# Patient Record
Sex: FEMALE | Race: ASIAN | NOT HISPANIC OR LATINO | Employment: FULL TIME | URBAN - METROPOLITAN AREA
[De-identification: names, ages, dates, MRNs, and addresses within clinical notes are randomized per-mention and may not be internally consistent; named-entity substitution may affect disease eponyms.]

---

## 2017-09-15 ENCOUNTER — OFFICE VISIT (OUTPATIENT)
Dept: MEDICAL GROUP | Facility: MEDICAL CENTER | Age: 27
End: 2017-09-15
Payer: COMMERCIAL

## 2017-09-15 VITALS
HEART RATE: 102 BPM | HEIGHT: 67 IN | SYSTOLIC BLOOD PRESSURE: 116 MMHG | RESPIRATION RATE: 16 BRPM | DIASTOLIC BLOOD PRESSURE: 70 MMHG | TEMPERATURE: 99.2 F | WEIGHT: 139.4 LBS | OXYGEN SATURATION: 97 % | BODY MASS INDEX: 21.88 KG/M2

## 2017-09-15 DIAGNOSIS — Z76.89 ENCOUNTER TO ESTABLISH CARE: ICD-10-CM

## 2017-09-15 DIAGNOSIS — E55.9 VITAMIN D DEFICIENCY: ICD-10-CM

## 2017-09-15 DIAGNOSIS — E06.3 HASHIMOTO'S THYROIDITIS: ICD-10-CM

## 2017-09-15 PROCEDURE — 99214 OFFICE O/P EST MOD 30 MIN: CPT | Performed by: PHYSICIAN ASSISTANT

## 2017-09-15 RX ORDER — LIOTHYRONINE SODIUM 5 UG/1
5 TABLET ORAL DAILY
Qty: 90 TAB | Refills: 1 | Status: SHIPPED | OUTPATIENT
Start: 2017-09-15 | End: 2018-03-29 | Stop reason: SDUPTHER

## 2017-09-15 RX ORDER — LIOTHYRONINE SODIUM 5 UG/1
5 TABLET ORAL DAILY
COMMUNITY
End: 2017-09-15 | Stop reason: SDUPTHER

## 2017-09-15 RX ORDER — LEVOTHYROXINE SODIUM 88 UG/1
CAPSULE ORAL
COMMUNITY
End: 2017-09-15 | Stop reason: SDUPTHER

## 2017-09-15 RX ORDER — LEVOTHYROXINE SODIUM 88 UG/1
1 CAPSULE ORAL EVERY MORNING
Qty: 90 CAP | Refills: 1 | Status: SHIPPED | OUTPATIENT
Start: 2017-09-15 | End: 2018-03-30 | Stop reason: SDUPTHER

## 2017-09-15 RX ORDER — LEVONORGESTREL AND ETHINYL ESTRADIOL 100-20(84)
KIT ORAL
COMMUNITY
End: 2018-01-18 | Stop reason: SDUPTHER

## 2017-09-15 ASSESSMENT — PATIENT HEALTH QUESTIONNAIRE - PHQ9: CLINICAL INTERPRETATION OF PHQ2 SCORE: 0

## 2017-09-15 NOTE — ASSESSMENT & PLAN NOTE
She also has been diagnosed with vitamin D deficiency. Has not been taking any over-the-counter supplements.

## 2017-09-15 NOTE — LETTER
[employer] [facility]  Dm Irvin P.A.-C.  [address]  [place] [address]  Fax: [phone]   Authorization for Release/Disclosure of   Protected Health Information   Name: STEPHEN LANGE : 1990 SSN: [id number]   Address:  [address]  [place] [address] Phone:    [phone] (home)    I authorize the entity listed below to release/disclose the PHI below to:   [employer] [facility]/Dm Irvin P.A.-C. and Dm Irvin P.A.-C.   Provider or Entity Name:  [facility], [place], [place]   Phone:      Fax:     Reason for request: continuity of care   Information to be released:    [  ] LAST COLONOSCOPY,  including any PATH REPORT and follow-up  [  ] LAST FIT/COLOGUARD RESULT [  ] LAST DEXA  [  ] LAST MAMMOGRAM  [  ] LAST PAP  [  ] LAST LABS [  ] RETINA EXAM REPORT  [  ] IMMUNIZATION RECORDS  [ xx ] Release all info      [  ] Check here and initial the line next to each item to release ALL health information INCLUDING  _____ Care and treatment for drug and / or alcohol abuse  _____ HIV testing, infection status, or AIDS  _____ Genetic Testing    DATES OF SERVICE OR TIME PERIOD TO BE DISCLOSED: _____________  I understand and acknowledge that:  * This Authorization may be revoked at any time by you in writing, except if your health information has already been used or disclosed.  * Your health information that will be used or disclosed as a result of you signing this authorization could be re-disclosed by the recipient. If this occurs, your re-disclosed health information may no longer be protected by State or Federal laws.  * You may refuse to sign this Authorization. Your refusal will not affect your ability to obtain treatment.  * This Authorization becomes effective upon signing and will  on (date) __________.      If no date is indicated, this Authorization will  one (1) year from the signature date.    Name: Stephen Lange    Signature:   Date:     9/15/2017       PLEASE FAX REQUESTED RECORDS BACK TO: [phone]

## 2017-09-15 NOTE — PROGRESS NOTES
"Subjective:   Osito Lange is a 27 y.o. female here today for establishing care and to discuss her chronic history of Hashimoto's thyroiditis.    Hashimoto's thyroiditis  This is a 27-year-old female was here today to establish care. She's been diagnosed with Hashimoto's thyroiditis. Her thyroid labs have been stable over the course of couple years. She is currently on Cytomel 5 mg daily. Also takes L-thyroxine 88 µg daily. [place]. Has not established with one in [place]. Has been or 5 years. Her symptoms are pretty stable.    Vitamin D deficiency  She also has been diagnosed with vitamin D deficiency. Has not been taking any over-the-counter supplements.       Current medicines (including changes today)  Current Outpatient Prescriptions   Medication Sig Dispense Refill   • Levonorgest-Eth Estrad 91-Day 0.1-0.02 & 0.01 MG Tab Take  by mouth.     • liothyronine (CYTOMEL) 5 MCG Tab Take 1 Tab by mouth every day. 90 Tab 1   • Levothyroxine Sodium (TIROSINT) 88 MCG Cap Take 1 Cap by mouth every morning. 90 Cap 1     No current facility-administered medications for this visit.      She  has a past medical history of Thyroid disease.    ROS   No chest pain, no shortness of breath, no abdominal pain and all other systems were reviewed and are negative.       Objective:     Blood pressure 116/70, pulse (!) 102, temperature 37.3 °C (99.2 °F), resp. rate 16, height 1.689 m (5' 6.5\"), weight 63.2 kg (139 lb 6.4 oz), last menstrual period 09/05/2017, SpO2 97 %. Body mass index is 22.16 kg/m².   Physical Exam:  Constitutional: Alert, no distress.  Skin: Warm, dry, good turgor, no rashes in visible areas.  Eye: Equal, round and reactive, conjunctiva clear, lids normal.  ENMT: Lips without lesions, good dentition, oropharynx clear.  Neck: Trachea midline, no masses.   Lymph: No cervical or supraclavicular lymphadenopathy  Respiratory: Unlabored respiratory effort, lungs clear to " auscultation, no wheezes, no ronchi.  Cardiovascular: Normal S1, S2, no murmur, no edema.  Abdomen: Soft, non-tender, no masses.  Psych: Alert and oriented x3, normal affect and mood.        Assessment and Plan:   The following treatment plan was discussed    1. Hashimoto's thyroiditis  Chronic condition. Currently she is tachycardic. Ordered labs to check her thyroid. Refer to endocrinology to establish care. Prescribe Cytomel and levothyroxine as she's been taking in the past. She will be contacted with the results.  - REFERRAL TO ENDOCRINOLOGY  - liothyronine (CYTOMEL) 5 MCG Tab; Take 1 Tab by mouth every day.  Dispense: 90 Tab; Refill: 1  - Levothyroxine Sodium (TIROSINT) 88 MCG Cap; Take 1 Cap by mouth every morning.  Dispense: 90 Cap; Refill: 1  - THYROID PANEL WITH TSH    2. Vitamin D deficiency  Chronic condition. Ordered vitamin D level.  - VITAMIN D,25 HYDROXY; Future    3. Encounter to establish care      Followup: Return if symptoms worsen or fail to improve.    Please note that this dictation was created using voice recognition software. I have made every reasonable attempt to correct obvious errors, but I expect that there are errors of grammar and possibly content that I did not discover before finalizing the note.

## 2017-09-15 NOTE — ASSESSMENT & PLAN NOTE
This is a 27-year-old female was here today to establish care. She's been diagnosed with Hashimoto's thyroiditis. Her thyroid labs have been stable over the course of couple years. She is currently on Cytomel 5 mg daily. Also takes L-thyroxine 88 µg daily. She was [place] by an [place]ocr[place]. Has not established with one in [place]. Has been or 5 years. Her symptoms are pretty stable.

## 2017-10-05 ENCOUNTER — TELEPHONE (OUTPATIENT)
Dept: MEDICAL GROUP | Facility: MEDICAL CENTER | Age: 27
End: 2017-10-05

## 2017-10-05 NOTE — TELEPHONE ENCOUNTER
VOICEMAIL  1. Caller Name: Taz at Endocrinology                      Call Back Number: [phone]    2. Message: Taz left a voicemail on 10/4/17 stating that before they can schedule patient's appointment he needs the prior endocrinology information and for her to do the latest TSH lab.    3. Patient approves office to leave a detailed voicemail/MyChart message: no

## 2017-11-15 ENCOUNTER — TELEPHONE (OUTPATIENT)
Dept: ENDOCRINOLOGY | Facility: MEDICAL CENTER | Age: 27
End: 2017-11-15

## 2017-11-15 NOTE — TELEPHONE ENCOUNTER
Called in to Dm Irvin's office to check in on the status of the patient's referral. I was told that I would get a call back regarding its status. The patient needs to have thyroid labs completed, and we need records from the previous endocrinologist in order for her to be scheduled.

## 2017-11-17 ENCOUNTER — TELEPHONE (OUTPATIENT)
Dept: MEDICAL GROUP | Facility: MEDICAL CENTER | Age: 27
End: 2017-11-17

## 2017-11-17 NOTE — TELEPHONE ENCOUNTER
----- Message from Dena Alex sent at 11/15/2017  1:46 PM PST -----      ----- Message -----  From: Samantha Russ, Med Ass't  Sent: 11/15/2017   1:38 PM  To: Dena Alex    Please look into this, looks like Perla opened and closed the encounter but NEVER sent it to her provider.   ----- Message -----  From: Tracy Stevens  Sent: 11/15/2017   1:21 PM  To: [facility] Anny Mcghee from endo called asking for patient thyroid labs and records from previous endocrinologist. There is an encounter on 10/05 no one has responded . Please advise. Thank you

## 2017-11-17 NOTE — TELEPHONE ENCOUNTER
Phone Number Called: [phone] (home)      Message: Left message for patient to call us back to see where she had her lab work done at.     Left Message for patient to call back: yes

## 2017-12-15 ENCOUNTER — HOSPITAL ENCOUNTER (OUTPATIENT)
Dept: LAB | Facility: MEDICAL CENTER | Age: 27
End: 2017-12-15
Attending: PHYSICIAN ASSISTANT
Payer: COMMERCIAL

## 2017-12-15 DIAGNOSIS — E55.9 VITAMIN D DEFICIENCY: ICD-10-CM

## 2017-12-15 LAB
25(OH)D3 SERPL-MCNC: 30 NG/ML (ref 30–100)
T4 FREE SERPL-MCNC: 1.01 NG/DL (ref 0.53–1.43)
TSH SERPL DL<=0.005 MIU/L-ACNC: 0.74 UIU/ML (ref 0.38–5.33)

## 2017-12-15 PROCEDURE — 84443 ASSAY THYROID STIM HORMONE: CPT

## 2017-12-15 PROCEDURE — 36415 COLL VENOUS BLD VENIPUNCTURE: CPT

## 2017-12-15 PROCEDURE — 84439 ASSAY OF FREE THYROXINE: CPT

## 2017-12-15 PROCEDURE — 82306 VITAMIN D 25 HYDROXY: CPT

## 2017-12-18 ENCOUNTER — TELEPHONE (OUTPATIENT)
Dept: MEDICAL GROUP | Facility: MEDICAL CENTER | Age: 27
End: 2017-12-18

## 2017-12-18 NOTE — TELEPHONE ENCOUNTER
Phone Number Called: [phone] (home)       Message: Patient informed of msg.    Left Message for patient to call back: yes

## 2017-12-18 NOTE — TELEPHONE ENCOUNTER
----- Message from Dm Irvin P.A.-C. sent at 12/18/2017  8:11 AM PST -----  Please contact Osito.  Thyroid labs were in normal range.  Vitamin D was at the low end of normal.  May take 1000 units of OTC vitamin D daily.  Thank you.    Dm

## 2018-01-18 RX ORDER — LEVONORGESTREL AND ETHINYL ESTRADIOL 100-20(84)
1 KIT ORAL DAILY
Qty: 91 TAB | Refills: 1 | Status: SHIPPED | OUTPATIENT
Start: 2018-01-18 | End: 2018-07-23 | Stop reason: SDUPTHER

## 2018-01-18 NOTE — TELEPHONE ENCOUNTER
Was the patient seen in the last year in this department? Yes     Does patient have an active prescription for medications requested? No     Received Request Via: Patient     Pt called and LM stating she is out of her BC pills.

## 2018-03-29 DIAGNOSIS — E06.3 HASHIMOTO'S THYROIDITIS: ICD-10-CM

## 2018-03-29 RX ORDER — LIOTHYRONINE SODIUM 5 UG/1
TABLET ORAL
Qty: 90 TAB | Refills: 1 | Status: SHIPPED | OUTPATIENT
Start: 2018-03-29 | End: 2018-09-30 | Stop reason: SDUPTHER

## 2018-03-30 DIAGNOSIS — E06.3 HASHIMOTO'S THYROIDITIS: ICD-10-CM

## 2018-03-30 RX ORDER — LEVOTHYROXINE SODIUM 88 UG/1
1 CAPSULE ORAL EVERY MORNING
Qty: 90 CAP | Refills: 1 | Status: SHIPPED | OUTPATIENT
Start: 2018-03-30 | End: 2018-11-02 | Stop reason: SDUPTHER

## 2018-11-02 ENCOUNTER — OFFICE VISIT (OUTPATIENT)
Dept: MEDICAL GROUP | Facility: MEDICAL CENTER | Age: 28
End: 2018-11-02
Payer: COMMERCIAL

## 2018-11-02 VITALS
WEIGHT: 149.25 LBS | TEMPERATURE: 98.8 F | DIASTOLIC BLOOD PRESSURE: 68 MMHG | RESPIRATION RATE: 16 BRPM | SYSTOLIC BLOOD PRESSURE: 110 MMHG | OXYGEN SATURATION: 97 % | HEIGHT: 67 IN | HEART RATE: 94 BPM | BODY MASS INDEX: 23.43 KG/M2

## 2018-11-02 DIAGNOSIS — Z30.8 ENCOUNTER FOR OTHER CONTRACEPTIVE MANAGEMENT: ICD-10-CM

## 2018-11-02 DIAGNOSIS — E06.3 HASHIMOTO'S THYROIDITIS: ICD-10-CM

## 2018-11-02 DIAGNOSIS — Z00.00 ANNUAL PHYSICAL EXAM: ICD-10-CM

## 2018-11-02 PROCEDURE — 99385 PREV VISIT NEW AGE 18-39: CPT | Performed by: PHYSICIAN ASSISTANT

## 2018-11-02 RX ORDER — LIOTHYRONINE SODIUM 5 UG/1
5 TABLET ORAL
Qty: 90 TAB | Refills: 3 | Status: SHIPPED | OUTPATIENT
Start: 2018-11-02 | End: 2019-11-25 | Stop reason: SDUPTHER

## 2018-11-02 RX ORDER — LEVONORGESTREL AND ETHINYL ESTRADIOL 100-20(84)
1 KIT ORAL
Qty: 91 TAB | Refills: 3 | Status: SHIPPED | OUTPATIENT
Start: 2018-11-02 | End: 2019-11-07 | Stop reason: SDUPTHER

## 2018-11-02 RX ORDER — LEVOTHYROXINE SODIUM 88 UG/1
1 CAPSULE ORAL EVERY MORNING
Qty: 90 CAP | Refills: 3 | Status: SHIPPED | OUTPATIENT
Start: 2018-11-02 | End: 2019-03-26

## 2018-11-02 ASSESSMENT — PATIENT HEALTH QUESTIONNAIRE - PHQ9: CLINICAL INTERPRETATION OF PHQ2 SCORE: 0

## 2018-11-02 NOTE — ASSESSMENT & PLAN NOTE
This is a 28-year-old female who is requesting annual physical today.  No new complaints today.  Taking medication for her thyroid condition.  Has not yet seen endocrinology.  Also taking birth control medication.  Would like to have labs done today.  She now has a fiancé.

## 2018-11-02 NOTE — PROGRESS NOTES
"Subjective:   Osito Lange is a 28 y.o. female here today for annual physical.    Annual physical exam  This is a 28-year-old female who is requesting annual physical today.  No new complaints today.  Taking medication for her thyroid condition.  Has not yet seen endocrinology.  Also taking birth control medication.  Would like to have labs done today.  She now has a fiancé.      Current medicines (including changes today)  Current Outpatient Prescriptions   Medication Sig Dispense Refill   • Levonorgest-Eth Estrad 91-Day 0.1-0.02 & 0.01 MG Tab Take 1 Tab by mouth every day. 91 Tab 3   • liothyronine (CYTOMEL) 5 MCG Tab Take 1 Tab by mouth every day. 90 Tab 3   • Levothyroxine Sodium (TIROSINT) 88 MCG Cap Take 1 Cap by mouth every morning. 90 Cap 3     No current facility-administered medications for this visit.      She  has a past medical history of Thyroid disease.    Social History and Family History were reviewed and updated.    ROS   No chest pain, no shortness of breath, no abdominal pain and all other systems were reviewed and are negative.       Objective:     Blood pressure 110/68, pulse 94, temperature 37.1 °C (98.8 °F), temperature source Temporal, resp. rate 16, height 1.689 m (5' 6.5\"), weight 67.7 kg (149 lb 4 oz), last menstrual period 08/02/2018, SpO2 97 %, not currently breastfeeding. Body mass index is 23.73 kg/m².   Physical Exam:  Constitutional: Alert, no distress.  Skin: Warm, dry, good turgor, no rashes in visible areas.  Eye: Equal, round and reactive, conjunctiva clear, lids normal.  ENMT: Lips without lesions, good dentition, oropharynx clear.  Neck: Trachea midline, no masses.   Lymph: No cervical or supraclavicular lymphadenopathy  Respiratory: Unlabored respiratory effort, lungs clear to auscultation, no wheezes, no ronchi.  Cardiovascular: Normal S1, S2, no murmur, no edema.  Abdomen: Soft, non-tender, no masses.  Psych: Alert and oriented x3, normal affect and " mood.        Assessment and Plan:   The following treatment plan was discussed    1. Annual physical exam  Ordered labs fasting and she will be contacted.  Provided refills of medication.  Referral made.  - LIPID PROFILE; Future  - COMP METABOLIC PANEL; Future  - HEMOGLOBIN A1C; Future      Followup: Return in about 1 year (around 11/2/2019), or if symptoms worsen or fail to improve.    Please note that this dictation was created using voice recognition software. I have made every reasonable attempt to correct obvious errors, but I expect that there are errors of grammar and possibly content that I did not discover before finalizing the note.

## 2018-12-19 ENCOUNTER — HOSPITAL ENCOUNTER (OUTPATIENT)
Dept: LAB | Facility: MEDICAL CENTER | Age: 28
End: 2018-12-19
Attending: PHYSICIAN ASSISTANT
Payer: COMMERCIAL

## 2018-12-19 DIAGNOSIS — Z00.00 ANNUAL PHYSICAL EXAM: ICD-10-CM

## 2018-12-19 LAB
ALBUMIN SERPL BCP-MCNC: 4.2 G/DL (ref 3.2–4.9)
ALBUMIN/GLOB SERPL: 1.5 G/DL
ALP SERPL-CCNC: 38 U/L (ref 30–99)
ALT SERPL-CCNC: 16 U/L (ref 2–50)
ANION GAP SERPL CALC-SCNC: 9 MMOL/L (ref 0–11.9)
AST SERPL-CCNC: 19 U/L (ref 12–45)
BILIRUB SERPL-MCNC: 0.5 MG/DL (ref 0.1–1.5)
BUN SERPL-MCNC: 17 MG/DL (ref 8–22)
CALCIUM SERPL-MCNC: 9.2 MG/DL (ref 8.5–10.5)
CHLORIDE SERPL-SCNC: 105 MMOL/L (ref 96–112)
CHOLEST SERPL-MCNC: 151 MG/DL (ref 100–199)
CO2 SERPL-SCNC: 26 MMOL/L (ref 20–33)
CREAT SERPL-MCNC: 0.97 MG/DL (ref 0.5–1.4)
FASTING STATUS PATIENT QL REPORTED: NORMAL
GLOBULIN SER CALC-MCNC: 2.8 G/DL (ref 1.9–3.5)
GLUCOSE SERPL-MCNC: 77 MG/DL (ref 65–99)
HDLC SERPL-MCNC: 60 MG/DL
LDLC SERPL CALC-MCNC: 75 MG/DL
POTASSIUM SERPL-SCNC: 3.6 MMOL/L (ref 3.6–5.5)
PROT SERPL-MCNC: 7 G/DL (ref 6–8.2)
SODIUM SERPL-SCNC: 140 MMOL/L (ref 135–145)
TRIGL SERPL-MCNC: 80 MG/DL (ref 0–149)

## 2018-12-19 PROCEDURE — 36415 COLL VENOUS BLD VENIPUNCTURE: CPT

## 2018-12-19 PROCEDURE — 80053 COMPREHEN METABOLIC PANEL: CPT

## 2018-12-19 PROCEDURE — 83036 HEMOGLOBIN GLYCOSYLATED A1C: CPT

## 2018-12-19 PROCEDURE — 80061 LIPID PANEL: CPT

## 2018-12-21 LAB — TEST NAME 95000: NORMAL

## 2019-01-30 ENCOUNTER — OFFICE VISIT (OUTPATIENT)
Dept: ENDOCRINOLOGY | Facility: MEDICAL CENTER | Age: 29
End: 2019-01-30
Payer: COMMERCIAL

## 2019-01-30 ENCOUNTER — HOSPITAL ENCOUNTER (OUTPATIENT)
Dept: LAB | Facility: MEDICAL CENTER | Age: 29
End: 2019-01-30
Attending: PHYSICIAN ASSISTANT
Payer: COMMERCIAL

## 2019-01-30 VITALS
HEART RATE: 104 BPM | WEIGHT: 151 LBS | BODY MASS INDEX: 23.7 KG/M2 | OXYGEN SATURATION: 97 % | HEIGHT: 67 IN | RESPIRATION RATE: 16 BRPM | DIASTOLIC BLOOD PRESSURE: 68 MMHG | SYSTOLIC BLOOD PRESSURE: 110 MMHG

## 2019-01-30 DIAGNOSIS — E03.8 HYPOTHYROIDISM DUE TO HASHIMOTO'S THYROIDITIS: ICD-10-CM

## 2019-01-30 DIAGNOSIS — E55.9 VITAMIN D DEFICIENCY: ICD-10-CM

## 2019-01-30 DIAGNOSIS — E06.3 HASHIMOTO'S THYROIDITIS: ICD-10-CM

## 2019-01-30 DIAGNOSIS — E06.3 HYPOTHYROIDISM DUE TO HASHIMOTO'S THYROIDITIS: ICD-10-CM

## 2019-01-30 LAB
T3FREE SERPL-MCNC: 4.18 PG/ML (ref 2.4–4.2)
T4 FREE SERPL-MCNC: 1.07 NG/DL (ref 0.53–1.43)
TSH SERPL DL<=0.005 MIU/L-ACNC: 0.53 UIU/ML (ref 0.38–5.33)

## 2019-01-30 PROCEDURE — 99203 OFFICE O/P NEW LOW 30 MIN: CPT | Performed by: PHYSICIAN ASSISTANT

## 2019-01-30 PROCEDURE — 84481 FREE ASSAY (FT-3): CPT

## 2019-01-30 PROCEDURE — 84443 ASSAY THYROID STIM HORMONE: CPT

## 2019-01-30 PROCEDURE — 84439 ASSAY OF FREE THYROXINE: CPT

## 2019-01-30 PROCEDURE — 36415 COLL VENOUS BLD VENIPUNCTURE: CPT

## 2019-01-30 NOTE — PROGRESS NOTES
"Endocrinology Clinic Progress Note  PCP: Dm Irvin P.A.-C.    HPI:  Osito Lange is a 28 y.o. old patient who comes in today for review of endocrine problems.    1. Hypothyroidism due to Hashimoto's thyroiditis  Current regimen   Cytomel 5 mcg daily   Tirosint 88 mcg daily     Patient is feeling well. She is without complaints of fatigue, joint aches. She intermittently notes GI issues and acne.     Patient recently became engaged.  She is looking to get  and potentially start a family in 1-1-1/2 years.    2. Hashimoto's thyroiditis  Intermittent swelling of the throat \"not very often\"      3. Vitamin D deficiency  Not currently taking any Vitamin D supplementation       ROS:  Constitutional: No weight loss or gain,  fever  HEENT: No difficulty with swallowing, change in voice,   Cardiac: No chest pain, palpitations, or racing heart  Resp: No shortness of breath  GI: No abdominal pain, nausea, vomiting, or diarrhea   Neuro: No numbness or tinging in feet  Endo: No heat or cold intolerance, no polyuria or polydipsia    Family history:  Mother with history of pituitary tumor thyroid enlargement and eventually thyroidectomy  Maternal aunt with goiter  Sister with thyroidectomy  According to the patient they do not have any thyroid cancer.  She has family members with type 1 diabetes.      Past Medical History:  Patient Active Problem List    Diagnosis Date Noted   • Encounter for other contraceptive management 11/02/2018   • Annual physical exam 11/02/2018   • Hashimoto's thyroiditis 09/15/2017   • Vitamin D deficiency 09/15/2017       Medications:    Current Outpatient Prescriptions:   •  Levonorgest-Eth Estrad 91-Day 0.1-0.02 & 0.01 MG Tab, Take 1 Tab by mouth every day., Disp: 91 Tab, Rfl: 3  •  liothyronine (CYTOMEL) 5 MCG Tab, Take 1 Tab by mouth every day., Disp: 90 Tab, Rfl: 3  •  Levothyroxine Sodium (TIROSINT) 88 MCG Cap, Take 1 Cap by mouth every morning., Disp: 90 Cap, Rfl: 3    Labs: " "Reviewed as above     Physical Examination:  Vital signs: /68 (BP Location: Right arm, Patient Position: Sitting, BP Cuff Size: Adult)   Pulse (!) 104   Resp 16   Ht 1.689 m (5' 6.5\")   Wt 68.5 kg (151 lb)   SpO2 97%   BMI 24.01 kg/m²  Body mass index is 24.01 kg/m².  General: No apparent distress, cooperative  Eyes: No scleral icterus, no discharge, normal eyelids  Neck: No abnormal masses on inspection, normal thyroid exam  Resp: Normal effort, clear to auscultation bilaterally  CVS: Regular rate and rhythm, S1 S2 normal, no murmur  Extremities: No lower extremity edema  Abdomen: abdominal obesity present  Musculoskeletal: Normal digits and nails  Skin: No rash on visible skin  Psych: Alert and oriented, normal mood and affect, intact memory and able to make informed decisions.    Assessment and Plan:    1. Hypothyroidism due to Hashimoto's thyroiditis  Patient will continue her Cytomel 5 mcg daily anteriors and 88 mcg daily.  She takes it on an empty stomach every day.  Reports compliance.  She does not 8 until 1-1/2-3 hours later.    She has not had any recent labs but has been increasing her exercise and has had very minimal weight loss will recheck levels.  Her labs back in 2017 were within normal limits.    Patient will my chart me when she has labs done.  We will make any dose adjustments based on labs and symptomatology.  There is concern in the future she may also want to discontinue the use of 2% and switch over to something like Levoxyl and/or Synthroid.  She is gluten-free and is slightly concerned that she may have reaction to gluten.  I did discuss that Synthroid is currently undergoing evaluation for gluten certification.    I have discussed with her pregnancy and the use of T3.  This will need to be discontinued upon trying to get pregnant.  We also discussed that we will need to increase her dose potentially prior to her trying to get pregnant.     2. Hashimoto's thyroiditis    - TSH; " Standing  - FREE THYROXINE; Standing  - T3 FREE; Standing    3. Vitamin D deficiency  Patient is currently not on supplementation.  Her levels were at 30.  We discussed potential addition of 1-2000 IUs daily.    I have not made any follow-up for the patient at this time.  She understands that when switching dose we will need to recheck labs and this will require a visit to discuss change and or adjustment of her medication dosage.  We also discussed should she become pregnant she should contact the office to make an appointment for evaluation.  Otherwise I will plan to see her will once yearly unless symptoms intervene.    Return if symptoms worsen or fail to improve.    Thank you for allowing me to participate in the care of this patient.  If you have any questions or concerns please do not hesitate to contact me.    Niesha Wise P.A.-C.    CC:   Dm Irvin P.A.-C.    This note was created using voice recognition software (Dragon). The accuracy of the dictation is limited by the abilities of the software. I have reviewed the note prior to signing, however some errors in grammar and context are still possible. If you have any questions related to this note please do not hesitate to contact our office.

## 2019-03-26 RX ORDER — LEVOTHYROXINE SODIUM 88 UG/1
TABLET ORAL
Qty: 30 TAB | Refills: 3 | Status: SHIPPED | OUTPATIENT
Start: 2019-03-26 | End: 2019-07-28 | Stop reason: SDUPTHER

## 2019-04-22 ENCOUNTER — OFFICE VISIT (OUTPATIENT)
Dept: URGENT CARE | Facility: CLINIC | Age: 29
End: 2019-04-22
Payer: COMMERCIAL

## 2019-04-22 VITALS
DIASTOLIC BLOOD PRESSURE: 64 MMHG | HEART RATE: 82 BPM | WEIGHT: 150 LBS | RESPIRATION RATE: 16 BRPM | BODY MASS INDEX: 24.11 KG/M2 | SYSTOLIC BLOOD PRESSURE: 106 MMHG | TEMPERATURE: 98.8 F | OXYGEN SATURATION: 97 % | HEIGHT: 66 IN

## 2019-04-22 DIAGNOSIS — K25.3 ACUTE GASTRIC ULCER, UNSPECIFIED WHETHER GASTRIC ULCER HEMORRHAGE OR PERFORATION PRESENT: ICD-10-CM

## 2019-04-22 PROCEDURE — 99214 OFFICE O/P EST MOD 30 MIN: CPT | Performed by: PHYSICIAN ASSISTANT

## 2019-04-22 RX ORDER — ONDANSETRON 4 MG/1
4 TABLET, FILM COATED ORAL
Qty: 15 TAB | Refills: 0 | Status: SHIPPED | OUTPATIENT
Start: 2019-04-22 | End: 2019-05-07

## 2019-04-22 RX ORDER — OMEPRAZOLE 20 MG/1
20 CAPSULE, DELAYED RELEASE ORAL DAILY
Qty: 30 CAP | Refills: 1 | Status: SHIPPED | OUTPATIENT
Start: 2019-04-22 | End: 2019-11-26

## 2019-04-22 ASSESSMENT — ENCOUNTER SYMPTOMS
HEARTBURN: 0
BLOOD IN STOOL: 0
ABDOMINAL PAIN: 1
CONSTIPATION: 0
DIARRHEA: 0
VOMITING: 1
NAUSEA: 1

## 2019-04-22 NOTE — PROGRESS NOTES
Subjective:   Osito Lange is a 29 y.o. female who presents today with   Chief Complaint   Patient presents with   • Pain     nausea, vomitting and  feel like pressure in stomach, acidic feeling, feels like ulcer started 3 weeks ago       Nausea   This is a new problem. The current episode started 1 to 4 weeks ago. The problem occurs intermittently. The problem has been unchanged. Associated symptoms include abdominal pain, nausea and vomiting. The symptoms are aggravated by eating.   Patient has had a history of ulcers and has been treated with a PPI before with relief.  She said the last ulcer she had was when she was 21.  She has noticed certain foods and drinks that trigger her symptoms including seltzer water, caffeine and acidic foods.  The pain is cramping in her stomach after certain foods are eaten.  She states that she has not had any severe abdominal pain or changes in bowel movement.  She states that she took a pregnancy test at home and has no concerns and that it was negative.    PMH:  has a past medical history of Thyroid disease.  MEDS:   Current Outpatient Prescriptions:   •  omeprazole (PRILOSEC) 20 MG delayed-release capsule, Take 1 Cap by mouth every day., Disp: 30 Cap, Rfl: 1  •  ondansetron (ZOFRAN) 4 MG Tab tablet, Take 1 Tab by mouth 1 time daily as needed for Nausea/Vomiting for up to 15 days., Disp: 15 Tab, Rfl: 0  •  levothyroxine (SYNTHROID) 88 MCG Tab, DAW1 take 1 tablet on ES daily, Disp: 30 Tab, Rfl: 3  •  Levonorgest-Eth Estrad 91-Day 0.1-0.02 & 0.01 MG Tab, Take 1 Tab by mouth every day., Disp: 91 Tab, Rfl: 3  •  liothyronine (CYTOMEL) 5 MCG Tab, Take 1 Tab by mouth every day., Disp: 90 Tab, Rfl: 3  ALLERGIES:   Allergies   Allergen Reactions   • Penicillins Hives     SURGHX: No past surgical history on file.  SOCHX:  reports that she has never smoked. She has never used smokeless tobacco. She reports that she drinks alcohol. She reports that she does not use drugs.  FH:  "Reviewed with patient, not pertinent to this visit.       Review of Systems   Gastrointestinal: Positive for abdominal pain, nausea and vomiting. Negative for blood in stool, constipation, diarrhea, heartburn and melena.   All other systems reviewed and are negative.       Objective:   /64 (BP Location: Left arm, Patient Position: Sitting, BP Cuff Size: Adult)   Pulse 82   Temp 37.1 °C (98.8 °F) (Temporal)   Resp 16   Ht 1.676 m (5' 6\")   Wt 68 kg (150 lb)   SpO2 97%   BMI 24.21 kg/m²   Physical Exam   Constitutional: She appears well-developed and well-nourished. No distress.   HENT:   Head: Normocephalic and atraumatic.   Right Ear: Hearing normal.   Left Ear: Hearing normal.   Eyes: Pupils are equal, round, and reactive to light.   Cardiovascular: Normal rate, regular rhythm and normal heart sounds.    Pulmonary/Chest: Effort normal and breath sounds normal.   Abdominal: Soft. Bowel sounds are normal. She exhibits no distension and no mass. There is no tenderness. There is no guarding.   Musculoskeletal:   Normal movement in all 4 extremities   Neurological: She is alert. Coordination normal.   Skin: Skin is warm and dry.   Psychiatric: She has a normal mood and affect.   Nursing note and vitals reviewed.        Assessment/Plan:   Assessment    1. Acute gastric ulcer, unspecified whether gastric ulcer hemorrhage or perforation present  - REFERRAL TO GASTROENTEROLOGY  - omeprazole (PRILOSEC) 20 MG delayed-release capsule; Take 1 Cap by mouth every day.  Dispense: 30 Cap; Refill: 1  - ondansetron (ZOFRAN) 4 MG Tab tablet; Take 1 Tab by mouth 1 time daily as needed for Nausea/Vomiting for up to 15 days.  Dispense: 15 Tab; Refill: 0  Patient educated on not ingesting acidic foods or drinks at this time.  Patient encouraged to eat a bland diet.  Patient encouraged to follow-up with primary care doctor.  Differential diagnosis, natural history, supportive care, and indications for immediate follow-up " discussed.   Patient given instructions and understanding of medications and treatment.    If not improving in 3-5 days, F/U with PCP or return to UC if symptoms worsen.  Strict ER precautions given if abdominal pain ever worsens or persists.   Patient agreeable to plan.      Octavio Grady PA-C

## 2019-06-14 ENCOUNTER — HOSPITAL ENCOUNTER (OUTPATIENT)
Dept: LAB | Facility: MEDICAL CENTER | Age: 29
End: 2019-06-14
Attending: PHYSICIAN ASSISTANT
Payer: COMMERCIAL

## 2019-06-14 DIAGNOSIS — E06.3 HASHIMOTO'S THYROIDITIS: ICD-10-CM

## 2019-06-14 PROCEDURE — 84481 FREE ASSAY (FT-3): CPT

## 2019-06-14 PROCEDURE — 36415 COLL VENOUS BLD VENIPUNCTURE: CPT

## 2019-06-14 PROCEDURE — 84443 ASSAY THYROID STIM HORMONE: CPT

## 2019-06-14 PROCEDURE — 84439 ASSAY OF FREE THYROXINE: CPT

## 2019-06-15 LAB
T3FREE SERPL-MCNC: 3.15 PG/ML (ref 2.4–4.2)
T4 FREE SERPL-MCNC: 1.04 NG/DL (ref 0.53–1.43)
TSH SERPL DL<=0.005 MIU/L-ACNC: 1.02 UIU/ML (ref 0.38–5.33)

## 2019-06-29 DIAGNOSIS — E03.9 HYPOTHYROIDISM, UNSPECIFIED TYPE: ICD-10-CM

## 2019-07-28 RX ORDER — LEVOTHYROXINE SODIUM 88 UG/1
TABLET ORAL
Qty: 30 TAB | Refills: 3 | Status: SHIPPED | OUTPATIENT
Start: 2019-07-28 | End: 2019-07-30 | Stop reason: SDUPTHER

## 2019-07-30 RX ORDER — LEVOTHYROXINE SODIUM 88 UG/1
TABLET ORAL
Qty: 30 TAB | Refills: 3 | Status: SHIPPED | OUTPATIENT
Start: 2019-07-30 | End: 2019-08-30 | Stop reason: SDUPTHER

## 2019-08-14 ENCOUNTER — HOSPITAL ENCOUNTER (OUTPATIENT)
Dept: LAB | Facility: MEDICAL CENTER | Age: 29
End: 2019-08-14
Attending: PHYSICIAN ASSISTANT
Payer: COMMERCIAL

## 2019-08-14 ENCOUNTER — OFFICE VISIT (OUTPATIENT)
Dept: ENDOCRINOLOGY | Facility: MEDICAL CENTER | Age: 29
End: 2019-08-14
Payer: COMMERCIAL

## 2019-08-14 VITALS
WEIGHT: 158 LBS | OXYGEN SATURATION: 98 % | HEIGHT: 66 IN | DIASTOLIC BLOOD PRESSURE: 70 MMHG | SYSTOLIC BLOOD PRESSURE: 120 MMHG | HEART RATE: 70 BPM | BODY MASS INDEX: 25.39 KG/M2

## 2019-08-14 DIAGNOSIS — E55.9 VITAMIN D DEFICIENCY: ICD-10-CM

## 2019-08-14 DIAGNOSIS — E03.8 HYPOTHYROIDISM DUE TO HASHIMOTO'S THYROIDITIS: ICD-10-CM

## 2019-08-14 DIAGNOSIS — E03.9 HYPOTHYROIDISM, UNSPECIFIED TYPE: ICD-10-CM

## 2019-08-14 DIAGNOSIS — E06.3 HASHIMOTO'S THYROIDITIS: ICD-10-CM

## 2019-08-14 DIAGNOSIS — E06.3 HYPOTHYROIDISM DUE TO HASHIMOTO'S THYROIDITIS: ICD-10-CM

## 2019-08-14 PROCEDURE — 84443 ASSAY THYROID STIM HORMONE: CPT

## 2019-08-14 PROCEDURE — 84439 ASSAY OF FREE THYROXINE: CPT

## 2019-08-14 PROCEDURE — 84480 ASSAY TRIIODOTHYRONINE (T3): CPT

## 2019-08-14 PROCEDURE — 99213 OFFICE O/P EST LOW 20 MIN: CPT | Performed by: PHYSICIAN ASSISTANT

## 2019-08-14 PROCEDURE — 36415 COLL VENOUS BLD VENIPUNCTURE: CPT

## 2019-08-14 PROCEDURE — 84481 FREE ASSAY (FT-3): CPT

## 2019-08-14 NOTE — PROGRESS NOTES
Endocrinology Clinic Progress Note  PCP: Dm Irvin P.A.-C.    HPI:  Osito Lange is a 28 y.o. old patient who comes in today for review of endocrine problems.    1. Hypothyroidism due to Hashimoto's thyroiditis  Current regimen   Cytomel 5 mcg daily   Synthroid 88 mcg daily with 1 1/2 daily -     Patient is feeling tired. Patient having difficulty with motivation she has also noticed dry skin and peeling of her toes with regards to her skin.  She does notice this usually when she is low.    2. Hashimoto's thyroiditis  Patient denies any swelling of her throat and/or difficulty swallowing.      3. Vitamin D deficiency  She has been taking her vitamin D at 2000 IUs.      ROS:  Constitutional: No weight loss she feels as if she has had weight gain.,  fever  HEENT: No difficulty with swallowing, change in voice,   Cardiac: No chest pain, palpitations, or racing heart  Resp: No shortness of breath  GI: No abdominal pain, nausea, vomiting, or diarrhea   Neuro: No numbness or tinging in feet  Endo: No heat or cold intolerance, no polyuria or polydipsia    Family history:  Mother with history of pituitary tumor thyroid enlargement and eventually thyroidectomy  Maternal aunt with goiter  Sister with thyroidectomy  According to the patient they do not have any thyroid cancer.  She has family members with type 1 diabetes.      Past Medical History:  Patient Active Problem List    Diagnosis Date Noted   • Encounter for other contraceptive management 11/02/2018   • Annual physical exam 11/02/2018   • Hashimoto's thyroiditis 09/15/2017   • Vitamin D deficiency 09/15/2017       Medications:    Current Outpatient Medications:   •  levothyroxine (SYNTHROID) 88 MCG Tab, DAW1 take 1 tablet on ES daily (Patient taking differently: 88 mcg. DAW1 take 1 tablet on ES daily), Disp: 30 Tab, Rfl: 3  •  omeprazole (PRILOSEC) 20 MG delayed-release capsule, Take 1 Cap by mouth every day., Disp: 30 Cap, Rfl: 1  •  Levonorgest-Eth  "Estrad 91-Day 0.1-0.02 & 0.01 MG Tab, Take 1 Tab by mouth every day., Disp: 91 Tab, Rfl: 3  •  liothyronine (CYTOMEL) 5 MCG Tab, Take 1 Tab by mouth every day., Disp: 90 Tab, Rfl: 3    Labs: Reviewed as above     Physical Examination:  Vital signs: /70 (BP Location: Left arm, Patient Position: Sitting, BP Cuff Size: Adult)   Pulse 70   Ht 1.676 m (5' 6\")   Wt 71.7 kg (158 lb)   LMP 08/12/2019 (Exact Date)   SpO2 98%   BMI 25.50 kg/m²  Body mass index is 25.5 kg/m².  General: No apparent distress, cooperative  Eyes: No scleral icterus, no discharge, normal eyelids  Neck: No abnormal masses on inspection, normal thyroid exam  Resp: Normal effort, clear to auscultation bilaterally  CVS: Regular rate and rhythm, S1 S2 normal, no murmur  Extremities: No lower extremity edema  Musculoskeletal: Normal digits and nails  Skin: No rash on visible skin  Psych: Alert and oriented, normal mood and affect, intact memory and able to make informed decisions.    Assessment and Plan:    1. Hypothyroidism due to Hashimoto's thyroiditis  Cytomel 5 mcg daily   Synthroid 88 mcg daily x6 days and 1-1/2 tablets day #7   Will have labs done and then will increase to 100 mcg     We will check labs today and make dose adjustments accordingly    2. Hashimoto's thyroiditis  Continue to monitor    3. Vitamin D deficiency  2000 continue chronic vitamin replacement    Return in about 2 months (around 10/14/2019).    Thank you for allowing me to participate in the care of this patient.  If you have any questions or concerns please do not hesitate to contact me.    KORIN Worley.-FRANCK.    CC:   Dm Irvin P.A.-C.    This note was created using voice recognition software (Dragon). The accuracy of the dictation is limited by the abilities of the software. I have reviewed the note prior to signing, however some errors in grammar and context are still possible. If you have any questions related to this note please do not " hesitate to contact our office.

## 2019-08-15 LAB
T3 SERPL-MCNC: 86.6 NG/DL (ref 60–181)
T3FREE SERPL-MCNC: 3.32 PG/ML (ref 2.4–4.2)
T4 FREE SERPL-MCNC: 1.07 NG/DL (ref 0.53–1.43)
TSH SERPL DL<=0.005 MIU/L-ACNC: 0.75 UIU/ML (ref 0.38–5.33)

## 2019-08-30 NOTE — TELEPHONE ENCOUNTER
**Last office visit 8/14/19**    Was the patient seen in the last year in this department? Yes    Does patient have an active prescription for medications requested? Yes    Received Request Via: Patient    Day supply: 90 day    (Fax rx to [facility] [phone]   through [employer])

## 2019-09-03 RX ORDER — LEVOTHYROXINE SODIUM 88 UG/1
88 TABLET ORAL
Qty: 90 TAB | Refills: 1 | Status: SHIPPED | OUTPATIENT
Start: 2019-09-03 | End: 2019-09-06

## 2019-09-06 DIAGNOSIS — E06.3 HYPOTHYROIDISM, ACQUIRED, AUTOIMMUNE: ICD-10-CM

## 2019-09-06 RX ORDER — LEVOTHYROXINE SODIUM 100 MCG
TABLET ORAL
Qty: 32 TAB | Refills: 3 | Status: SHIPPED | OUTPATIENT
Start: 2019-09-06 | End: 2019-10-13 | Stop reason: SDUPTHER

## 2019-09-27 ENCOUNTER — HOSPITAL ENCOUNTER (OUTPATIENT)
Dept: LAB | Facility: MEDICAL CENTER | Age: 29
End: 2019-09-27
Attending: PHYSICIAN ASSISTANT
Payer: COMMERCIAL

## 2019-09-27 DIAGNOSIS — E03.8 HYPOTHYROIDISM DUE TO HASHIMOTO'S THYROIDITIS: ICD-10-CM

## 2019-09-27 DIAGNOSIS — E06.3 HYPOTHYROIDISM DUE TO HASHIMOTO'S THYROIDITIS: ICD-10-CM

## 2019-09-27 LAB
T3 SERPL-MCNC: 111.4 NG/DL (ref 60–181)
T3FREE SERPL-MCNC: 3.39 PG/ML (ref 2.4–4.2)
T4 FREE SERPL-MCNC: 1.11 NG/DL (ref 0.53–1.43)
TSH SERPL DL<=0.005 MIU/L-ACNC: 0.3 UIU/ML (ref 0.38–5.33)

## 2019-09-27 PROCEDURE — 36415 COLL VENOUS BLD VENIPUNCTURE: CPT

## 2019-09-27 PROCEDURE — 84480 ASSAY TRIIODOTHYRONINE (T3): CPT

## 2019-09-27 PROCEDURE — 84443 ASSAY THYROID STIM HORMONE: CPT

## 2019-09-27 PROCEDURE — 84481 FREE ASSAY (FT-3): CPT

## 2019-09-27 PROCEDURE — 84439 ASSAY OF FREE THYROXINE: CPT

## 2019-10-09 ENCOUNTER — OFFICE VISIT (OUTPATIENT)
Dept: ENDOCRINOLOGY | Facility: MEDICAL CENTER | Age: 29
End: 2019-10-09
Payer: COMMERCIAL

## 2019-10-09 VITALS
HEIGHT: 66 IN | WEIGHT: 159 LBS | SYSTOLIC BLOOD PRESSURE: 106 MMHG | BODY MASS INDEX: 25.55 KG/M2 | DIASTOLIC BLOOD PRESSURE: 70 MMHG | OXYGEN SATURATION: 97 % | HEART RATE: 97 BPM

## 2019-10-09 DIAGNOSIS — E55.9 VITAMIN D DEFICIENCY: ICD-10-CM

## 2019-10-09 DIAGNOSIS — E06.3 HASHIMOTO'S THYROIDITIS: ICD-10-CM

## 2019-10-09 DIAGNOSIS — E06.3 HYPOTHYROIDISM, ACQUIRED, AUTOIMMUNE: ICD-10-CM

## 2019-10-09 PROCEDURE — 99213 OFFICE O/P EST LOW 20 MIN: CPT | Performed by: PHYSICIAN ASSISTANT

## 2019-10-09 NOTE — PROGRESS NOTES
Endocrinology Clinic Progress Note  PCP: Dm Irvin P.A.-C.    HPI:  Osito Lange is a 28 y.o. old patient who comes in today for review of endocrine problems.    1. Hypothyroidism due to Hashimoto's thyroiditis  Current regimen   Cytomel 5 mcg daily   Synthroid 100 mcg daily       Ref. Range 9/27/2019 10:28   TSH Latest Ref Range: 0.380 - 5.330 uIU/mL 0.300 (L)   Free T-4 Latest Ref Range: 0.53 - 1.43 ng/dL 1.11   T3 Latest Ref Range: 60.0 - 181.0 ng/dL 111.4   T3,Free Latest Ref Range: 2.40 - 4.20 pg/mL 3.39       Patient is complaining of increased in anxiety.     2. Hashimoto's thyroiditis  Patient denies any swelling of her throat and/or difficulty swallowing.      3. Vitamin D deficiency  She has been taking her vitamin D at 2000 IUs.      ROS:  Constitutional: No weight loss she feels as if she has had weight gain.,  fever  HEENT: No difficulty with swallowing, change in voice,   Cardiac: No chest pain, palpitations, or racing heart  Resp: No shortness of breath  GI: No abdominal pain, nausea, vomiting, or diarrhea   Neuro: No numbness or tinging in feet  Endo: No heat or cold intolerance, no polyuria or polydipsia    Family history:  Mother with history of pituitary tumor thyroid enlargement and eventually thyroidectomy  Maternal aunt with goiter  Sister with thyroidectomy  According to the patient they do not have any thyroid cancer.  She has family members with type 1 diabetes.      Past Medical History:  Patient Active Problem List    Diagnosis Date Noted   • Hypothyroidism, acquired, autoimmune 09/06/2019   • Encounter for other contraceptive management 11/02/2018   • Annual physical exam 11/02/2018   • Hashimoto's thyroiditis 09/15/2017   • Vitamin D deficiency 09/15/2017       Medications:    Current Outpatient Medications:   •  SYNTHROID 100 MCG Tab, Take 1 tablet each a.m. on  empty stomach 1/2-hour 6 days/week and 1.5 tablets one day per week, Disp: 32 Tab, Rfl: 3  •  Levonorgest-Eth  "Estrad 91-Day 0.1-0.02 & 0.01 MG Tab, Take 1 Tab by mouth every day., Disp: 91 Tab, Rfl: 3  •  liothyronine (CYTOMEL) 5 MCG Tab, Take 1 Tab by mouth every day., Disp: 90 Tab, Rfl: 3  •  omeprazole (PRILOSEC) 20 MG delayed-release capsule, Take 1 Cap by mouth every day. (Patient not taking: Reported on 10/9/2019), Disp: 30 Cap, Rfl: 1    Labs: Reviewed as above     Physical Examination:  Vital signs: /70 (BP Location: Right arm, Patient Position: Sitting, BP Cuff Size: Adult)   Pulse 97   Ht 1.676 m (5' 6\")   Wt 72.1 kg (159 lb)   SpO2 97%   BMI 25.66 kg/m²  Body mass index is 25.66 kg/m².  General: No apparent distress, cooperative  Eyes: No scleral icterus, no discharge, normal eyelids  Neck: No abnormal masses on inspection, normal thyroid exam  Resp: Normal effort, clear to auscultation bilaterally  CVS: Regular rate and rhythm, S1 S2 normal, no murmur  Extremities: No lower extremity edema  Musculoskeletal: Normal digits and nails  Skin: No rash on visible skin  Psych: Alert and oriented, normal mood and affect, intact memory and able to make informed decisions.    Assessment and Plan:    1. Hypothyroidism due to Hashimoto's thyroiditis  Cytomel 5 mcg daily   Synthroid 100 mcg daily     2. Vitamin D deficiency  2000 continue chronic vitamin replacement    Return in about 3 months (around 1/9/2020).    Thank you for allowing me to participate in the care of this patient.  If you have any questions or concerns please do not hesitate to contact me.    KORIN Worley.-FRANCK.    CC:   Dm Irvin P.A.-C.    This note was created using voice recognition software (Dragon). The accuracy of the dictation is limited by the abilities of the software. I have reviewed the note prior to signing, however some errors in grammar and context are still possible. If you have any questions related to this note please do not hesitate to contact our office.   "

## 2019-10-13 DIAGNOSIS — E06.3 HYPOTHYROIDISM, ACQUIRED, AUTOIMMUNE: ICD-10-CM

## 2019-10-13 RX ORDER — LEVOTHYROXINE SODIUM 100 MCG
TABLET ORAL
Qty: 30 TAB | Refills: 3 | Status: SHIPPED | OUTPATIENT
Start: 2019-10-13 | End: 2020-01-15

## 2019-10-18 ENCOUNTER — OFFICE VISIT (OUTPATIENT)
Dept: URGENT CARE | Facility: CLINIC | Age: 29
End: 2019-10-18
Payer: COMMERCIAL

## 2019-10-18 ENCOUNTER — HOSPITAL ENCOUNTER (OUTPATIENT)
Facility: MEDICAL CENTER | Age: 29
End: 2019-10-18
Attending: PHYSICIAN ASSISTANT
Payer: COMMERCIAL

## 2019-10-18 VITALS
OXYGEN SATURATION: 99 % | RESPIRATION RATE: 12 BRPM | BODY MASS INDEX: 24.91 KG/M2 | HEIGHT: 66 IN | TEMPERATURE: 98.2 F | SYSTOLIC BLOOD PRESSURE: 102 MMHG | DIASTOLIC BLOOD PRESSURE: 70 MMHG | HEART RATE: 101 BPM | WEIGHT: 155 LBS

## 2019-10-18 DIAGNOSIS — B37.31 CANDIDAL VULVOVAGINITIS: ICD-10-CM

## 2019-10-18 LAB
CANDIDA DNA VAG QL PROBE+SIG AMP: POSITIVE
G VAGINALIS DNA VAG QL PROBE+SIG AMP: NEGATIVE
T VAGINALIS DNA VAG QL PROBE+SIG AMP: NEGATIVE

## 2019-10-18 PROCEDURE — 87660 TRICHOMONAS VAGIN DIR PROBE: CPT

## 2019-10-18 PROCEDURE — 87480 CANDIDA DNA DIR PROBE: CPT

## 2019-10-18 PROCEDURE — 87510 GARDNER VAG DNA DIR PROBE: CPT

## 2019-10-18 PROCEDURE — 99214 OFFICE O/P EST MOD 30 MIN: CPT | Performed by: PHYSICIAN ASSISTANT

## 2019-10-18 RX ORDER — FLUCONAZOLE 150 MG/1
150 TABLET ORAL DAILY
Qty: 1 TAB | Refills: 0 | Status: SHIPPED | OUTPATIENT
Start: 2019-10-18 | End: 2019-11-26

## 2019-10-18 ASSESSMENT — ENCOUNTER SYMPTOMS
DIARRHEA: 0
CHILLS: 0
VOMITING: 0
NAUSEA: 0
FLANK PAIN: 0
FEVER: 0
ABDOMINAL PAIN: 0

## 2019-10-18 NOTE — PROGRESS NOTES
"Subjective:   Osito Lange is a 29 y.o. female who presents for Vaginal Swelling (x1.5 weeks); Vaginal Discharge (x2 days); and Vaginal Itching (x2 days)    This is a new problem.  Patient presents to urgent care with 1/2-week history of slight discomfort in the inner labia followed by onset of chunky white vaginal discharge the past 2 days with vaginal itching and burning.  Patient reports prior history of yeast infections and feels that this is likely a yeast infection.  She has not used any over-the-counter medication.  Patient believes that using new tampons for some spotting may have been a contributing factor.  Patient denies any new sexual partners or concerns for STIs.  Denies dysuria, urgency, frequency with urination.        Vaginal Itching   Pertinent negatives include no abdominal pain, chills, fever, nausea or vomiting.     Review of Systems   Constitutional: Negative for chills, fever and malaise/fatigue.   Gastrointestinal: Negative for abdominal pain, diarrhea, nausea and vomiting.   Genitourinary: Negative for dysuria, flank pain, frequency, hematuria and urgency.   All other systems reviewed and are negative.    Allergies   Allergen Reactions   • Penicillins Hives        Objective:   /70 (BP Location: Left arm, Patient Position: Sitting, BP Cuff Size: Adult)   Pulse (!) 101   Temp 36.8 °C (98.2 °F) (Temporal)   Resp 12   Ht 1.676 m (5' 6\")   Wt 70.3 kg (155 lb)   SpO2 99%   Breastfeeding? No Comment: spotting  BMI 25.02 kg/m²   Physical Exam   Constitutional: She is oriented to person, place, and time. She appears well-developed and well-nourished. She does not appear ill. No distress.   HENT:   Head: Normocephalic and atraumatic.   Right Ear: Tympanic membrane, external ear and ear canal normal.   Left Ear: Tympanic membrane, external ear and ear canal normal.   Nose: Nose normal.   Mouth/Throat: Uvula is midline, oropharynx is clear and moist and mucous membranes are normal. " No oropharyngeal exudate.   Eyes: Pupils are equal, round, and reactive to light. Conjunctivae and EOM are normal.   Neck: Normal range of motion. Neck supple.   Cardiovascular: Normal rate, regular rhythm and normal heart sounds. Exam reveals no gallop and no friction rub.   No murmur heard.  Pulmonary/Chest: Effort normal and breath sounds normal. No respiratory distress. She has no wheezes. She has no rales.   Abdominal: Soft. Bowel sounds are normal. She exhibits no distension and no mass. There is no tenderness. There is no rebound and no guarding.   Genitourinary:   Genitourinary Comments: Patient declines  exam   Musculoskeletal: Normal range of motion. She exhibits no edema, tenderness or deformity.   Lymphadenopathy:        Head (right side): No submental, no submandibular and no tonsillar adenopathy present.        Head (left side): No submental, no submandibular and no tonsillar adenopathy present.     She has no cervical adenopathy.        Right: No supraclavicular adenopathy present.        Left: No supraclavicular adenopathy present.   Neurological: She is alert and oriented to person, place, and time. She has normal strength. No cranial nerve deficit or sensory deficit. Coordination normal.   Skin: Skin is warm and dry. No rash noted.   Psychiatric: She has a normal mood and affect. Judgment normal.   Vitals reviewed.          Assessment/Plan:   Assessment    1. Candidal vulvovaginitis  - VAGINAL PATHOGENS DNA PANEL; Future  - fluconazole (DIFLUCAN) 150 MG tablet; Take 1 Tab by mouth every day.  Dispense: 1 Tab; Refill: 0    Patient will perform self swab for vaginal pathogens.  I did offer to perform exam which she declines at this time.  Reviewed with patient the potential inherent risk of not performing exam including the possibility of missed retained objects such as condoms, etc.  Patient desires to self swab.    Patient will be treated with fluconazole one-time dose as above.  Differential  diagnosis, natural history, supportive care, and indications for immediate follow-up discussed.    If not improving in 3-5 days, F/U with PCP or return to  or sooner if worsens  Red flag warning symptoms and strict ER/follow-up precautions given.  The patient demonstrated a good understanding and agreed with the treatment plan.  Please note that this note was created using voice recognition speech to text software. Every effort has been made to correct obvious errors.  However, I expect there are errors of grammar and possibly context that were not discovered prior to finalizing the note  SUHAS Zepeda PA-C

## 2019-10-18 NOTE — PATIENT INSTRUCTIONS
Vaginal Yeast infection, Adult  Vaginal yeast infection is a condition that causes soreness, swelling, and redness (inflammation) of the vagina. It also causes vaginal discharge. This is a common condition. Some women get this infection frequently.  What are the causes?  This condition is caused by a change in the normal balance of the yeast (candida) and bacteria that live in the vagina. This change causes an overgrowth of yeast, which causes the inflammation.  What increases the risk?  This condition is more likely to develop in:  · Women who take antibiotic medicines.  · Women who have diabetes.  · Women who take birth control pills.  · Women who are pregnant.  · Women who douche often.  · Women who have a weak defense (immune) system.  · Women who have been taking steroid medicines for a long time.  · Women who frequently wear tight clothing.  What are the signs or symptoms?  Symptoms of this condition include:  · White, thick vaginal discharge.  · Swelling, itching, redness, and irritation of the vagina. The lips of the vagina (vulva) may be affected as well.  · Pain or a burning feeling while urinating.  · Pain during sex.  How is this diagnosed?  This condition is diagnosed with a medical history and physical exam. This will include a pelvic exam. Your health care provider will examine a sample of your vaginal discharge under a microscope. Your health care provider may send this sample for testing to confirm the diagnosis.  How is this treated?  This condition is treated with medicine. Medicines may be over-the-counter or prescription. You may be told to use one or more of the following:  · Medicine that is taken orally.  · Medicine that is applied as a cream.  · Medicine that is inserted directly into the vagina (suppository).  Follow these instructions at home:  · Take or apply over-the-counter and prescription medicines only as told by your health care provider.  · Do not have sex until your health care  provider has approved. Tell your sex partner that you have a yeast infection. That person should go to his or her health care provider if he or she develops symptoms.  · Do not wear tight clothes, such as pantyhose or tight pants.  · Avoid using tampons until your health care provider approves.  · Eat more yogurt. This may help to keep your yeast infection from returning.  · Try taking a sitz bath to help with discomfort. This is a warm water bath that is taken while you are sitting down. The water should only come up to your hips and should cover your buttocks. Do this 3-4 times per day or as told by your health care provider.  · Do not douche.  · Wear breathable, cotton underwear.  · If you have diabetes, keep your blood sugar levels under control.  Contact a health care provider if:  · You have a fever.  · Your symptoms go away and then return.  · Your symptoms do not get better with treatment.  · Your symptoms get worse.  · You have new symptoms.  · You develop blisters in or around your vagina.  · You have blood coming from your vagina and it is not your menstrual period.  · You develop pain in your abdomen.  This information is not intended to replace advice given to you by your health care provider. Make sure you discuss any questions you have with your health care provider.  Document Released: 09/27/2006 Document Revised: 05/31/2017 Document Reviewed: 06/20/2016  [employer] Interactive Patient Education © 2017 [employer]

## 2019-10-19 ENCOUNTER — TELEPHONE (OUTPATIENT)
Dept: URGENT CARE | Facility: PHYSICIAN GROUP | Age: 29
End: 2019-10-19

## 2019-10-19 NOTE — TELEPHONE ENCOUNTER
Contacted patient to notify of vaginal pathogens positive for Candida.  Patient is being treated with fluconazole.  No change to plan.  Follow-up as needed.  SUHAS Zepeda PA-C

## 2019-11-25 ENCOUNTER — PATIENT MESSAGE (OUTPATIENT)
Dept: ENDOCRINOLOGY | Facility: MEDICAL CENTER | Age: 29
End: 2019-11-25

## 2019-11-25 DIAGNOSIS — E06.3 HASHIMOTO'S THYROIDITIS: ICD-10-CM

## 2019-11-25 RX ORDER — LIOTHYRONINE SODIUM 5 UG/1
5 TABLET ORAL
Qty: 90 TAB | Refills: 3 | Status: SHIPPED | OUTPATIENT
Start: 2019-11-25 | End: 2020-01-30 | Stop reason: SDUPTHER

## 2019-11-26 ENCOUNTER — OFFICE VISIT (OUTPATIENT)
Dept: MEDICAL GROUP | Facility: MEDICAL CENTER | Age: 29
End: 2019-11-26
Payer: COMMERCIAL

## 2019-11-26 VITALS
RESPIRATION RATE: 16 BRPM | DIASTOLIC BLOOD PRESSURE: 72 MMHG | HEIGHT: 66 IN | TEMPERATURE: 98.4 F | HEART RATE: 78 BPM | SYSTOLIC BLOOD PRESSURE: 122 MMHG | WEIGHT: 155 LBS | BODY MASS INDEX: 24.91 KG/M2 | OXYGEN SATURATION: 95 %

## 2019-11-26 VITALS
SYSTOLIC BLOOD PRESSURE: 122 MMHG | BODY MASS INDEX: 24.91 KG/M2 | RESPIRATION RATE: 16 BRPM | WEIGHT: 155 LBS | OXYGEN SATURATION: 95 % | HEIGHT: 66 IN | DIASTOLIC BLOOD PRESSURE: 72 MMHG | HEART RATE: 78 BPM | TEMPERATURE: 98.4 F

## 2019-11-26 DIAGNOSIS — Z00.00 ANNUAL PHYSICAL EXAM: ICD-10-CM

## 2019-11-26 PROCEDURE — 99395 PREV VISIT EST AGE 18-39: CPT | Performed by: PHYSICIAN ASSISTANT

## 2019-11-26 RX ORDER — MULTIVIT-MIN/IRON/FOLIC ACID/K 18-600-40
1 CAPSULE ORAL DAILY
COMMUNITY
End: 2021-07-19

## 2019-11-26 ASSESSMENT — PATIENT HEALTH QUESTIONNAIRE - PHQ9: CLINICAL INTERPRETATION OF PHQ2 SCORE: 0

## 2019-11-26 NOTE — ASSESSMENT & PLAN NOTE
This is a 29-year-old female who is here today for an annual physical.  No new complaints today.  Follows with endocrinology.  Her endocrinologist will be leaving the practice soon.  Takes both Cytomel and Synthroid for her symptoms.  Has new labs to be drawn shortly.  Diagnosed in the past with Hashimoto's thyroiditis.  Takes a 2000 units of vitamin D over-the-counter.  Last vitamin D level at 30.  Still on birth control.  Will spot once monthly about 2 weeks end of the month.  Denies any significant vaginal spotting.  No new complaints today.

## 2019-11-26 NOTE — PATIENT COMMUNICATION
**Last office visit 10/9/19**    Was the patient seen in the last year in this department? Yes    Does patient have an active prescription for medications requested? Yes    Received Request Via: Patient    Day supply: 90

## 2019-11-26 NOTE — PROGRESS NOTES
"Subjective:   Osito Lange is a 29 y.o. female here today for annual physical.    Annual physical exam  This is a 29-year-old female who is here today for an annual physical.  No new complaints today.  Follows with endocrinology.  Her endocrinologist will be leaving the practice soon.  Takes both Cytomel and Synthroid for her symptoms.  Has new labs to be drawn shortly.  Diagnosed in the past with Hashimoto's thyroiditis.  Takes a 2000 units of vitamin D over-the-counter.  Last vitamin D level at 30.  Still on birth control.  Will spot once monthly about 2 weeks end of the month.  Denies any significant vaginal spotting.  No new complaints today.         Current medicines (including changes today)  Current Outpatient Medications   Medication Sig Dispense Refill   • Cholecalciferol (VITAMIN D) 2000 units Cap Take 1 Cap by mouth every day.     • liothyronine (CYTOMEL) 5 MCG Tab Take 1 Tab by mouth every day. 90 Tab 3   • Levonorgest-Eth Estrad 91-Day 0.1-0.02 & 0.01 MG Tab TAKE 1 TABLET BY MOUTH EVERY DAY 91 Tab 0   • SYNTHROID 100 MCG Tab Take 1 tablet each a.m. on  empty stomach 30 Tab 3     No current facility-administered medications for this visit.      She  has a past medical history of Thyroid disease.    Social History and Family History were reviewed and updated.    ROS   No chest pain, no shortness of breath, no abdominal pain and all other systems were reviewed and are negative.       Objective:     /72 (BP Location: Right arm, Patient Position: Sitting, BP Cuff Size: Adult)   Pulse 78   Temp 36.9 °C (98.4 °F) (Temporal)   Resp 16   Ht 1.676 m (5' 6\")   Wt 70.3 kg (155 lb)   SpO2 95%  Body mass index is 25.02 kg/m².   Physical Exam:  Constitutional: Alert, no distress.  Skin: Warm, dry, good turgor, no rashes in visible areas.  Eye: Equal, round and reactive, conjunctiva clear, lids normal.  ENMT: Lips without lesions, good dentition, oropharynx clear.  Neck: Trachea midline, no masses. "   Lymph: No cervical or supraclavicular lymphadenopathy  Respiratory: Unlabored respiratory effort, lungs clear to auscultation, no wheezes, no ronchi.  Cardiovascular: Normal S1, S2, no murmur, no edema.  Psych: Alert and oriented x3, normal affect and mood.        Assessment and Plan:   The following treatment plan was discussed    1. Annual physical exam  General healthy female.  Follow with endocrinology for history of autoimmune hypothyroidism.  Ordered labs.  Fast for my labs.  May also obtain endocrinology labs.  May continue birth control as directed.  Advised monthly routine vaginal bleeding consider spotting is not a concern.  Follow-up or contact me with any concerns.      Followup: Return if symptoms worsen or fail to improve.    Please note that this dictation was created using voice recognition software. I have made every reasonable attempt to correct obvious errors, but I expect that there are errors of grammar and possibly content that I did not discover before finalizing the note.

## 2019-11-28 NOTE — PROGRESS NOTES
"Subjective:   Please cancel as duplicate note.    Current medicines (including changes today)  Current Outpatient Medications   Medication Sig Dispense Refill   • Cholecalciferol (VITAMIN D) 2000 units Cap Take 1 Cap by mouth every day.     • liothyronine (CYTOMEL) 5 MCG Tab Take 1 Tab by mouth every day. 90 Tab 3   • Levonorgest-Eth Estrad 91-Day 0.1-0.02 & 0.01 MG Tab TAKE 1 TABLET BY MOUTH EVERY DAY 91 Tab 0   • SYNTHROID 100 MCG Tab Take 1 tablet each a.m. on  empty stomach 30 Tab 3     No current facility-administered medications for this visit.      She  has a past medical history of Thyroid disease.    Social History and Family History were reviewed and updated.    ROS  No chest pain, no shortness of breath, no abdominal pain and all other systems were reviewed and are negative.       Objective:     /72 (BP Location: Right arm, Patient Position: Sitting, BP Cuff Size: Adult)   Pulse 78   Temp 36.9 °C (98.4 °F) (Temporal)   Resp 16   Ht 1.676 m (5' 6\")   Wt 70.3 kg (155 lb)   SpO2 95%  Body mass index is 25.02 kg/m².        Assessment and Plan:   The following treatment plan was discussed    Followup: Return if symptoms worsen or fail to improve.    Please note that this dictation was created using voice recognition software. I have made every reasonable attempt to correct obvious errors, but I expect that there are errors of grammar and possibly content that I did not discover before finalizing the note.           "

## 2019-12-09 ENCOUNTER — HOSPITAL ENCOUNTER (OUTPATIENT)
Dept: LAB | Facility: MEDICAL CENTER | Age: 29
End: 2019-12-09
Attending: PHYSICIAN ASSISTANT
Payer: COMMERCIAL

## 2019-12-09 DIAGNOSIS — E06.3 HYPOTHYROIDISM, ACQUIRED, AUTOIMMUNE: ICD-10-CM

## 2019-12-09 DIAGNOSIS — Z00.00 ANNUAL PHYSICAL EXAM: ICD-10-CM

## 2019-12-09 LAB
25(OH)D3 SERPL-MCNC: 40 NG/ML (ref 30–100)
ALBUMIN SERPL BCP-MCNC: 4.1 G/DL (ref 3.2–4.9)
ALBUMIN/GLOB SERPL: 1.4 G/DL
ALP SERPL-CCNC: 39 U/L (ref 30–99)
ALT SERPL-CCNC: 62 U/L (ref 2–50)
ANION GAP SERPL CALC-SCNC: 6 MMOL/L (ref 0–11.9)
AST SERPL-CCNC: 60 U/L (ref 12–45)
BASOPHILS # BLD AUTO: 0.6 % (ref 0–1.8)
BASOPHILS # BLD: 0.03 K/UL (ref 0–0.12)
BILIRUB SERPL-MCNC: 0.5 MG/DL (ref 0.1–1.5)
BUN SERPL-MCNC: 13 MG/DL (ref 8–22)
CALCIUM SERPL-MCNC: 9.5 MG/DL (ref 8.5–10.5)
CHLORIDE SERPL-SCNC: 108 MMOL/L (ref 96–112)
CHOLEST SERPL-MCNC: 174 MG/DL (ref 100–199)
CO2 SERPL-SCNC: 27 MMOL/L (ref 20–33)
CREAT SERPL-MCNC: 0.97 MG/DL (ref 0.5–1.4)
EOSINOPHIL # BLD AUTO: 0.14 K/UL (ref 0–0.51)
EOSINOPHIL NFR BLD: 2.8 % (ref 0–6.9)
ERYTHROCYTE [DISTWIDTH] IN BLOOD BY AUTOMATED COUNT: 43.3 FL (ref 35.9–50)
FASTING STATUS PATIENT QL REPORTED: NORMAL
GLOBULIN SER CALC-MCNC: 2.9 G/DL (ref 1.9–3.5)
GLUCOSE SERPL-MCNC: 89 MG/DL (ref 65–99)
HCT VFR BLD AUTO: 43.3 % (ref 37–47)
HDLC SERPL-MCNC: 66 MG/DL
HGB BLD-MCNC: 14.3 G/DL (ref 12–16)
IMM GRANULOCYTES # BLD AUTO: 0.01 K/UL (ref 0–0.11)
IMM GRANULOCYTES NFR BLD AUTO: 0.2 % (ref 0–0.9)
LDLC SERPL CALC-MCNC: 94 MG/DL
LYMPHOCYTES # BLD AUTO: 1.65 K/UL (ref 1–4.8)
LYMPHOCYTES NFR BLD: 32.7 % (ref 22–41)
MCH RBC QN AUTO: 31.8 PG (ref 27–33)
MCHC RBC AUTO-ENTMCNC: 33 G/DL (ref 33.6–35)
MCV RBC AUTO: 96.4 FL (ref 81.4–97.8)
MONOCYTES # BLD AUTO: 0.41 K/UL (ref 0–0.85)
MONOCYTES NFR BLD AUTO: 8.1 % (ref 0–13.4)
NEUTROPHILS # BLD AUTO: 2.8 K/UL (ref 2–7.15)
NEUTROPHILS NFR BLD: 55.6 % (ref 44–72)
NRBC # BLD AUTO: 0 K/UL
NRBC BLD-RTO: 0 /100 WBC
PLATELET # BLD AUTO: 235 K/UL (ref 164–446)
PMV BLD AUTO: 9.6 FL (ref 9–12.9)
POTASSIUM SERPL-SCNC: 4.2 MMOL/L (ref 3.6–5.5)
PROT SERPL-MCNC: 7 G/DL (ref 6–8.2)
RBC # BLD AUTO: 4.49 M/UL (ref 4.2–5.4)
SODIUM SERPL-SCNC: 141 MMOL/L (ref 135–145)
T3 SERPL-MCNC: 130.2 NG/DL (ref 60–181)
T3FREE SERPL-MCNC: 3.78 PG/ML (ref 2.4–4.2)
T4 FREE SERPL-MCNC: 1.07 NG/DL (ref 0.53–1.43)
TRIGL SERPL-MCNC: 72 MG/DL (ref 0–149)
TSH SERPL DL<=0.005 MIU/L-ACNC: 0.62 UIU/ML (ref 0.38–5.33)
WBC # BLD AUTO: 5 K/UL (ref 4.8–10.8)

## 2019-12-09 PROCEDURE — 85025 COMPLETE CBC W/AUTO DIFF WBC: CPT

## 2019-12-09 PROCEDURE — 84443 ASSAY THYROID STIM HORMONE: CPT

## 2019-12-09 PROCEDURE — 84481 FREE ASSAY (FT-3): CPT

## 2019-12-09 PROCEDURE — 36415 COLL VENOUS BLD VENIPUNCTURE: CPT

## 2019-12-09 PROCEDURE — 80053 COMPREHEN METABOLIC PANEL: CPT

## 2019-12-09 PROCEDURE — 82306 VITAMIN D 25 HYDROXY: CPT

## 2019-12-09 PROCEDURE — 84480 ASSAY TRIIODOTHYRONINE (T3): CPT

## 2019-12-09 PROCEDURE — 84439 ASSAY OF FREE THYROXINE: CPT

## 2019-12-09 PROCEDURE — 80061 LIPID PANEL: CPT

## 2019-12-10 DIAGNOSIS — R74.8 ELEVATED LIVER ENZYMES: ICD-10-CM

## 2019-12-11 ENCOUNTER — OFFICE VISIT (OUTPATIENT)
Dept: ENDOCRINOLOGY | Facility: MEDICAL CENTER | Age: 29
End: 2019-12-11
Payer: COMMERCIAL

## 2019-12-11 VITALS
WEIGHT: 155 LBS | OXYGEN SATURATION: 97 % | HEART RATE: 94 BPM | DIASTOLIC BLOOD PRESSURE: 64 MMHG | SYSTOLIC BLOOD PRESSURE: 100 MMHG | HEIGHT: 66 IN | BODY MASS INDEX: 24.91 KG/M2

## 2019-12-11 DIAGNOSIS — E55.9 VITAMIN D DEFICIENCY: ICD-10-CM

## 2019-12-11 DIAGNOSIS — R53.83 OTHER FATIGUE: ICD-10-CM

## 2019-12-11 DIAGNOSIS — E03.8 HYPOTHYROIDISM DUE TO HASHIMOTO'S THYROIDITIS: ICD-10-CM

## 2019-12-11 DIAGNOSIS — E06.3 HYPOTHYROIDISM DUE TO HASHIMOTO'S THYROIDITIS: ICD-10-CM

## 2019-12-11 PROCEDURE — 99213 OFFICE O/P EST LOW 20 MIN: CPT | Performed by: PHYSICIAN ASSISTANT

## 2019-12-11 NOTE — PROGRESS NOTES
Endocrinology Clinic Progress Note  PCP: Dm Irvin P.A.-C.    HPI:  Osito Lange is a 29 y.o. old patient who comes in today for review of endocrine problems.    1. Hypothyroidism due to Hashimoto's thyroiditis  Current regimen   Cytomel 5 mcg daily   Synthroid 100 mcg daily    Patient is currently complaining of brain fog and fatigue at times. She has noticed symptoms over the last 2 weeks     She is looking to become pregnant in the next 1-2 years      Ref. Range 8/14/2019 15:11 9/27/2019 10:28 12/9/2019 08:06   TSH Latest Ref Range: 0.380 - 5.330 uIU/mL 0.750 0.300 (L) 0.620   Free T-4 Latest Ref Range: 0.53 - 1.43 ng/dL 1.07 1.11 1.07   T3 Latest Ref Range: 60.0 - 181.0 ng/dL 86.6 111.4 130.2   T3,Free Latest Ref Range: 2.40 - 4.20 pg/mL 3.32 3.39 3.78       2. Hashimoto's thyroiditis  Patient denies any swelling of her throat and/or difficulty swallowing.      3. Vitamin D deficiency  She has been taking her vitamin D at 2000 IUs.     Ref. Range 12/9/2019 08:05   25-Hydroxy   Vitamin D 25 Latest Ref Range: 30 - 100 ng/mL 40       ROS:  Constitutional: No weight loss she feels as if she has had weight gain.,  fever  HEENT: No difficulty with swallowing, change in voice,   Cardiac: No chest pain, palpitations, or racing heart  Resp: No shortness of breath  GI: No abdominal pain, nausea, vomiting, or diarrhea   Neuro: No numbness or tinging in feet  Endo: No heat or cold intolerance, no polyuria or polydipsia    Family history:  Mother with history of pituitary tumor thyroid enlargement and eventually thyroidectomy  Maternal aunt with goiter  Sister with thyroidectomy  According to the patient they do not have any thyroid cancer.  She has family members with type 1 diabetes.      Past Medical History:  Patient Active Problem List    Diagnosis Date Noted   • Hypothyroidism, acquired, autoimmune 09/06/2019   • Encounter for other contraceptive management 11/02/2018   • Annual physical exam 11/02/2018  "  • Hashimoto's thyroiditis 09/15/2017   • Vitamin D deficiency 09/15/2017       Medications:    Current Outpatient Medications:   •  Cholecalciferol (VITAMIN D) 2000 units Cap, Take 1 Cap by mouth every day., Disp: , Rfl:   •  liothyronine (CYTOMEL) 5 MCG Tab, Take 1 Tab by mouth every day., Disp: 90 Tab, Rfl: 3  •  Levonorgest-Eth Estrad 91-Day 0.1-0.02 & 0.01 MG Tab, TAKE 1 TABLET BY MOUTH EVERY DAY, Disp: 91 Tab, Rfl: 0  •  SYNTHROID 100 MCG Tab, Take 1 tablet each a.m. on  empty stomach, Disp: 30 Tab, Rfl: 3    Labs: Reviewed as above     Physical Examination:  Vital signs: /64 (BP Location: Left arm, Patient Position: Sitting, BP Cuff Size: Adult)   Pulse 94   Ht 1.676 m (5' 6\")   Wt 70.3 kg (155 lb)   SpO2 97%   BMI 25.02 kg/m²  Body mass index is 25.02 kg/m².  General: No apparent distress, cooperative  Eyes: No scleral icterus, no discharge, normal eyelids  Neck: No abnormal masses on inspection, normal thyroid exam  Resp: Normal effort, clear to auscultation bilaterally  CVS: Regular rate and rhythm, S1 S2 normal, no murmur  Extremities: No lower extremity edema  Musculoskeletal: Normal digits and nails  Psych: Alert and oriented, normal mood and affect, intact memory and able to make informed decisions.    Assessment and Plan:    1. Hypothyroidism due to Hashimoto's thyroiditis  Continue:   Cytomel 5 mcg daily   Synthroid 100 mcg daily     I have reviewed the with the patient today recommendations during pregnancy.  I did recommend that when her and her  decide that they do want to become pregnant she should start a prenatal vitamin in addition to a phone call to our office as we would do want to potentially wean her off of her Cytomel.  We did discuss that T3 does not cross placental barrier and is usually not recommended for during pregnancy.    2. Vitamin D deficiency  2000 continue chronic vitamin replacement    Return in about 6 months (around 6/11/2020).    Thank you for allowing " me to participate in the care of this patient.  If you have any questions or concerns please do not hesitate to contact me.    Niesha Wise P.A.-C.    CC:   Dm Irvin P.A.-C.    This note was created using voice recognition software (Dragon). The accuracy of the dictation is limited by the abilities of the software. I have reviewed the note prior to signing, however some errors in grammar and context are still possible. If you have any questions related to this note please do not hesitate to contact our office.

## 2020-01-14 DIAGNOSIS — E06.3 HYPOTHYROIDISM, ACQUIRED, AUTOIMMUNE: ICD-10-CM

## 2020-01-15 RX ORDER — LEVOTHYROXINE SODIUM 100 MCG
TABLET ORAL
Qty: 30 TAB | Refills: 3 | Status: SHIPPED | OUTPATIENT
Start: 2020-01-15 | End: 2020-01-30 | Stop reason: SDUPTHER

## 2020-01-15 NOTE — TELEPHONE ENCOUNTER
Was the patient seen in the last year in this department? Yes    Does patient have an active prescription for medications requested? Yes    Received Request Via: Pharmacy     Mary w/ Dr. Barajas 04/20/20  SYNTHROID 100 MCG Tab        Sig: TAKE 1 TABLET EACH MORNING ON EMPTY STOMACH

## 2020-01-30 ENCOUNTER — OFFICE VISIT (OUTPATIENT)
Dept: MEDICAL GROUP | Facility: MEDICAL CENTER | Age: 30
End: 2020-01-30
Payer: COMMERCIAL

## 2020-01-30 VITALS
WEIGHT: 153 LBS | DIASTOLIC BLOOD PRESSURE: 66 MMHG | SYSTOLIC BLOOD PRESSURE: 102 MMHG | BODY MASS INDEX: 24.59 KG/M2 | TEMPERATURE: 99.8 F | RESPIRATION RATE: 16 BRPM | HEIGHT: 66 IN | HEART RATE: 92 BPM | OXYGEN SATURATION: 95 %

## 2020-01-30 DIAGNOSIS — R23.8 SKIN IRRITATION: ICD-10-CM

## 2020-01-30 DIAGNOSIS — E06.3 HASHIMOTO'S THYROIDITIS: ICD-10-CM

## 2020-01-30 DIAGNOSIS — R22.0 LIP SWELLING: ICD-10-CM

## 2020-01-30 DIAGNOSIS — E06.3 HYPOTHYROIDISM, ACQUIRED, AUTOIMMUNE: ICD-10-CM

## 2020-01-30 PROBLEM — Z00.00 ANNUAL PHYSICAL EXAM: Status: RESOLVED | Noted: 2018-11-02 | Resolved: 2020-01-30

## 2020-01-30 PROCEDURE — 99214 OFFICE O/P EST MOD 30 MIN: CPT | Performed by: PHYSICIAN ASSISTANT

## 2020-01-30 RX ORDER — LEVOTHYROXINE SODIUM 100 MCG
TABLET ORAL
Qty: 30 TAB | Refills: 5 | Status: SHIPPED | OUTPATIENT
Start: 2020-01-30 | End: 2020-04-20 | Stop reason: SDUPTHER

## 2020-01-30 RX ORDER — LIOTHYRONINE SODIUM 5 UG/1
5 TABLET ORAL
Qty: 30 TAB | Refills: 5 | Status: SHIPPED | OUTPATIENT
Start: 2020-01-30 | End: 2020-04-20 | Stop reason: SDUPTHER

## 2020-01-30 ASSESSMENT — PATIENT HEALTH QUESTIONNAIRE - PHQ9: CLINICAL INTERPRETATION OF PHQ2 SCORE: 0

## 2020-01-30 NOTE — ASSESSMENT & PLAN NOTE
This is a 29-year-old female who is here today complaining of an approximate 1 week history of lower lip swelling and blistering.  Symptoms happened after she started using Dania bees lip balm.  States she used it multiple x1 day and then developed swelling the lips with blistering and oozing.  Has a history of a staph infection of the lip and was concerned that this was also the issue.  Symptoms though have improved.  She complained this continued swelling but denies any significant pain or discharge from the lip.  Prior to that she was using a Chapstick which was more effective and did not cause any issues.  Denies any fevers.  No difficulty swallowing or shortness of breath.

## 2020-01-30 NOTE — ASSESSMENT & PLAN NOTE
Also complains of a chronic history of having skin irritation to the face with other products.  Would like to establish with a dermatologist.

## 2020-01-30 NOTE — PROGRESS NOTES
Subjective:   Osito Lange is a 29 y.o. female here today for lip swelling for 1 week, hypothyroidism and skin irritation.    Lip swelling  This is a 29-year-old female who is here today complaining of an approximate 1 week history of lower lip swelling and blistering.  Symptoms happened after she started using Dania bees lip balm.  States she used it multiple x1 day and then developed swelling the lips with blistering and oozing.  Has a history of a staph infection of the lip and was concerned that this was also the issue.  Symptoms though have improved.  She complained this continued swelling but denies any significant pain or discharge from the lip.  Prior to that she was using a Chapstick which was more effective and did not cause any issues.  Denies any fevers.  No difficulty swallowing or shortness of breath.    Hypothyroidism, acquired, autoimmune  Chronic condition.  Requesting refill today of her thyroid medications.  Her endocrinologist left the practice but she has a follow-up appointment with another one at the site.  Her thyroid levels have been in good range.    Skin irritation  Also complains of a chronic history of having skin irritation to the face with other products.  Would like to establish with a dermatologist.       Current medicines (including changes today)  Current Outpatient Medications   Medication Sig Dispense Refill   • SYNTHROID 100 MCG Tab TAKE 1 TABLET EACH MORNING ON EMPTY STOMACH 30 Tab 5   • liothyronine (CYTOMEL) 5 MCG Tab Take 1 Tab by mouth every day. 30 Tab 5   • mupirocin (BACTROBAN) 2 % Ointment Apply 1 Application to affected area(s) every day. 1 Tube 1   • Cholecalciferol (VITAMIN D) 2000 units Cap Take 1 Cap by mouth every day.     • Levonorgest-Eth Estrad 91-Day 0.1-0.02 & 0.01 MG Tab TAKE 1 TABLET BY MOUTH EVERY DAY 91 Tab 0     No current facility-administered medications for this visit.      She  has a past medical history of Thyroid disease.    Social History  "and Family History were reviewed and updated.    ROS   No chest pain, no shortness of breath, no abdominal pain and all other systems were reviewed and are negative.       Objective:     /66 (BP Location: Right arm, Patient Position: Sitting, BP Cuff Size: Adult)   Pulse 92   Temp 37.7 °C (99.8 °F) (Temporal)   Resp 16   Ht 1.676 m (5' 6\")   Wt 69.4 kg (153 lb)   SpO2 95%  Body mass index is 24.69 kg/m².   Physical Exam:  Constitutional: Alert, no distress.  Skin: Warm, dry, good turgor.  Lower lip does appear more swollen than in the past and there are 2 scabs.  No erythema noted.  Eye: Equal, round and reactive, conjunctiva clear, lids normal.  ENMT: Lips without lesions, good dentition, oropharynx clear.  Neck: Trachea midline, no masses.   Lymph: No cervical or supraclavicular lymphadenopathy  Respiratory: Unlabored respiratory effort, lungs appear clear, no wheezes.  Cardiovascular: Regular rate and rhythm.  Psych: Alert and oriented x3, normal affect and mood.        Assessment and Plan:   The following treatment plan was discussed    1. Lip swelling  Acute, new onset condition.  Likely an allergic reaction.  This point would minimize use of anything.  Do not apply rubbing alcohol.  Would use a general Chapstick.  I did refer to dermatology.  She may contact me if symptoms occur like this in the future for a course of steroids.  May continue Benadryl as needed.  - REFERRAL TO DERMATOLOGY  - mupirocin (BACTROBAN) 2 % Ointment; Apply 1 Application to affected area(s) every day.  Dispense: 1 Tube; Refill: 1    2. Hypothyroidism, acquired, autoimmune  Chronic condition.  Stable.  Renewed Cytomel and Synthroid.  - SYNTHROID 100 MCG Tab; TAKE 1 TABLET EACH MORNING ON EMPTY STOMACH  Dispense: 30 Tab; Refill: 5  - liothyronine (CYTOMEL) 5 MCG Tab; Take 1 Tab by mouth every day.  Dispense: 30 Tab; Refill: 5    3. Hashimoto's thyroiditis  Chronic condition.  Stable.  Renewed medications.  Follow with " endocrinology.  - SYNTHROID 100 MCG Tab; TAKE 1 TABLET EACH MORNING ON EMPTY STOMACH  Dispense: 30 Tab; Refill: 5  - liothyronine (CYTOMEL) 5 MCG Tab; Take 1 Tab by mouth every day.  Dispense: 30 Tab; Refill: 5    4. Skin irritation  Chronic condition.  Unknown irritant.  Refer to dermatology and also provided Bactroban given a history of staph infections.  May use if the lesions on her face start to become more crusted with oozing.  - REFERRAL TO DERMATOLOGY  - mupirocin (BACTROBAN) 2 % Ointment; Apply 1 Application to affected area(s) every day.  Dispense: 1 Tube; Refill: 1      Followup: Return if symptoms worsen or fail to improve.    Please note that this dictation was created using voice recognition software. I have made every reasonable attempt to correct obvious errors, but I expect that there are errors of grammar and possibly content that I did not discover before finalizing the note.

## 2020-01-30 NOTE — ASSESSMENT & PLAN NOTE
Chronic condition.  Requesting refill today of her thyroid medications.  Her endocrinologist left the practice but she has a follow-up appointment with another one at the site.  Her thyroid levels have been in good range.   Routing refill request to provider for review/approval because:  Drug not on the [facility] refill protocol   Leeann Santizo,Clinic Rn  [facility]

## 2020-01-31 DIAGNOSIS — Z30.8 ENCOUNTER FOR OTHER CONTRACEPTIVE MANAGEMENT: ICD-10-CM

## 2020-01-31 RX ORDER — LEVONORGESTREL AND ETHINYL ESTRADIOL 100-20(84)
KIT ORAL
Qty: 91 TAB | Refills: 0 | Status: SHIPPED | OUTPATIENT
Start: 2020-01-31 | End: 2020-05-03

## 2020-04-10 ENCOUNTER — HOSPITAL ENCOUNTER (OUTPATIENT)
Dept: LAB | Facility: MEDICAL CENTER | Age: 30
End: 2020-04-10
Attending: PHYSICIAN ASSISTANT
Payer: COMMERCIAL

## 2020-04-10 DIAGNOSIS — E06.3 HYPOTHYROIDISM DUE TO HASHIMOTO'S THYROIDITIS: ICD-10-CM

## 2020-04-10 DIAGNOSIS — R74.8 ELEVATED LIVER ENZYMES: ICD-10-CM

## 2020-04-10 DIAGNOSIS — E55.9 VITAMIN D DEFICIENCY: ICD-10-CM

## 2020-04-10 DIAGNOSIS — R53.83 OTHER FATIGUE: ICD-10-CM

## 2020-04-10 DIAGNOSIS — E03.8 HYPOTHYROIDISM DUE TO HASHIMOTO'S THYROIDITIS: ICD-10-CM

## 2020-04-10 LAB
25(OH)D3 SERPL-MCNC: 53 NG/ML (ref 30–100)
ALBUMIN SERPL BCP-MCNC: 4.4 G/DL (ref 3.2–4.9)
ALP SERPL-CCNC: 50 U/L (ref 30–99)
ALT SERPL-CCNC: 15 U/L (ref 2–50)
AST SERPL-CCNC: 20 U/L (ref 12–45)
BILIRUB CONJ SERPL-MCNC: <0.2 MG/DL (ref 0.1–0.5)
BILIRUB INDIRECT SERPL-MCNC: NORMAL MG/DL (ref 0–1)
BILIRUB SERPL-MCNC: 0.5 MG/DL (ref 0.1–1.5)
PROT SERPL-MCNC: 7.3 G/DL (ref 6–8.2)
T3 SERPL-MCNC: 134 NG/DL (ref 60–181)
T3FREE SERPL-MCNC: 3.69 PG/ML (ref 2.4–4.2)
T4 FREE SERPL-MCNC: 1.54 NG/DL (ref 0.58–1.64)
TSH SERPL DL<=0.005 MIU/L-ACNC: 0.44 UIU/ML (ref 0.38–5.33)
VIT B12 SERPL-MCNC: 460 PG/ML (ref 211–911)

## 2020-04-10 PROCEDURE — 82306 VITAMIN D 25 HYDROXY: CPT

## 2020-04-10 PROCEDURE — 84443 ASSAY THYROID STIM HORMONE: CPT

## 2020-04-10 PROCEDURE — 84481 FREE ASSAY (FT-3): CPT

## 2020-04-10 PROCEDURE — 84439 ASSAY OF FREE THYROXINE: CPT

## 2020-04-10 PROCEDURE — 84480 ASSAY TRIIODOTHYRONINE (T3): CPT

## 2020-04-10 PROCEDURE — 80076 HEPATIC FUNCTION PANEL: CPT

## 2020-04-10 PROCEDURE — 36415 COLL VENOUS BLD VENIPUNCTURE: CPT

## 2020-04-10 PROCEDURE — 82607 VITAMIN B-12: CPT

## 2020-04-18 NOTE — PROGRESS NOTES
Endocrinology Clinic Progress Note  PCP: Dm Irvin P.A.-C.    HPI:  This encounter was conducted via Zoom.  Verbal consent was obtained.      Osito Lange is a 30 y.o. old patient who comes in today for review of endocrine problems.  This is a patient that is new to me who was formerly seen by Niesha LANGLEY.  She states her main complaint is that she is working out 5-7 times/week and still having a hard time losing weight.  She states her sex drive is low and she is interested in going off birth control.    Hypothyroidism:  Currently taking Cytomel 5 mcg daily and Synthroid 100 mcg x 6 days and 150 mcg on the 7th day.  Results for OSITO LANGE (MRN [id number]) as of 4/18/2020 08:32   Ref. Range 4/10/2020 09:35   TSH Latest Ref Range: 0.380 - 5.330 uIU/mL 0.436   Free T-4 Latest Ref Range: 0.58 - 1.64 ng/dL 1.54   T3 Latest Ref Range: 60.0 - 181.0 ng/dL 134.0   T3,Free Latest Ref Range: 2.40 - 4.20 pg/mL 3.69     Vitamin D Deficiency:  Currently taking taking her vitamin D 2000 units daily. Results for OSITO LANGE (MRN [id number]) as of 4/18/2020 08:32   Ref. Range 4/10/2020 09:35   25-Hydroxy   Vitamin D 25 Latest Ref Range: 30 - 100 ng/mL 53     Fatigue:  Feeling less fatigued and having less brain fog.  Started taking Vitamin B 12 1000 mcg daily.  Results for OSITO LANGE (MRN [id number]) as of 4/18/2020 08:32   Ref. Range 4/10/2020 09:35   Vitamin B12 -True Cobalamin Latest Ref Range: 211 - 911 pg/mL 460        ROS:  Constitutional: No unintentional weight loss, weight gain of 15 lbs not sure if all of it is muscle mass.   Endo: Denies excessive thirst or frequent urination  All other systems were reviewed and were negative.    Past Medical History:  Patient Active Problem List    Diagnosis Date Noted   • Lip swelling 01/30/2020   • Skin irritation 01/30/2020   • Hypothyroidism, acquired, autoimmune 09/06/2019   • Encounter for other contraceptive management 11/02/2018   •  Hashimoto's thyroiditis 09/15/2017   • Vitamin D deficiency 09/15/2017       Medications:    Current Outpatient Medications:   •  Cyanocobalamin (VITAMIN B 12 PO), Take 1,000 mcg by mouth every day., Disp: , Rfl:   •  clobetasol (TEMOVATE) 0.05 % external solution, HAIR AND SCALP, Disp: , Rfl:   •  metronidazole (METROLOTION) 0.75 % Lotion, , Disp: , Rfl:   •  liothyronine (CYTOMEL) 5 MCG Tab, Take 1 Tab by mouth every day., Disp: 96 Tab, Rfl: 3  •  levothyroxine (SYNTHROID) 112 MCG Tab, Take 1 Tab by mouth Every morning on an empty stomach. Synthroid brand medically necessary., Disp: 90 Tab, Rfl: 3  •  Levonorgest-Eth Estrad 91-Day 0.1-0.02 & 0.01 MG Tab, TAKE 1 TABLET BY MOUTH EVERY DAY, Disp: 91 Tab, Rfl: 0  •  mupirocin (BACTROBAN) 2 % Ointment, Apply 1 Application to affected area(s) every day., Disp: 1 Tube, Rfl: 1  •  Cholecalciferol (VITAMIN D) 2000 units Cap, Take 1 Cap by mouth every day., Disp: , Rfl:     Labs: Reviewed    Physical Examination:  Vital signs: There were no vitals taken for this visit. There is no height or weight on file to calculate BMI. Patient's body mass index is unknown because there is no height or weight on file. Exercise and nutrition counseling were performed at this visit.  General: No apparent distress, cooperative  Eyes: No scleral icterus or discharge  ENMT: Normal on external inspection of nose, lips, normal thyroid on inspection  Neck: No abnormal masses on inspection  Resp: Normal effort  Extremities: No visible edema  Neuro: Alert and oriented  Skin: No visible rash  Psych: Normal mood and affect, intact memory and able to make informed decisions    Assessment and Plan:    1. Hypothyroidism due to Hashimoto's thyroiditis  Will do synthroid 112 mcg daily; continue cytomel 5 mcg daily; weight gain not related to thyroid problems.   Stop birth control as patient feels it to be the contributory factor; she is okay getting pregnant    2. Vitamin D deficiency  Cont vit with  food.     3. Other fatigue  Improved with optimal vit D and B 12 replacement and also thyroid hormone.    4. Low B 12:  Target above 500; increase to 2000 mcg of sublingual B 12.     Return in about 3 months (around 7/20/2020).    Thank you for allowing me to participate in the care of this patient.        CC:   Dm Irvin P.A.-C.    This note was created using voice recognition software (Dragon). The accuracy of the dictation is limited by the abilities of the software. I have reviewed the note prior to signing, however some errors in grammar and context are still possible. If you have any questions related to this note please do not hesitate to contact our office.

## 2020-04-20 ENCOUNTER — TELEMEDICINE (OUTPATIENT)
Dept: ENDOCRINOLOGY | Facility: MEDICAL CENTER | Age: 30
End: 2020-04-20
Payer: COMMERCIAL

## 2020-04-20 DIAGNOSIS — E06.3 HYPOTHYROIDISM DUE TO HASHIMOTO'S THYROIDITIS: ICD-10-CM

## 2020-04-20 DIAGNOSIS — E06.3 HYPOTHYROIDISM, ACQUIRED, AUTOIMMUNE: ICD-10-CM

## 2020-04-20 DIAGNOSIS — R53.83 OTHER FATIGUE: ICD-10-CM

## 2020-04-20 DIAGNOSIS — E55.9 VITAMIN D DEFICIENCY: ICD-10-CM

## 2020-04-20 DIAGNOSIS — E03.8 HYPOTHYROIDISM DUE TO HASHIMOTO'S THYROIDITIS: ICD-10-CM

## 2020-04-20 DIAGNOSIS — E53.8 VITAMIN B 12 DEFICIENCY: ICD-10-CM

## 2020-04-20 DIAGNOSIS — E06.3 HASHIMOTO'S THYROIDITIS: ICD-10-CM

## 2020-04-20 PROCEDURE — 99214 OFFICE O/P EST MOD 30 MIN: CPT | Mod: 95,CR | Performed by: INTERNAL MEDICINE

## 2020-04-20 RX ORDER — LIOTHYRONINE SODIUM 5 UG/1
5 TABLET ORAL
Qty: 96 TAB | Refills: 3 | Status: SHIPPED | OUTPATIENT
Start: 2020-04-20 | End: 2021-04-26 | Stop reason: SDUPTHER

## 2020-04-20 RX ORDER — METRONIDAZOLE 7.5 MG/G
LOTION TOPICAL
COMMUNITY
Start: 2020-04-01 | End: 2021-03-31

## 2020-04-20 RX ORDER — LEVOTHYROXINE SODIUM 112 UG/1
112 TABLET ORAL
Qty: 90 TAB | Refills: 3 | Status: SHIPPED | OUTPATIENT
Start: 2020-04-20 | End: 2021-02-03

## 2020-04-20 RX ORDER — LEVOTHYROXINE SODIUM 100 MCG
TABLET ORAL
Qty: 102 TAB | Refills: 3 | Status: SHIPPED | OUTPATIENT
Start: 2020-04-20 | End: 2020-04-20

## 2020-04-20 RX ORDER — CLOBETASOL PROPIONATE 0.46 MG/ML
SOLUTION TOPICAL
COMMUNITY
Start: 2020-04-01 | End: 2021-03-31

## 2020-07-05 DIAGNOSIS — B37.9 YEAST INFECTION: ICD-10-CM

## 2020-07-05 RX ORDER — FLUCONAZOLE 150 MG/1
150 TABLET ORAL DAILY
Qty: 2 TAB | Refills: 0 | Status: SHIPPED | OUTPATIENT
Start: 2020-07-05 | End: 2020-07-07

## 2020-07-22 ENCOUNTER — HOSPITAL ENCOUNTER (OUTPATIENT)
Dept: LAB | Facility: MEDICAL CENTER | Age: 30
End: 2020-07-22
Attending: INTERNAL MEDICINE
Payer: COMMERCIAL

## 2020-07-22 DIAGNOSIS — E53.8 VITAMIN B 12 DEFICIENCY: ICD-10-CM

## 2020-07-22 DIAGNOSIS — E06.3 HYPOTHYROIDISM DUE TO HASHIMOTO'S THYROIDITIS: ICD-10-CM

## 2020-07-22 DIAGNOSIS — E03.8 HYPOTHYROIDISM DUE TO HASHIMOTO'S THYROIDITIS: ICD-10-CM

## 2020-07-22 DIAGNOSIS — E55.9 VITAMIN D DEFICIENCY: ICD-10-CM

## 2020-07-22 LAB
T4 FREE SERPL-MCNC: 1.51 NG/DL (ref 0.93–1.7)
TSH SERPL DL<=0.005 MIU/L-ACNC: 0.32 UIU/ML (ref 0.38–5.33)

## 2020-07-22 PROCEDURE — 84481 FREE ASSAY (FT-3): CPT

## 2020-07-22 PROCEDURE — 84480 ASSAY TRIIODOTHYRONINE (T3): CPT

## 2020-07-22 PROCEDURE — 84439 ASSAY OF FREE THYROXINE: CPT

## 2020-07-22 PROCEDURE — 84443 ASSAY THYROID STIM HORMONE: CPT

## 2020-07-22 PROCEDURE — 82607 VITAMIN B-12: CPT

## 2020-07-22 PROCEDURE — 36415 COLL VENOUS BLD VENIPUNCTURE: CPT

## 2020-07-22 PROCEDURE — 82306 VITAMIN D 25 HYDROXY: CPT

## 2020-07-24 LAB
25(OH)D3 SERPL-MCNC: 58 NG/ML (ref 30–100)
T3 SERPL-MCNC: 89.1 NG/DL (ref 60–181)
T3FREE SERPL-MCNC: 2.73 PG/ML (ref 2–4.4)
VIT B12 SERPL-MCNC: 850 PG/ML (ref 211–911)

## 2020-08-03 ENCOUNTER — APPOINTMENT (OUTPATIENT)
Dept: ENDOCRINOLOGY | Facility: MEDICAL CENTER | Age: 30
End: 2020-08-03
Attending: INTERNAL MEDICINE
Payer: COMMERCIAL

## 2020-08-03 VITALS
DIASTOLIC BLOOD PRESSURE: 60 MMHG | SYSTOLIC BLOOD PRESSURE: 90 MMHG | HEIGHT: 66 IN | WEIGHT: 148 LBS | TEMPERATURE: 98.4 F | BODY MASS INDEX: 23.78 KG/M2

## 2020-08-03 DIAGNOSIS — E06.3 HYPOTHYROIDISM, ACQUIRED, AUTOIMMUNE: ICD-10-CM

## 2020-08-03 DIAGNOSIS — E55.9 VITAMIN D DEFICIENCY: ICD-10-CM

## 2020-08-03 DIAGNOSIS — E53.8 VITAMIN B12 DEFICIENCY: ICD-10-CM

## 2020-08-03 PROCEDURE — 99211 OFF/OP EST MAY X REQ PHY/QHP: CPT | Performed by: INTERNAL MEDICINE

## 2020-08-03 PROCEDURE — 99214 OFFICE O/P EST MOD 30 MIN: CPT | Performed by: INTERNAL MEDICINE

## 2020-08-03 ASSESSMENT — FIBROSIS 4 INDEX: FIB4 SCORE: 0.62

## 2020-08-03 NOTE — PROGRESS NOTES
Endocrinology Clinic Progress Note  PCP: Dm Irvin P.A.-C.    HPI:  Osito Lange is a 30 y.o. old patient who is seen today for review of her endocrine problems.      1. Hypothyroid: currently on Liothyronine 5 mcg per day and Levothyroxine 112 mcg per day.      Ref. Range 7/22/2020 13:32   TSH Latest Ref Range: 0.380 - 5.330 uIU/mL 0.319 (L)   Free T-4 Latest Ref Range: 0.93 - 1.70 ng/dL 1.51   T3 Latest Ref Range: 60.0 - 181.0 ng/dL 89.1   T3,Free Latest Ref Range: 2.00 - 4.40 pg/mL 2.73   2. Vitamin D deficiency: currently on 2000 iu per day     Ref. Range 7/22/2020 13:32   25-Hydroxy   Vitamin D 25 Latest Ref Range: 30 - 100 ng/mL 58     3. Vitamin B 12 deficiency: currently on Vitamin B 12 2000 mcg per day.  States her fatigue has decreased quite a bit and she is actually feeling quite well.      Ref. Range 7/22/2020 13:32   Vitamin B12 -True Cobalamin Latest Ref Range: 211 - 911 pg/mL 850       Libido has improved since stopping birth control and also her weight has come down since then.    ROS:  Constitutional: No weight loss  Cardiac: No palpitations or racing heart  Resp: No shortness of breath  Neuro: No numbness or tinging in feet  Endo: No heat or cold intolerance, no polyuria or polydipsia  All other systems were reviewed and were negative.    Past Medical History:  Patient Active Problem List    Diagnosis Date Noted   • Lip swelling 01/30/2020   • Skin irritation 01/30/2020   • Hypothyroidism, acquired, autoimmune 09/06/2019   • Encounter for other contraceptive management 11/02/2018   • Hashimoto's thyroiditis 09/15/2017   • Vitamin D deficiency 09/15/2017       Past Surgical History:  No past surgical history on file.    Allergies:  Penicillins    Social History:  Social History     Socioeconomic History   • Marital status: Single     Spouse name: Not on file   • Number of children: Not on file   • Years of education: Not on file   • Highest education level: Not on file   Occupational  History   • Not on file   Social Needs   • Financial resource strain: Not on file   • Food insecurity     Worry: Not on file     Inability: Not on file   • Transportation needs     Medical: Not on file     Non-medical: Not on file   Tobacco Use   • Smoking status: Never Smoker   • Smokeless tobacco: Never Used   Substance and Sexual Activity   • Alcohol use: Yes     Comment: rarely   • Drug use: No   • Sexual activity: Yes     Partners: Male     Birth control/protection: Pill     Comment: Fiance, no children   Lifestyle   • Physical activity     Days per week: Not on file     Minutes per session: Not on file   • Stress: Not on file   Relationships   • Social connections     Talks on phone: Not on file     Gets together: Not on file     Attends Evangelical service: Not on file     Active member of club or organization: Not on file     Attends meetings of clubs or organizations: Not on file     Relationship status: Not on file   • Intimate partner violence     Fear of current or ex partner: Not on file     Emotionally abused: Not on file     Physically abused: Not on file     Forced sexual activity: Not on file   Other Topics Concern   • Not on file   Social History Narrative   • Not on file       Family History:  No family history on file.    Medications:    Current Outpatient Medications:   •  Levonorgest-Eth Estrad 91-Day 0.1-0.02 & 0.01 MG Tab, TAKE 1 TABLET BY MOUTH EVERY DAY, Disp: 91 Tab, Rfl: 0  •  Cyanocobalamin (VITAMIN B 12 PO), Take 1,000 mcg by mouth every day., Disp: , Rfl:   •  clobetasol (TEMOVATE) 0.05 % external solution, HAIR AND SCALP, Disp: , Rfl:   •  metronidazole (METROLOTION) 0.75 % Lotion, , Disp: , Rfl:   •  liothyronine (CYTOMEL) 5 MCG Tab, Take 1 Tab by mouth every day., Disp: 96 Tab, Rfl: 3  •  levothyroxine (SYNTHROID) 112 MCG Tab, Take 1 Tab by mouth Every morning on an empty stomach. Synthroid brand medically necessary., Disp: 90 Tab, Rfl: 3  •  mupirocin (BACTROBAN) 2 % Ointment, Apply  1 Application to affected area(s) every day., Disp: 1 Tube, Rfl: 1  •  Cholecalciferol (VITAMIN D) 2000 units Cap, Take 1 Cap by mouth every day., Disp: , Rfl:     Labs: Reviewed    Physical Examination:  General: No apparent distress, cooperative  Eyes: No scleral icterus or discharge  ENMT: Normal on external inspection of nose, lips, normal thyroid exam  Neck: No abnormal masses on inspection  Resp: Normal effort, clear to auscultation bilaterally   CVS: Regular rate and rhythm, S1 S2 normal, no murmur   Extremities: No edema  Abdomen: no abdominal obesity present  Neuro: Alert and oriented  Skin: No rash  Psych: Normal mood and affect, intact memory and able to make informed decisions    Assessment and Plan:  1. Hypothyroidism, acquired, autoimmune  Continue current dose of levothyroxine and liothyronine.   Increase synthroid by 30% as soon as she find out that she is pregnant.     2. Vitamin D deficiency  Cont vit d with food as per HPI    3. Vitamin B 12 deficiency  Continue B 12 as per HPI    RTC in 6 months.     Thank you for allowing me to participate in the care of this patient.    Antonia Rodriguez R.N.  08/03/20    CC:   Dm Irvin P.A.-C.    This note was created using voice recognition software (Dragon). The accuracy of the dictation is limited by the abilities of the software. I have reviewed the note prior to signing, however some errors in grammar and context are still possible. If you have any questions related to this note please do not hesitate to contact our office.

## 2020-08-03 NOTE — PROGRESS NOTES
Endocrinology Clinic Progress Note  PCP: Dm Irvin P.A.-C.    HPI:  Osito Lange is a 30 y.o. old patient who is seen today for review of her endocrine problems.      1. Hypothyroid: currently on Liothyronine 5 mcg per day and Levothyroxine 112 mcg per day.      Ref. Range 7/22/2020 13:32   TSH Latest Ref Range: 0.380 - 5.330 uIU/mL 0.319 (L)   Free T-4 Latest Ref Range: 0.93 - 1.70 ng/dL 1.51   T3 Latest Ref Range: 60.0 - 181.0 ng/dL 89.1   T3,Free Latest Ref Range: 2.00 - 4.40 pg/mL 2.73   2. Vitamin D deficiency: currently on 2000 iu per day     Ref. Range 7/22/2020 13:32   25-Hydroxy   Vitamin D 25 Latest Ref Range: 30 - 100 ng/mL 58       ROS:  Constitutional: No weight loss  Cardiac: No palpitations or racing heart  Resp: No shortness of breath  Neuro: No numbness or tinging in feet  Endo: No heat or cold intolerance, no polyuria or polydipsia  All other systems were reviewed and were negative.    Past Medical History:  Patient Active Problem List    Diagnosis Date Noted   • Lip swelling 01/30/2020   • Skin irritation 01/30/2020   • Hypothyroidism, acquired, autoimmune 09/06/2019   • Encounter for other contraceptive management 11/02/2018   • Hashimoto's thyroiditis 09/15/2017   • Vitamin D deficiency 09/15/2017       Past Surgical History:  No past surgical history on file.    Allergies:  Penicillins    Social History:  Social History     Socioeconomic History   • Marital status: Single     Spouse name: Not on file   • Number of children: Not on file   • Years of education: Not on file   • Highest education level: Not on file   Occupational History   • Not on file   Social Needs   • Financial resource strain: Not on file   • Food insecurity     Worry: Not on file     Inability: Not on file   • Transportation needs     Medical: Not on file     Non-medical: Not on file   Tobacco Use   • Smoking status: Never Smoker   • Smokeless tobacco: Never Used   Substance and Sexual Activity   • Alcohol use:  Yes     Comment: rarely   • Drug use: No   • Sexual activity: Yes     Partners: Male     Birth control/protection: Pill     Comment: Fiance, no children   Lifestyle   • Physical activity     Days per week: Not on file     Minutes per session: Not on file   • Stress: Not on file   Relationships   • Social connections     Talks on phone: Not on file     Gets together: Not on file     Attends [employer] service: Not on file     Active member of club or organization: Not on file     Attends meetings of clubs or organizations: Not on file     Relationship status: Not on file   • Intimate partner violence     Fear of current or ex partner: Not on file     Emotionally abused: Not on file     Physically abused: Not on file     Forced sexual activity: Not on file   Other Topics Concern   • Not on file   Social History Narrative   • Not on file       Family History:  No family history on file.    Medications:    Current Outpatient Medications:   •  Levonorgest-Eth Estrad 91-Day 0.1-0.02 & 0.01 MG Tab, TAKE 1 TABLET BY MOUTH EVERY DAY, Disp: 91 Tab, Rfl: 0  •  Cyanocobalamin (VITAMIN B 12 PO), Take 1,000 mcg by mouth every day., Disp: , Rfl:   •  clobetasol (TEMOVATE) 0.05 % external solution, HAIR AND SCALP, Disp: , Rfl:   •  metronidazole (METROLOTION) 0.75 % Lotion, , Disp: , Rfl:   •  liothyronine (CYTOMEL) 5 MCG Tab, Take 1 Tab by mouth every day., Disp: 96 Tab, Rfl: 3  •  levothyroxine (SYNTHROID) 112 MCG Tab, Take 1 Tab by mouth Every morning on an empty stomach. Synthroid brand medically necessary., Disp: 90 Tab, Rfl: 3  •  mupirocin (BACTROBAN) 2 % Ointment, Apply 1 Application to affected area(s) every day., Disp: 1 Tube, Rfl: 1  •  Cholecalciferol (VITAMIN D) 2000 units Cap, Take 1 Cap by mouth every day., Disp: , Rfl:     Labs: Reviewed    Physical Examination:  Vital signs: There were no vitals taken for this visit. There is no height or weight on file to calculate BMI.  General: No apparent distress,  cooperative  Eyes: No scleral icterus or discharge  ENMT: Normal on external inspection of nose, lips, normal thyroid exam  Neck: No abnormal masses on inspection  Resp: Normal effort, clear to auscultation bilaterally   CVS: Regular rate and rhythm, S1 S2 normal, no murmur   Extremities: No edema  Abdomen: abdominal obesity present  Neuro: Alert and oriented  Skin: No rash  Psych: Normal mood and affect, intact memory and able to make informed decisions    Assessment and Plan:  1. Hypothyroidism, acquired, autoimmune  ***    2. Vitamin D deficiency  ***      No follow-ups on file.    Thank you for allowing me to participate in the care of this patient.    Antonia Rodriguez R.N.  08/03/20    CC:   Dm Irvin P.A.-C.    This note was created using voice recognition software (Dragon). The accuracy of the dictation is limited by the abilities of the software. I have reviewed the note prior to signing, however some errors in grammar and context are still possible. If you have any questions related to this note please do not hesitate to contact our office.

## 2020-12-17 ENCOUNTER — TELEPHONE (OUTPATIENT)
Dept: ENDOCRINOLOGY | Facility: MEDICAL CENTER | Age: 30
End: 2020-12-17

## 2020-12-17 NOTE — TELEPHONE ENCOUNTER
Patient was told during her last visit with Dr. Barajas that if she was to become pregnant to notify him as she may need to up her Synthroid dosage. She believes she may be pregnant after getting a positive result at her at home pregnancy test. She still needs to confirm this and has an appt with her doctor on 1/20/21.   She did take an additional pill today since he told her to take double dosage if she did become pregnant.   [phone]

## 2021-01-20 ENCOUNTER — GYNECOLOGY VISIT (OUTPATIENT)
Dept: OBGYN | Facility: CLINIC | Age: 31
End: 2021-01-20
Payer: COMMERCIAL

## 2021-01-20 VITALS
BODY MASS INDEX: 24.75 KG/M2 | DIASTOLIC BLOOD PRESSURE: 73 MMHG | WEIGHT: 154 LBS | HEIGHT: 66 IN | SYSTOLIC BLOOD PRESSURE: 128 MMHG

## 2021-01-20 DIAGNOSIS — Z34.90 PREGNANCY, UNSPECIFIED GESTATIONAL AGE: ICD-10-CM

## 2021-01-20 DIAGNOSIS — N83.201 RIGHT OVARIAN CYST: ICD-10-CM

## 2021-01-20 DIAGNOSIS — N91.2 AMENORRHEA: ICD-10-CM

## 2021-01-20 LAB
INT CON NEG: NEGATIVE
INT CON POS: POSITIVE
POC URINE PREGNANCY TEST: POSITIVE

## 2021-01-20 PROCEDURE — 81025 URINE PREGNANCY TEST: CPT | Performed by: OBSTETRICS & GYNECOLOGY

## 2021-01-20 PROCEDURE — 76830 TRANSVAGINAL US NON-OB: CPT | Performed by: OBSTETRICS & GYNECOLOGY

## 2021-01-20 PROCEDURE — 99203 OFFICE O/P NEW LOW 30 MIN: CPT | Mod: 25 | Performed by: OBSTETRICS & GYNECOLOGY

## 2021-01-20 ASSESSMENT — FIBROSIS 4 INDEX: FIB4 SCORE: 0.62

## 2021-01-20 NOTE — NON-PROVIDER
"  LMP:11/19/2020  UPT positive, done in clinic.  Good phone #:[phone]  Pharmacy verified.  Pt states no other complaints for today.  Pt states having morning sickness for almost qd.  Pt states \"  and planned pregnancy. FOB is involved and supportive.\"    "

## 2021-01-20 NOTE — PROGRESS NOTES
CC: Amenorrhea    Osito Lange,  30 y.o. No obstetric history on file. female with No LMP recorded. presents today with complaint of dysfunctional uterine bleeding.    Subjective : Patient presents to the office for absent menses.    Nausea/Vomiting: Yes    Abdominal /pelvic cramping: No  Vaginal bleeding: No  Positive home UPT 12/12/2020  Partner Oleg  Other symptoms: denies    Pertinent positives documented in HPI and all other systems reviewed & are negative    OB History   No obstetric history on file.       Past Gyn history: last pap 2015, hx STDs denies    Past Medical History:   Diagnosis Date   • Thyroid disease        No past surgical history on file.    Meds: PNV, synthroid, cytomel    Allergies: Penicillins    Physical Exam:    There were no vitals taken for this visit.  Gen: well-appearing, well-hydrated, well-nourished  Abd: abdomen is soft without significant tenderness, masses, organomegaly or guarding  Pelvic: External genitalia normal, Urethra without abnormality or discharge, no CMT  Ext: NT bilaterally, no cyanosis, clubbing or edema    No results found for this or any previous visit (from the past 336 hour(s)).    Transvaginal US performed and per my read:    Indication: Amenorrhea    Findings: short intrauterine pregnancy @ 8+0 by CRL.   Positive gestational sac.  Positive yolk sac.   Positive fetal cardiac activity @ 176 BPM.   Right ovary 3cm complex ovarian cyst. Left Ovary wnl. Cervical length has fluid inside, length is 2.8cm.   No free fluid in the cul-de-sac.  LMP = 8+6, more than 5 days from US measurement    Impression: viable IUP @ 8+0. EDC 9/1/2021 by 8 wk US      Assessment:  30 y.o. with amenorrhea   Pregnancy exam/test positive  G1 @ 8+0. EDC by 8 wk US    Plan:  3 weeks for new OB appt  Normal pregnancy symptoms discussed  SAB/labor precautions educated  Call office w/ questions or concerns

## 2021-01-28 ENCOUNTER — HOSPITAL ENCOUNTER (OUTPATIENT)
Dept: LAB | Facility: MEDICAL CENTER | Age: 31
End: 2021-01-28
Attending: INTERNAL MEDICINE
Payer: COMMERCIAL

## 2021-01-28 DIAGNOSIS — E06.3 HYPOTHYROIDISM, ACQUIRED, AUTOIMMUNE: ICD-10-CM

## 2021-01-28 DIAGNOSIS — E53.8 VITAMIN B12 DEFICIENCY: ICD-10-CM

## 2021-01-28 DIAGNOSIS — E55.9 VITAMIN D DEFICIENCY: ICD-10-CM

## 2021-01-28 LAB
25(OH)D3 SERPL-MCNC: 42 NG/ML (ref 30–100)
T3 SERPL-MCNC: 110 NG/DL (ref 60–181)
T3FREE SERPL-MCNC: 2.76 PG/ML (ref 2–4.4)
T4 FREE SERPL-MCNC: 1.4 NG/DL (ref 0.93–1.7)
TSH SERPL DL<=0.005 MIU/L-ACNC: 0.69 UIU/ML (ref 0.38–5.33)
VIT B12 SERPL-MCNC: 764 PG/ML (ref 211–911)

## 2021-01-28 PROCEDURE — 84443 ASSAY THYROID STIM HORMONE: CPT

## 2021-01-28 PROCEDURE — 84480 ASSAY TRIIODOTHYRONINE (T3): CPT

## 2021-01-28 PROCEDURE — 84439 ASSAY OF FREE THYROXINE: CPT

## 2021-01-28 PROCEDURE — 82607 VITAMIN B-12: CPT

## 2021-01-28 PROCEDURE — 36415 COLL VENOUS BLD VENIPUNCTURE: CPT

## 2021-01-28 PROCEDURE — 84481 FREE ASSAY (FT-3): CPT

## 2021-01-28 PROCEDURE — 82306 VITAMIN D 25 HYDROXY: CPT

## 2021-02-03 ENCOUNTER — TELEPHONE (OUTPATIENT)
Dept: ENDOCRINOLOGY | Facility: MEDICAL CENTER | Age: 31
End: 2021-02-03

## 2021-02-03 ENCOUNTER — TELEMEDICINE (OUTPATIENT)
Dept: ENDOCRINOLOGY | Facility: MEDICAL CENTER | Age: 31
End: 2021-02-03
Attending: INTERNAL MEDICINE
Payer: COMMERCIAL

## 2021-02-03 VITALS — WEIGHT: 155 LBS | BODY MASS INDEX: 25.02 KG/M2

## 2021-02-03 DIAGNOSIS — E03.8 HYPOTHYROIDISM DUE TO HASHIMOTO'S THYROIDITIS: ICD-10-CM

## 2021-02-03 DIAGNOSIS — E53.8 VITAMIN B 12 DEFICIENCY: ICD-10-CM

## 2021-02-03 DIAGNOSIS — E55.9 VITAMIN D DEFICIENCY: ICD-10-CM

## 2021-02-03 DIAGNOSIS — E06.3 HYPOTHYROIDISM DUE TO HASHIMOTO'S THYROIDITIS: ICD-10-CM

## 2021-02-03 PROCEDURE — 99214 OFFICE O/P EST MOD 30 MIN: CPT | Mod: 95,CR | Performed by: NURSE PRACTITIONER

## 2021-02-03 RX ORDER — LEVOTHYROXINE SODIUM 112 UG/1
112 TABLET ORAL
Qty: 114 TAB | Refills: 2 | Status: SHIPPED | OUTPATIENT
Start: 2021-02-03 | End: 2021-07-12

## 2021-02-03 ASSESSMENT — PATIENT HEALTH QUESTIONNAIRE - PHQ9: CLINICAL INTERPRETATION OF PHQ2 SCORE: 0

## 2021-02-03 ASSESSMENT — FIBROSIS 4 INDEX: FIB4 SCORE: 0.62

## 2021-02-03 NOTE — PROGRESS NOTES
Chief Complaint: Follow up for Hashimoto's Hypothyroidism, Vitamin D Deficiency & Vitamin B Deficiency.  Previously seen by Dr. Barajas.  Patient was presented for a telehealth consultation via secure and encrypted videoconferencing technology. This encounter was conducted via Zoom . Verbal consent was obtained. Patient's identity was verified.    HPI:     Osito Lange is a 30 y.o. female for continued evaluation & treatment of the following:     Pt is currently 10 weeks pregnant.    1. Hashimoto's Hypothyroidism    Liothyronine 5 mcg per day and Levothyroxine 112 mcg 5 days per week and 224mcg 2 days per week. This was at the recommendation of Dr. Barajas, if patient were to become pregnant then her thyroid hormone would need to be increased by 30%.     Ref. Range 1/28/2021 11:32   TSH Latest Ref Range: 0.380 - 5.330 uIU/mL 0.685   Free T-4 Latest Ref Range: 0.93 - 1.70 ng/dL 1.40   T3 Latest Ref Range: 60.0 - 181.0 ng/dL 110.0   T3,Free Latest Ref Range: 2.00 - 4.40 pg/mL 2.76      Since last visit patient reports feeling well.    Pt reports good compliance and denies missing any daily doses.     Pt takes thyroid hormone before breakfast.     Pt denies taking any iron, calcium supplements or antacids.      Weight 155lbs, unchanged from previous appointment.     Hx Nodule on right side neck since she was a teenager.   Has never had an US of the Neck.   Denies tenderness or any new nodules/lumps.     2. Vitamin D Deficiency  Currently on 2000 IU per day.     Ref. Range 1/28/2021 11:32   25-Hydroxy   Vitamin D 25 Latest Ref Range: 30 - 100 ng/mL 42       3. Vitamin B Deficiency  Currently prenatal vitamin with Folic Acid and Vitamin B12.    Gluten free diet.    Ref. Range 1/28/2021 11:32   Vitamin B12 -True Cobalamin Latest Ref Range: 211 - 911 pg/mL 764           Patient's medications, allergies, and social histories were reviewed and updated as appropriate.      ROS:     CONS:     No fever, no chills    EYES:     No diplopia, no blurry vision   CV:           No chest pain, no palpitations   PULM:     No SOB, no cough, no hemoptysis.   GI:            No nausea, no vomiting, no diarrhea, no constipation   ENDO:     No polyuria, no polydipsia, no heat intolerance, no cold intolerance       Past Medical History:  Problem List:  2020-01: Lip swelling  2020-01: Skin irritation  2019-09: Hypothyroidism, acquired, autoimmune  2018-11: Encounter for other contraceptive management  2018-11: Annual physical exam  2017-09: Hashimoto's thyroiditis  2017-09: Vitamin D deficiency      Past Surgical History:  No past surgical history on file.     Allergies:  Penicillins     Social History:  Social History     Tobacco Use   • Smoking status: Never Smoker   • Smokeless tobacco: Never Used   Substance Use Topics   • Alcohol use: Not Currently     Comment: rarely   • Drug use: No        Family History:   family history is not on file.      PHYSICAL EXAM:   Vital signs: Virtual Visit.   GENERAL: Well-developed, well-nourished in no apparent distress.   EYE:  No ocular asymmetry, PERRLA  HENT: Pink, moist mucous membranes.    NECK: No thyromegaly.   CARDIOVASCULAR:  No murmurs  LUNGS: Clear breath sounds  ABDOMEN: Soft, nontender   EXTREMITIES: No clubbing, cyanosis, or edema.   NEUROLOGICAL: No gross focal motor abnormalities   LYMPH: No cervical adenopathy seen.   SKIN: No rashes, lesions.     ASSESSMENT/PLAN:     1. Hypothyroidism due to Hashimoto's thyroiditis  Stable  Continue taking Liothyronine 5 mcg per day and Levothyroxine 112 mcg 5 days per week and 224mcg 2 days per week.     2. Vitamin D deficiency  Stable  Continue taking     3. Vitamin B 12 deficiency  Stable  Continue taking prenatal vitamin with folic acid and B supplementation.     Repeat labs 1 week prior to follow up appointment.   Follow up appointment in 2 months.     Thank you kindly for allowing me to participate in the thyroid care plan for this  patient.    Niesha Meza, APRN  02/03/21    CC:   Dm Irvin P.A.-C.

## 2021-02-17 ENCOUNTER — INITIAL PRENATAL (OUTPATIENT)
Dept: OBGYN | Facility: CLINIC | Age: 31
End: 2021-02-17
Payer: COMMERCIAL

## 2021-02-17 ENCOUNTER — HOSPITAL ENCOUNTER (OUTPATIENT)
Facility: MEDICAL CENTER | Age: 31
End: 2021-02-17
Attending: OBSTETRICS & GYNECOLOGY
Payer: COMMERCIAL

## 2021-02-17 VITALS — BODY MASS INDEX: 25.02 KG/M2 | SYSTOLIC BLOOD PRESSURE: 123 MMHG | WEIGHT: 155 LBS | DIASTOLIC BLOOD PRESSURE: 76 MMHG

## 2021-02-17 DIAGNOSIS — Z34.01 ENCOUNTER FOR SUPERVISION OF NORMAL FIRST PREGNANCY IN FIRST TRIMESTER: ICD-10-CM

## 2021-02-17 PROCEDURE — 87591 N.GONORRHOEAE DNA AMP PROB: CPT

## 2021-02-17 PROCEDURE — 59401 PR NEW OB VISIT: CPT | Performed by: OBSTETRICS & GYNECOLOGY

## 2021-02-17 PROCEDURE — 87491 CHLMYD TRACH DNA AMP PROBE: CPT

## 2021-02-17 PROCEDURE — 88175 CYTOPATH C/V AUTO FLUID REDO: CPT

## 2021-02-17 ASSESSMENT — EDINBURGH POSTNATAL DEPRESSION SCALE (EPDS)
I HAVE BLAMED MYSELF UNNECESSARILY WHEN THINGS WENT WRONG: NO, NEVER
I HAVE FELT SAD OR MISERABLE: NO, NOT AT ALL
THINGS HAVE BEEN GETTING ON TOP OF ME: NO, MOST OF THE TIME I HAVE COPED QUITE WELL
I HAVE LOOKED FORWARD WITH ENJOYMENT TO THINGS: AS MUCH AS I EVER DID
I HAVE BEEN SO UNHAPPY THAT I HAVE BEEN CRYING: NO, NEVER
I HAVE BEEN SO UNHAPPY THAT I HAVE HAD DIFFICULTY SLEEPING: NOT VERY OFTEN
I HAVE BEEN ANXIOUS OR WORRIED FOR NO GOOD REASON: NO, NOT AT ALL
THE THOUGHT OF HARMING MYSELF HAS OCCURRED TO ME: NEVER
I HAVE FELT SCARED OR PANICKY FOR NO GOOD REASON: NO, NOT AT ALL
TOTAL SCORE: 2
I HAVE BEEN ABLE TO LAUGH AND SEE THE FUNNY SIDE OF THINGS: AS MUCH AS I ALWAYS COULD

## 2021-02-17 ASSESSMENT — FIBROSIS 4 INDEX: FIB4 SCORE: 0.62

## 2021-02-17 NOTE — PROGRESS NOTES
Pt. Here for NOB visit today.  # [phone]  First prenatal care  Pt. States no concerns   Pharmacy verified  Chaperone offered and provided

## 2021-02-17 NOTE — PROGRESS NOTES
Cc: New OB visit    HPI:  The patient is a 30 y.o.  12w0d by 8wk .    She presents for her new obstetric visit.  Currently feels well, no concerns.      denies personal or FOB family history of genetic abnormalities or mental retardation. Reports an aunt on her Mom's side had hx of cardiac abnormality at birth; required surgical repair when she was a kid.  reports personal history of chickenpox   Reports no cats at home.      ROS:  gen: denies general concerns  CV/resp: denies history of cardiac/respiratory issues.  abd: Vomiting x1 only.  Denies other concerns.  Denies abd pain.  GYN: Denies vaginal bleeding        OB History    Para Term  AB Living   1             SAB TAB Ectopic Molar Multiple Live Births                    # Outcome Date GA Lbr Neal/2nd Weight Sex Delivery Anes PTL Lv   1 Current                GYNHx:  Denies hx of STIs  Denies hx of abnormal paps, last several years ago    Past Medical History:   Diagnosis Date   • Asthma    • Hypotension    • Thyroid disease        History reviewed. No pertinent surgical history.  Current Outpatient Medications on File Prior to Visit   Medication Sig Dispense Refill   • levothyroxine (SYNTHROID) 112 MCG Tab Take 1 Tab by mouth Every morning on an empty stomach. Synthroid brand medically necessary.  Taking 112 mcg 7 days a week and 224 mcg 2 days a week 114 Tab 2   • Prenatal MV-Min-Fe Fum-FA-DHA (PRENATAL 1 PO) Take  by mouth.     • liothyronine (CYTOMEL) 5 MCG Tab Take 1 Tab by mouth every day. 96 Tab 3     No current facility-administered medications on file prior to visit.       Allergies:   Allergies as of 2021 - Reviewed 2021   Allergen Reaction Noted   • Penicillins Hives 2014     History reviewed. No pertinent family history.     Social History     Tobacco Use   • Smoking status: Never Smoker   • Smokeless tobacco: Never Used   Substance Use Topics   • Alcohol use: Not Currently     Comment: rarely   • Drug use: No            PE:    /76   Wt 70.3 kg (155 lb)   Gen: AAO, NAD  Abd: soft, NT, FHTs 150 by Doppler  : NEFG, normal vagina and cervix  Ext: NT, no edema        A/P:  30 y.o.  12w0d by 8-week ultrasound here for new OB visit  1. Encounter for supervision of normal first pregnancy in first trimester  URINE DRUG SCREEN    URINE CULTURE(NEW)    HEP C VIRUS ANTIBODY    THINPREP RFLX HPV ASCUS W/CTNG    PRENATAL PANEL 3+HIV+UACXI    TSH    Cystic Fibrosis(CFTR)165 Pathogenic Variants    SPINAL MUSCULAR ATROPHY COPY NUMBER ANALYSIS      - NOB labs rx given  - discussed carrier screening, offered CF/SMA carrier screening; also discussed expanded carrier screening; patient desires CF/SMA.  Advised to check with insurance for coverage prior to lab draw.  - discussed aneuploidy screening vs diagnostic testing, discussed quad screen versus non-invasive prenatal test.  Pt desires quad screen, will give order at next visit    - discussed hospitalist coverage of L&D coverage/shared OB care model involving physicians and midwives on labor and delivery.    - hypothyroidism: follows with endocrinology; discussed need for q4-6wk labs; recent TSH at goal 2 weeks ago.  Will repeat at the time of her next visit with quad screen.      F/U 4wks    Nany Quijano MD  [facility], Women's Health

## 2021-02-19 LAB
C TRACH DNA GENITAL QL NAA+PROBE: NEGATIVE
CYTOLOGY REG CYTOL: NORMAL
N GONORRHOEA DNA GENITAL QL NAA+PROBE: NEGATIVE
SPECIMEN SOURCE: NORMAL

## 2021-02-24 ENCOUNTER — TELEPHONE (OUTPATIENT)
Dept: OBGYN | Facility: CLINIC | Age: 31
End: 2021-02-24

## 2021-02-24 DIAGNOSIS — E06.3 HASHIMOTO'S THYROIDITIS: ICD-10-CM

## 2021-02-25 NOTE — TELEPHONE ENCOUNTER
Pt called triage line stating she was given a referral to [facility] for an ovarian cyst they didn't find it or tried to find it and her insurance company didn't cover the visit she had to pay $ 400.00, she was wondering If she could continue care with us. Called pt to notify her that we will continue to be her OB/GYN doctor's and she will have follow up appt.s with us, referral was to evaluate her ovarian cyst, gestational age and cervical length. Pt verbalized understanding.

## 2021-03-12 ENCOUNTER — TELEPHONE (OUTPATIENT)
Dept: OBGYN | Facility: CLINIC | Age: 31
End: 2021-03-12

## 2021-03-12 ENCOUNTER — HOSPITAL ENCOUNTER (OUTPATIENT)
Dept: LAB | Facility: MEDICAL CENTER | Age: 31
End: 2021-03-12
Attending: NURSE PRACTITIONER
Payer: COMMERCIAL

## 2021-03-12 ENCOUNTER — HOSPITAL ENCOUNTER (OUTPATIENT)
Dept: LAB | Facility: MEDICAL CENTER | Age: 31
End: 2021-03-12
Attending: OBSTETRICS & GYNECOLOGY
Payer: COMMERCIAL

## 2021-03-12 DIAGNOSIS — Z34.01 ENCOUNTER FOR SUPERVISION OF NORMAL FIRST PREGNANCY IN FIRST TRIMESTER: ICD-10-CM

## 2021-03-12 DIAGNOSIS — E06.3 HASHIMOTO'S THYROIDITIS: ICD-10-CM

## 2021-03-12 LAB
ABO GROUP BLD: NORMAL
AMPHET UR QL SCN: NEGATIVE
BARBITURATES UR QL SCN: NEGATIVE
BENZODIAZ UR QL SCN: NEGATIVE
BLD GP AB SCN SERPL QL: NORMAL
BZE UR QL SCN: NEGATIVE
CANNABINOIDS UR QL SCN: NEGATIVE
METHADONE UR QL SCN: NEGATIVE
OPIATES UR QL SCN: NEGATIVE
OXYCODONE UR QL SCN: NEGATIVE
PCP UR QL SCN: NEGATIVE
PROPOXYPH UR QL SCN: NEGATIVE
RH BLD: NORMAL
T4 FREE SERPL-MCNC: 1.17 NG/DL (ref 0.93–1.7)
TSH SERPL DL<=0.005 MIU/L-ACNC: 0.72 UIU/ML (ref 0.38–5.33)

## 2021-03-12 PROCEDURE — 81003 URINALYSIS AUTO W/O SCOPE: CPT

## 2021-03-12 PROCEDURE — 86803 HEPATITIS C AB TEST: CPT

## 2021-03-12 PROCEDURE — 87086 URINE CULTURE/COLONY COUNT: CPT

## 2021-03-12 PROCEDURE — 86901 BLOOD TYPING SEROLOGIC RH(D): CPT

## 2021-03-12 PROCEDURE — 80307 DRUG TEST PRSMV CHEM ANLYZR: CPT

## 2021-03-12 PROCEDURE — 84443 ASSAY THYROID STIM HORMONE: CPT

## 2021-03-12 PROCEDURE — 86762 RUBELLA ANTIBODY: CPT

## 2021-03-12 PROCEDURE — 84481 FREE ASSAY (FT-3): CPT

## 2021-03-12 PROCEDURE — 86900 BLOOD TYPING SEROLOGIC ABO: CPT

## 2021-03-12 PROCEDURE — 84439 ASSAY OF FREE THYROXINE: CPT

## 2021-03-12 PROCEDURE — 86780 TREPONEMA PALLIDUM: CPT

## 2021-03-12 PROCEDURE — 86850 RBC ANTIBODY SCREEN: CPT

## 2021-03-12 PROCEDURE — 87340 HEPATITIS B SURFACE AG IA: CPT

## 2021-03-12 PROCEDURE — 87389 HIV-1 AG W/HIV-1&-2 AB AG IA: CPT

## 2021-03-12 PROCEDURE — 85025 COMPLETE CBC W/AUTO DIFF WBC: CPT

## 2021-03-12 PROCEDURE — 36415 COLL VENOUS BLD VENIPUNCTURE: CPT

## 2021-03-12 NOTE — TELEPHONE ENCOUNTER
Pt called triage line wanting to know if her Thyroid testing could be cancelled because she is getting It done through another provider and she has decided she does not want to do cystic fibrosis testing or Spinal muscular testing do they need to be cancelled. Called pt and notified her that when she goes to the lab and presents her blood test order the lab will let her know if there is duplicate order and in regards to her changing her mind about genetic testing is her choice the labs will be there as a standing order pt verbalized understanding.

## 2021-03-13 LAB
APPEARANCE UR: CLEAR
BASOPHILS # BLD AUTO: 0.2 % (ref 0–1.8)
BASOPHILS # BLD: 0.02 K/UL (ref 0–0.12)
BILIRUB UR QL STRIP.AUTO: NEGATIVE
COLOR UR: YELLOW
EOSINOPHIL # BLD AUTO: 0.04 K/UL (ref 0–0.51)
EOSINOPHIL NFR BLD: 0.4 % (ref 0–6.9)
ERYTHROCYTE [DISTWIDTH] IN BLOOD BY AUTOMATED COUNT: 44.1 FL (ref 35.9–50)
GLUCOSE UR STRIP.AUTO-MCNC: NEGATIVE MG/DL
HBV SURFACE AG SER QL: ABNORMAL
HCT VFR BLD AUTO: 36.5 % (ref 37–47)
HCV AB SER QL: NORMAL
HGB BLD-MCNC: 12.2 G/DL (ref 12–16)
HIV 1+2 AB+HIV1 P24 AG SERPL QL IA: NORMAL
IMM GRANULOCYTES # BLD AUTO: 0.06 K/UL (ref 0–0.11)
IMM GRANULOCYTES NFR BLD AUTO: 0.6 % (ref 0–0.9)
KETONES UR STRIP.AUTO-MCNC: NEGATIVE MG/DL
LEUKOCYTE ESTERASE UR QL STRIP.AUTO: NEGATIVE
LYMPHOCYTES # BLD AUTO: 1.62 K/UL (ref 1–4.8)
LYMPHOCYTES NFR BLD: 14.9 % (ref 22–41)
MCH RBC QN AUTO: 31.5 PG (ref 27–33)
MCHC RBC AUTO-ENTMCNC: 33.4 G/DL (ref 33.6–35)
MCV RBC AUTO: 94.3 FL (ref 81.4–97.8)
MICRO URNS: ABNORMAL
MONOCYTES # BLD AUTO: 0.55 K/UL (ref 0–0.85)
MONOCYTES NFR BLD AUTO: 5.1 % (ref 0–13.4)
NEUTROPHILS # BLD AUTO: 8.6 K/UL (ref 2–7.15)
NEUTROPHILS NFR BLD: 78.8 % (ref 44–72)
NITRITE UR QL STRIP.AUTO: NEGATIVE
NRBC # BLD AUTO: 0 K/UL
NRBC BLD-RTO: 0 /100 WBC
PH UR STRIP.AUTO: 6 [PH] (ref 5–8)
PLATELET # BLD AUTO: 233 K/UL (ref 164–446)
PMV BLD AUTO: 9 FL (ref 9–12.9)
PROT UR QL STRIP: NEGATIVE MG/DL
RBC # BLD AUTO: 3.87 M/UL (ref 4.2–5.4)
RBC UR QL AUTO: NEGATIVE
RUBV AB SER QL: 64.9 IU/ML
SP GR UR STRIP.AUTO: 1.02
T3FREE SERPL-MCNC: 2.87 PG/ML (ref 2–4.4)
TREPONEMA PALLIDUM IGG+IGM AB [PRESENCE] IN SERUM OR PLASMA BY IMMUNOASSAY: ABNORMAL
WBC # BLD AUTO: 10.9 K/UL (ref 4.8–10.8)

## 2021-03-14 LAB
BACTERIA UR CULT: NORMAL
SIGNIFICANT IND 70042: NORMAL
SITE SITE: NORMAL
SOURCE SOURCE: NORMAL

## 2021-03-17 ENCOUNTER — ROUTINE PRENATAL (OUTPATIENT)
Dept: OBGYN | Facility: CLINIC | Age: 31
End: 2021-03-17
Payer: COMMERCIAL

## 2021-03-17 VITALS — SYSTOLIC BLOOD PRESSURE: 124 MMHG | DIASTOLIC BLOOD PRESSURE: 72 MMHG | WEIGHT: 158 LBS | BODY MASS INDEX: 25.5 KG/M2

## 2021-03-17 DIAGNOSIS — E06.3 HYPOTHYROIDISM, ACQUIRED, AUTOIMMUNE: ICD-10-CM

## 2021-03-17 DIAGNOSIS — Z34.02 ENCOUNTER FOR SUPERVISION OF NORMAL FIRST PREGNANCY IN SECOND TRIMESTER: ICD-10-CM

## 2021-03-17 PROCEDURE — 90040 PR PRENATAL FOLLOW UP: CPT | Performed by: OBSTETRICS & GYNECOLOGY

## 2021-03-17 ASSESSMENT — FIBROSIS 4 INDEX: FIB4 SCORE: 0.64

## 2021-03-17 NOTE — PROGRESS NOTES
OB follow up   + fetal movement.  No VB, LOF or UC's  Phone # [phone]  Preferred pharmacy confirmed  Pt declines all genetic testing, consent signed   Pt reports no problems   US ordered

## 2021-03-17 NOTE — PROGRESS NOTES
S: Pt presents for routine OB follow up.  Patient is doing well currently without any pregnancies concerns.  Denies headaches.  Reports normal bowel and bladder functions currently  No contractions, vaginal bleeding, or leaking fluids.  Reports good fetal movement.  Patient has hypothyroidism and is on Synthroid and is managed by endocrinologist.  Patient states she saw perinatology group with had a bad experience and does not want to follow-up -she does not want to do genetic screening and testing at this point .anatomy ultrasound was ordered today at radiology and patient may consider genetic testing if there is any abnormalities noted on anatomy ultrasound.  Questions answered.    O: /72   Wt 71.7 kg (158 lb)   BMI 25.50 kg/m²   Patients' weight gain, fluid intake and exercise level discussed.  Vitals, fundal height , fetal position, and FHR reviewed on flowsheet    Lab:  Recent Results (from the past 336 hour(s))   PRENATAL PANEL 3+HIV+UACXI    Collection Time: 03/12/21 11:51 AM   Result Value Ref Range    Color Yellow     Character Clear     Specific Gravity 1.020 <1.035    Ph 6.0 5.0 - 8.0    Glucose Negative Negative mg/dL    Ketones Negative Negative mg/dL    Protein Negative Negative mg/dL    Bilirubin Negative Negative    Nitrite Negative Negative    Leukocyte Esterase Negative Negative    Occult Blood Negative Negative    Micro Urine Req see below     WBC 10.9 (H) 4.8 - 10.8 K/uL    RBC 3.87 (L) 4.20 - 5.40 M/uL    Hemoglobin 12.2 12.0 - 16.0 g/dL    Hematocrit 36.5 (L) 37.0 - 47.0 %    MCV 94.3 81.4 - 97.8 fL    MCH 31.5 27.0 - 33.0 pg    MCHC 33.4 (L) 33.6 - 35.0 g/dL    RDW 44.1 35.9 - 50.0 fL    Platelet Count 233 164 - 446 K/uL    MPV 9.0 9.0 - 12.9 fL    Neutrophils-Polys 78.80 (H) 44.00 - 72.00 %    Lymphocytes 14.90 (L) 22.00 - 41.00 %    Monocytes 5.10 0.00 - 13.40 %    Eosinophils 0.40 0.00 - 6.90 %    Basophils 0.20 0.00 - 1.80 %    Immature Granulocytes 0.60 0.00 - 0.90 %    Nucleated  RBC 0.00 /100 WBC    Neutrophils (Absolute) 8.60 (H) 2.00 - 7.15 K/uL    Lymphs (Absolute) 1.62 1.00 - 4.80 K/uL    Monos (Absolute) 0.55 0.00 - 0.85 K/uL    Eos (Absolute) 0.04 0.00 - 0.51 K/uL    Baso (Absolute) 0.02 0.00 - 0.12 K/uL    Immature Granulocytes (abs) 0.06 0.00 - 0.11 K/uL    NRBC (Absolute) 0.00 K/uL    Rubella IgG Antibody 64.90 IU/mL    Hepatitis B Surface Antigen Non-Reactive Non-Reactive    Syphilis, Treponemal Qual Non-Reactive Non-Reactive   HEP C VIRUS ANTIBODY    Collection Time: 21 11:51 AM   Result Value Ref Range    Hepatitis C Antibody Non-Reactive Non-Reactive   URINE CULTURE(NEW)    Collection Time: 21 11:51 AM    Specimen: Urine   Result Value Ref Range    Significant Indicator NEG     Source UR     Site -     Culture Result No growth at 48 hours.    URINE DRUG SCREEN    Collection Time: 21 11:51 AM   Result Value Ref Range    Amphetamines Urine Negative Negative    Barbiturates Negative Negative    Benzodiazepines Negative Negative    Cocaine Metabolite Negative Negative    Methadone Negative Negative    Opiates Negative Negative    Oxycodone Negative Negative    Phencyclidine -Pcp Negative Negative    Propoxyphene Negative Negative    Cannabinoid Metab Negative Negative   HIV AG/AB COMBO ASSAY SCREENING    Collection Time: 21 11:51 AM   Result Value Ref Range    HIV Ag/Ab Combo Assay Non-Reactive Non Reactive   OP Prenatal Panel-Blood Bank    Collection Time: 21 11:51 AM   Result Value Ref Range    ABO Grouping Only O     Rh Grouping Only POS     Antibody Screen Scrn NEG    FREE THYROXINE    Collection Time: 21 11:52 AM   Result Value Ref Range    Free T-4 1.17 0.93 - 1.70 ng/dL   T3 FREE    Collection Time: 21 11:52 AM   Result Value Ref Range    T3,Free 2.87 2.00 - 4.40 pg/mL   TSH    Collection Time: 21 11:52 AM   Result Value Ref Range    TSH 0.724 0.380 - 5.330 uIU/mL       A/P:  31 y.o.  at 16w0d presents for routine obstetric  follow-up.  Doing well  Size equals dates.  Prenatal labs are all within normal limits.  Patient declined genetic testing at this point but may possibly consider in the future depending on anatomy ultrasound.  Hypothyroidism currently on Synthroid and managed by endocrinologist.  Requisition for anatomy ultrasound given today  Encounter Diagnoses   Name Primary?   • Encounter for supervision of normal first pregnancy in second trimester    • Hypothyroidism, acquired, autoimmune          1.  Continue prenatal vitamins.  2.  Begin kick counts at 28 weeks.  3.  Exercise at least 30 minutes daily.  4.  Drink at least 2 Liters of water daily  5.  Follow-up in 4 weeks.  6.  Pregnancy precautions and plan of care were discussed

## 2021-03-31 ENCOUNTER — OFFICE VISIT (OUTPATIENT)
Dept: ENDOCRINOLOGY | Facility: MEDICAL CENTER | Age: 31
End: 2021-03-31
Attending: NURSE PRACTITIONER
Payer: COMMERCIAL

## 2021-03-31 VITALS
WEIGHT: 160 LBS | BODY MASS INDEX: 25.71 KG/M2 | DIASTOLIC BLOOD PRESSURE: 72 MMHG | SYSTOLIC BLOOD PRESSURE: 126 MMHG | HEIGHT: 66 IN

## 2021-03-31 DIAGNOSIS — E06.3 HASHIMOTO'S THYROIDITIS: ICD-10-CM

## 2021-03-31 PROCEDURE — 99211 OFF/OP EST MAY X REQ PHY/QHP: CPT | Performed by: NURSE PRACTITIONER

## 2021-03-31 PROCEDURE — 99214 OFFICE O/P EST MOD 30 MIN: CPT | Performed by: NURSE PRACTITIONER

## 2021-03-31 ASSESSMENT — FIBROSIS 4 INDEX: FIB4 SCORE: 0.64

## 2021-03-31 NOTE — PROGRESS NOTES
Chief Complaint: Follow up for Hashimoto's Hypothyroidism, Vitamin D Deficiency & Vitamin B Deficiency.  Previously seen by Dr. Barajas.  Patient was presented for a telehealth consultation via secure and encrypted videoconferencing technology. This encounter was conducted via Zoom . Verbal consent was obtained. Patient's identity was verified.    HPI:     Osito Lange is a 30 y.o. female for continued evaluation & treatment of the following:     Pt is currently 19 weeks pregnant.    1. Hashimoto's Hypothyroidism    Liothyronine 5 mcg per day and Levothyroxine 112 mcg 5 days per week and 224mcg 2 days per week. This was at the recommendation of Dr. Barajas, if patient were to become pregnant then her thyroid hormone would need to be increased by 30%.    Since last visit patient reports feeling well, although a little fatigued.  Patient trying to discern if this is thyroid related versus pregnancy.  Pt reports good compliance and denies missing any daily doses.     Pt takes thyroid hormone before breakfast.     Pt denies taking any iron, calcium supplements or antacids.    Patient experiencing blurred vision at times, and increased constipation.  Patient states she tends to feel better when her TSH is suppressed and her free T4 is a little elevated.    Weight increased 5 pounds since prior appointment.    Hx Nodule on right side neck since she was a teenager.   Has never had an US of the Neck.   Denies tenderness or any new nodules/lumps.      Ref. Range 3/12/2021 11:52   TSH Latest Ref Range: 0.380 - 5.330 uIU/mL 0.724   Free T-4 Latest Ref Range: 0.93 - 1.70 ng/dL 1.17   T3,Free Latest Ref Range: 2.00 - 4.40 pg/mL 2.87       2. Vitamin D Deficiency  Currently on 2000 IU per day.     Ref. Range 1/28/2021 11:32   25-Hydroxy   Vitamin D 25 Latest Ref Range: 30 - 100 ng/mL 42       3. Vitamin B Deficiency  Currently prenatal vitamin with Folic Acid and Vitamin B12.    Gluten free diet.    Ref. Range 1/28/2021  11:32   Vitamin B12 -True Cobalamin Latest Ref Range: 211 - 911 pg/mL 764       Patient's medications, allergies, and social histories were reviewed and updated as appropriate.      ROS:     CONS:     No fever, no chills   EYES:     No diplopia, no blurry vision   CV:           No chest pain, no palpitations   PULM:     No SOB, no cough, no hemoptysis.   GI:            No nausea, no vomiting, no diarrhea, no constipation   ENDO:     No polyuria, no polydipsia, no heat intolerance, no cold intolerance       Past Medical History:  Problem List:  2021-03: Encounter for supervision of normal first pregnancy in   second trimester  2020-01: Lip swelling  2020-01: Skin irritation  2019-09: Hypothyroidism, acquired, autoimmune  2018-11: Encounter for other contraceptive management  2018-11: Annual physical exam  2017-09: Hashimoto's thyroiditis  2017-09: Vitamin D deficiency      Past Surgical History:  History reviewed. No pertinent surgical history.     Allergies:  Penicillins     Social History:  Social History     Tobacco Use   • Smoking status: Never Smoker   • Smokeless tobacco: Never Used   Substance Use Topics   • Alcohol use: Not Currently     Comment: rarely   • Drug use: No        Family History:   family history is not on file.      PHYSICAL EXAM:   Vital signs: Virtual Visit.   GENERAL: Well-developed, well-nourished in no apparent distress.   EYE:  No ocular asymmetry, PERRLA  HENT: Pink, moist mucous membranes.    NECK: No thyromegaly.   CARDIOVASCULAR:  No murmurs  LUNGS: Clear breath sounds  ABDOMEN: Soft, nontender   EXTREMITIES: No clubbing, cyanosis, or edema.   NEUROLOGICAL: No gross focal motor abnormalities   LYMPH: No cervical adenopathy seen.   SKIN: No rashes, lesions.     ASSESSMENT/PLAN:     1. Hypothyroidism due to Hashimoto's thyroiditis  Stable  Continue taking Liothyronine 5 mcg per day.    Change levothyroxine 112 mcg for days per week, 224mcg 2 days per week and 1-1/2 pills 1 day a  week.    2. Vitamin D deficiency  Stable  Continue taking vitamin D D2 1000 IU daily.    3. Vitamin B 12 deficiency  Stable  Continue taking prenatal vitamin with folic acid and B supplementation.     Repeat labs 1 week prior to follow up appointment.   Follow up appointment in 6 weeks.     Thank you kindly for allowing me to participate in the thyroid care plan for this patient.    Niesha Meza, APRN  03/31/2021    CC:   Dm Irvin P.A.-C.

## 2021-04-13 ENCOUNTER — HOSPITAL ENCOUNTER (OUTPATIENT)
Dept: RADIOLOGY | Facility: MEDICAL CENTER | Age: 31
End: 2021-04-13
Attending: OBSTETRICS & GYNECOLOGY
Payer: COMMERCIAL

## 2021-04-13 DIAGNOSIS — Z34.02 ENCOUNTER FOR SUPERVISION OF NORMAL FIRST PREGNANCY IN SECOND TRIMESTER: ICD-10-CM

## 2021-04-13 PROCEDURE — 76805 OB US >/= 14 WKS SNGL FETUS: CPT

## 2021-04-15 ENCOUNTER — ROUTINE PRENATAL (OUTPATIENT)
Dept: OBGYN | Facility: CLINIC | Age: 31
End: 2021-04-15
Payer: COMMERCIAL

## 2021-04-15 VITALS — DIASTOLIC BLOOD PRESSURE: 62 MMHG | BODY MASS INDEX: 26.15 KG/M2 | WEIGHT: 162 LBS | SYSTOLIC BLOOD PRESSURE: 113 MMHG

## 2021-04-15 DIAGNOSIS — Z34.90 PREGNANCY, UNSPECIFIED GESTATIONAL AGE: ICD-10-CM

## 2021-04-15 PROCEDURE — 90040 PR PRENATAL FOLLOW UP: CPT | Performed by: OBSTETRICS & GYNECOLOGY

## 2021-04-15 ASSESSMENT — FIBROSIS 4 INDEX: FIB4 SCORE: 0.64

## 2021-04-15 NOTE — PROGRESS NOTES
S: Pt presents for routine OB follow up.  No contractions, vaginal bleeding, or leaking fluids. Good fetal movement.    Questions answered.    O: /62   Wt 73.5 kg (162 lb)   BMI 26.15 kg/m²   Patients' weight gain, fluid intake and exercise level discussed.  Vitals, fundal height , fetal position, and FHR reviewed on flowsheet    Lab:No results found for this or any previous visit (from the past 336 hour(s)).    A/P:  31 y.o.  at 20w1d presents for routine obstetric follow-up.  Size equals dates and/or scan    EDC 2021 by 8 wk US  PNL: Rh+/-, RI, wnl    Aneuploidy screening: Declined.  Declined CF and SMA  Saw [facility]-does not want to go back.  Anatomy ultrasound ordered at radiology, NT was negative    Hypothyroidism- managed by endocrinologist    Anatomy US: [ ]    Glucola/3rd tri labs: [ ]   Rhogam: [ ]     Tdap: [ ]  Flu vacccine [ ]     GBS [ ]     Hypothyroidism: on synthroid 112mcg, liothyronine 5mcg   Follows w/ endocrine, TSH approx q6wks:    TSH 0.685

## 2021-04-15 NOTE — PROGRESS NOTES
Pt here today for OB follow up  Pt states no complaints  Reports +FM  Good # [phone]  Pharmacy Confirmed.  Chaperone offered and provided.   U/S 4/13/21

## 2021-04-26 DIAGNOSIS — E06.3 HASHIMOTO'S THYROIDITIS: ICD-10-CM

## 2021-04-26 DIAGNOSIS — E06.3 HYPOTHYROIDISM, ACQUIRED, AUTOIMMUNE: ICD-10-CM

## 2021-04-26 RX ORDER — LIOTHYRONINE SODIUM 5 UG/1
5 TABLET ORAL
Qty: 96 TABLET | Refills: 3 | Status: SHIPPED | OUTPATIENT
Start: 2021-04-26 | End: 2021-08-10 | Stop reason: SDUPTHER

## 2021-05-07 ENCOUNTER — HOSPITAL ENCOUNTER (OUTPATIENT)
Dept: LAB | Facility: MEDICAL CENTER | Age: 31
End: 2021-05-07
Attending: NURSE PRACTITIONER
Payer: COMMERCIAL

## 2021-05-07 DIAGNOSIS — E06.3 HASHIMOTO'S THYROIDITIS: ICD-10-CM

## 2021-05-07 LAB
ALBUMIN SERPL BCP-MCNC: 3.8 G/DL (ref 3.2–4.9)
ALBUMIN/GLOB SERPL: 1.4 G/DL
ALP SERPL-CCNC: 64 U/L (ref 30–99)
ALT SERPL-CCNC: 13 U/L (ref 2–50)
ANION GAP SERPL CALC-SCNC: 11 MMOL/L (ref 7–16)
AST SERPL-CCNC: 15 U/L (ref 12–45)
BILIRUB SERPL-MCNC: 0.2 MG/DL (ref 0.1–1.5)
BUN SERPL-MCNC: 11 MG/DL (ref 8–22)
CALCIUM SERPL-MCNC: 8.9 MG/DL (ref 8.4–10.2)
CHLORIDE SERPL-SCNC: 103 MMOL/L (ref 96–112)
CO2 SERPL-SCNC: 23 MMOL/L (ref 20–33)
CREAT SERPL-MCNC: 0.59 MG/DL (ref 0.5–1.4)
FASTING STATUS PATIENT QL REPORTED: NORMAL
GLOBULIN SER CALC-MCNC: 2.8 G/DL (ref 1.9–3.5)
GLUCOSE SERPL-MCNC: 88 MG/DL (ref 65–99)
POTASSIUM SERPL-SCNC: 4.2 MMOL/L (ref 3.6–5.5)
PROT SERPL-MCNC: 6.6 G/DL (ref 6–8.2)
SODIUM SERPL-SCNC: 137 MMOL/L (ref 135–145)
T3FREE SERPL-MCNC: 2.13 PG/ML (ref 2–4.4)
T4 FREE SERPL-MCNC: 0.94 NG/DL (ref 0.93–1.7)
TSH SERPL DL<=0.005 MIU/L-ACNC: 0.99 UIU/ML (ref 0.38–5.33)

## 2021-05-07 PROCEDURE — 80053 COMPREHEN METABOLIC PANEL: CPT

## 2021-05-07 PROCEDURE — 84443 ASSAY THYROID STIM HORMONE: CPT

## 2021-05-07 PROCEDURE — 84439 ASSAY OF FREE THYROXINE: CPT

## 2021-05-07 PROCEDURE — 36415 COLL VENOUS BLD VENIPUNCTURE: CPT

## 2021-05-07 PROCEDURE — 84481 FREE ASSAY (FT-3): CPT

## 2021-05-11 ENCOUNTER — OFFICE VISIT (OUTPATIENT)
Dept: ENDOCRINOLOGY | Facility: MEDICAL CENTER | Age: 31
End: 2021-05-11
Attending: NURSE PRACTITIONER
Payer: COMMERCIAL

## 2021-05-11 VITALS
HEART RATE: 84 BPM | BODY MASS INDEX: 27.64 KG/M2 | SYSTOLIC BLOOD PRESSURE: 112 MMHG | OXYGEN SATURATION: 98 % | WEIGHT: 172 LBS | DIASTOLIC BLOOD PRESSURE: 70 MMHG | HEIGHT: 66 IN

## 2021-05-11 DIAGNOSIS — E55.9 VITAMIN D DEFICIENCY: ICD-10-CM

## 2021-05-11 DIAGNOSIS — E53.8 VITAMIN B 12 DEFICIENCY: ICD-10-CM

## 2021-05-11 DIAGNOSIS — E03.9 HYPOTHYROIDISM (ACQUIRED): ICD-10-CM

## 2021-05-11 DIAGNOSIS — Z3A.25 25 WEEKS GESTATION OF PREGNANCY: ICD-10-CM

## 2021-05-11 PROCEDURE — 99212 OFFICE O/P EST SF 10 MIN: CPT | Performed by: NURSE PRACTITIONER

## 2021-05-11 PROCEDURE — 99214 OFFICE O/P EST MOD 30 MIN: CPT | Performed by: NURSE PRACTITIONER

## 2021-05-11 ASSESSMENT — FIBROSIS 4 INDEX: FIB4 SCORE: 0.52

## 2021-05-13 ENCOUNTER — ROUTINE PRENATAL (OUTPATIENT)
Dept: OBGYN | Facility: CLINIC | Age: 31
End: 2021-05-13
Payer: COMMERCIAL

## 2021-05-13 VITALS — SYSTOLIC BLOOD PRESSURE: 107 MMHG | DIASTOLIC BLOOD PRESSURE: 62 MMHG | WEIGHT: 174 LBS | BODY MASS INDEX: 28.08 KG/M2

## 2021-05-13 DIAGNOSIS — Z34.90 PREGNANCY, UNSPECIFIED GESTATIONAL AGE: ICD-10-CM

## 2021-05-13 DIAGNOSIS — E06.3 HYPOTHYROIDISM, ACQUIRED, AUTOIMMUNE: ICD-10-CM

## 2021-05-13 PROCEDURE — 90040 PR PRENATAL FOLLOW UP: CPT | Performed by: OBSTETRICS & GYNECOLOGY

## 2021-05-13 ASSESSMENT — FIBROSIS 4 INDEX: FIB4 SCORE: 0.52

## 2021-05-24 NOTE — PROGRESS NOTES
Chief Complaint: Follow up for Hashimoto's Hypothyroidism, Vitamin D Deficiency & Vitamin B Deficiency.    HPI:     Osito Lange is a 30 y.o. female for continued evaluation & treatment of the following:     Pt is currently 25 weeks pregnant.  EDC 08/26/2021.    1. Hashimoto's Hypothyroidism    Liothyronine 5 mcg per day and Levothyroxine 112 mcg 5 days per week and 224mcg 2 days per week.     Since last visit patient reports feeling well, overall.  Pt reports good compliance and denies missing any daily doses.     Pt takes thyroid hormone before breakfast.     Pt denies taking any iron, calcium supplements or antacids.    Patient experiencing blurred vision at times, and increased constipation.  Patient states she tends to feel better when her TSH is suppressed and her free T4 is a little elevated.    Weight increased 10 pounds since prior appointment.    Hx Nodule on right side neck since she was a teenager.   Has never had an US of the Neck.   Denies tenderness or any new nodules/lumps.      Ref. Range 5/7/2021 14:21   TSH Latest Ref Range: 0.380 - 5.330 uIU/mL 0.988   Free T-4 Latest Ref Range: 0.93 - 1.70 ng/dL 0.94   T3,Free Latest Ref Range: 2.00 - 4.40 pg/mL 2.13     2. Vitamin D Deficiency  Currently on 2000 IU per day.     Ref. Range 1/28/2021 11:32   25-Hydroxy   Vitamin D 25 Latest Ref Range: 30 - 100 ng/mL 42       3. Vitamin B Deficiency  Currently prenatal vitamin with Folic Acid and Vitamin B12.    Gluten free diet.    Ref. Range 1/28/2021 11:32   Vitamin B12 -True Cobalamin Latest Ref Range: 211 - 911 pg/mL 764       Patient's medications, allergies, and social histories were reviewed and updated as appropriate.      ROS:     CONS:     No fever, no chills   EYES:     No diplopia, no blurry vision   CV:           No chest pain, no palpitations   PULM:     No SOB, no cough, no hemoptysis.   GI:            No nausea, no vomiting, no diarrhea, no constipation   ENDO:     No polyuria, no  polydipsia, no heat intolerance, no cold intolerance       Past Medical History:  Problem List:  2021-03: Encounter for supervision of normal first pregnancy in   second trimester  2020-01: Lip swelling  2020-01: Skin irritation  2019-09: Hypothyroidism, acquired, autoimmune  2018-11: Encounter for other contraceptive management  2018-11: Annual physical exam  2017-09: Hashimoto's thyroiditis  2017-09: Vitamin D deficiency      Past Surgical History:  No past surgical history on file.     Allergies:  Penicillins     Social History:  Social History     Tobacco Use   • Smoking status: Never Smoker   • Smokeless tobacco: Never Used   Vaping Use   • Vaping Use: Never used   Substance Use Topics   • Alcohol use: Not Currently     Comment: rarely   • Drug use: No        Family History:   family history is not on file.      PHYSICAL EXAM:   Vital signs: /70, heart rate 84, room air sat 98%.  GENERAL: Well-developed, well-nourished in no apparent distress.   EYE:  No ocular asymmetry, PERRLA  HENT: Pink, moist mucous membranes.    NECK: No thyromegaly.   CARDIOVASCULAR:  No murmurs  LUNGS: Clear breath sounds  ABDOMEN: Soft, nontender   EXTREMITIES: No clubbing, cyanosis, or edema.   NEUROLOGICAL: No gross focal motor abnormalities   LYMPH: No cervical adenopathy seen.   SKIN: No rashes, lesions.     ASSESSMENT/PLAN:     1. Hypothyroidism due to Hashimoto's thyroiditis  Stable  Continue taking Liothyronine 5 mcg per day.    Recommend alternating levothyroxine 112 mcg 1 day and to 224 mcg the next day.  Ideally would like free T4 to be increased above 1.0.  We will repeat labs in 4 weeks before next follow-up appointment.    2. Vitamin D deficiency  Stable  Continue taking vitamin D D2 1000 IU daily.    3. Vitamin B 12 deficiency  Stable  Continue taking prenatal vitamin with folic acid and B supplementation.     Repeat labs 1 week prior to follow up appointment.   Follow up appointment in 4 weeks.     Thank you kindly  for allowing me to participate in the thyroid care plan for this patient.    Niesha Meza, APRN  05/11/2021    CC:   Dm Irvin P.A.-C.

## 2021-06-08 ENCOUNTER — HOSPITAL ENCOUNTER (OUTPATIENT)
Dept: LAB | Facility: MEDICAL CENTER | Age: 31
End: 2021-06-08
Attending: NURSE PRACTITIONER
Payer: COMMERCIAL

## 2021-06-08 ENCOUNTER — HOSPITAL ENCOUNTER (OUTPATIENT)
Dept: LAB | Facility: MEDICAL CENTER | Age: 31
End: 2021-06-08
Attending: OBSTETRICS & GYNECOLOGY
Payer: COMMERCIAL

## 2021-06-08 DIAGNOSIS — E06.3 HYPOTHYROIDISM DUE TO HASHIMOTO'S THYROIDITIS: ICD-10-CM

## 2021-06-08 DIAGNOSIS — Z34.90 PREGNANCY, UNSPECIFIED GESTATIONAL AGE: ICD-10-CM

## 2021-06-08 DIAGNOSIS — E03.8 HYPOTHYROIDISM DUE TO HASHIMOTO'S THYROIDITIS: ICD-10-CM

## 2021-06-08 LAB
ERYTHROCYTE [DISTWIDTH] IN BLOOD BY AUTOMATED COUNT: 45.4 FL (ref 35.9–50)
GLUCOSE 1H P 50 G GLC PO SERPL-MCNC: 144 MG/DL (ref 70–139)
HCT VFR BLD AUTO: 38.3 % (ref 37–47)
HGB BLD-MCNC: 12.6 G/DL (ref 12–16)
MCH RBC QN AUTO: 32.2 PG (ref 27–33)
MCHC RBC AUTO-ENTMCNC: 32.9 G/DL (ref 33.6–35)
MCV RBC AUTO: 98 FL (ref 81.4–97.8)
PLATELET # BLD AUTO: 222 K/UL (ref 164–446)
PMV BLD AUTO: 9.1 FL (ref 9–12.9)
RBC # BLD AUTO: 3.91 M/UL (ref 4.2–5.4)
T3FREE SERPL-MCNC: 2.66 PG/ML (ref 2–4.4)
T4 FREE SERPL-MCNC: 1.27 NG/DL (ref 0.93–1.7)
TREPONEMA PALLIDUM IGG+IGM AB [PRESENCE] IN SERUM OR PLASMA BY IMMUNOASSAY: NORMAL
TSH SERPL DL<=0.005 MIU/L-ACNC: 0.04 UIU/ML (ref 0.38–5.33)
WBC # BLD AUTO: 10.4 K/UL (ref 4.8–10.8)

## 2021-06-08 PROCEDURE — 84439 ASSAY OF FREE THYROXINE: CPT

## 2021-06-08 PROCEDURE — 82950 GLUCOSE TEST: CPT

## 2021-06-08 PROCEDURE — 84443 ASSAY THYROID STIM HORMONE: CPT

## 2021-06-08 PROCEDURE — 86780 TREPONEMA PALLIDUM: CPT

## 2021-06-08 PROCEDURE — 36415 COLL VENOUS BLD VENIPUNCTURE: CPT

## 2021-06-08 PROCEDURE — 84481 FREE ASSAY (FT-3): CPT

## 2021-06-08 PROCEDURE — 85027 COMPLETE CBC AUTOMATED: CPT

## 2021-06-11 ENCOUNTER — OFFICE VISIT (OUTPATIENT)
Dept: ENDOCRINOLOGY | Facility: MEDICAL CENTER | Age: 31
End: 2021-06-11
Attending: NURSE PRACTITIONER
Payer: COMMERCIAL

## 2021-06-11 VITALS
SYSTOLIC BLOOD PRESSURE: 110 MMHG | DIASTOLIC BLOOD PRESSURE: 68 MMHG | HEART RATE: 117 BPM | WEIGHT: 178.9 LBS | BODY MASS INDEX: 28.75 KG/M2 | OXYGEN SATURATION: 98 % | HEIGHT: 66 IN

## 2021-06-11 DIAGNOSIS — E53.8 VITAMIN B 12 DEFICIENCY: ICD-10-CM

## 2021-06-11 DIAGNOSIS — E06.3 HASHIMOTO'S THYROIDITIS: ICD-10-CM

## 2021-06-11 DIAGNOSIS — E55.9 VITAMIN D DEFICIENCY: ICD-10-CM

## 2021-06-11 PROCEDURE — 99214 OFFICE O/P EST MOD 30 MIN: CPT | Performed by: NURSE PRACTITIONER

## 2021-06-11 PROCEDURE — 99211 OFF/OP EST MAY X REQ PHY/QHP: CPT | Performed by: NURSE PRACTITIONER

## 2021-06-11 ASSESSMENT — FIBROSIS 4 INDEX: FIB4 SCORE: 0.54

## 2021-06-12 NOTE — PROGRESS NOTES
Chief Complaint: Follow up for Hashimoto's Hypothyroidism, Vitamin D Deficiency & Vitamin B Deficiency.    HPI:     Osito Lange is a 30 y.o. female for continued evaluation & treatment of the following:     Pt is currently 30 weeks pregnant.  EDC 08/26/2021.    1. Hashimoto's Hypothyroidism    Liothyronine 5 mcg per day and Levothyroxine levothyroxine 112 mcg 1 day and to 224 mcg the next day  Since last visit patient reports feeling well, overall.  Pt reports good compliance and denies missing any daily doses.     Pt takes thyroid hormone 1 hour before breakfast.     Pt denies taking any iron, calcium supplements or antacids.    Patient states she tends to feel better when her TSH is suppressed and her free T4 is a little elevated.    Hx Nodule on right side neck since she was a teenager.   Has never had an US of the Neck.   Denies tenderness or any new nodules/lumps.      Ref. Range 6/8/2021 11:12   TSH Latest Ref Range: 0.380 - 5.330 uIU/mL 0.040 (L)   Free T-4 Latest Ref Range: 0.93 - 1.70 ng/dL 1.27   T3,Free Latest Ref Range: 2.00 - 4.40 pg/mL 2.66       2. Vitamin D Deficiency  Currently on 2000 IU per day.     Ref. Range 1/28/2021 11:32   25-Hydroxy   Vitamin D 25 Latest Ref Range: 30 - 100 ng/mL 42       3. Vitamin B Deficiency  Currently prenatal vitamin with Folic Acid and Vitamin B12.    Gluten free diet.    Ref. Range 1/28/2021 11:32   Vitamin B12 -True Cobalamin Latest Ref Range: 211 - 911 pg/mL 764       Patient's medications, allergies, and social histories were reviewed and updated as appropriate.      ROS:     CONS:     No fever, no chills   EYES:     No diplopia, no blurry vision   CV:           No chest pain, no palpitations   PULM:     No SOB, no cough, no hemoptysis.   GI:            No nausea, no vomiting, no diarrhea, no constipation   ENDO:     No polyuria, no polydipsia, no heat intolerance, no cold intolerance       Past Medical History:  Problem List:  2021-03: Encounter for  supervision of normal first pregnancy in   second trimester  2020-01: Lip swelling  2020-01: Skin irritation  2019-09: Hypothyroidism, acquired, autoimmune  2018-11: Encounter for other contraceptive management  2018-11: Annual physical exam  2017-09: Hashimoto's thyroiditis  2017-09: Vitamin D deficiency      Past Surgical History:  No past surgical history on file.     Allergies:  Penicillins     Social History:  Social History     Tobacco Use   • Smoking status: Never Smoker   • Smokeless tobacco: Never Used   Vaping Use   • Vaping Use: Never used   Substance Use Topics   • Alcohol use: Not Currently     Comment: rarely   • Drug use: No        Family History:   family history is not on file.      PHYSICAL EXAM:   Vital signs: /70, heart rate 84, room air sat 98%.  GENERAL: Well-developed, well-nourished in no apparent distress.   EYE:  No ocular asymmetry, PERRLA  HENT: Pink, moist mucous membranes.    NECK: No thyromegaly.   CARDIOVASCULAR:  No murmurs  LUNGS: Clear breath sounds  ABDOMEN: Soft, nontender   EXTREMITIES: No clubbing, cyanosis, or edema.   NEUROLOGICAL: No gross focal motor abnormalities   LYMPH: No cervical adenopathy seen.   SKIN: No rashes, lesions.     ASSESSMENT/PLAN:     1. Hypothyroidism due to Hashimoto's thyroiditis  Stable  Continue taking Liothyronine 5 mcg per day.    Recommend alternating levothyroxine 112 mcg 1 day and to 224 mcg the next day.  Ideally would like free T4 to be increased above 1.0.  We will repeat labs in 4 weeks before next follow-up appointment.    2. Vitamin D deficiency  Stable  Recommend increasing vitamin D to 4000 IU daily.    3. Vitamin B 12 deficiency  Stable  Continue taking prenatal vitamin with folic acid and B supplementation.     Repeat labs 1 week prior to follow up appointment.   Follow up appointment in 6 weeks.     Thank you kindly for allowing me to participate in the thyroid care plan for this patient.    Niesha Meza,  DENEEN  06/11/2021    CC:   Dm Irvin P.A.-C.

## 2021-06-14 ENCOUNTER — ROUTINE PRENATAL (OUTPATIENT)
Dept: OBGYN | Facility: CLINIC | Age: 31
End: 2021-06-14
Payer: COMMERCIAL

## 2021-06-14 VITALS — WEIGHT: 180 LBS | DIASTOLIC BLOOD PRESSURE: 69 MMHG | BODY MASS INDEX: 29.05 KG/M2 | SYSTOLIC BLOOD PRESSURE: 108 MMHG

## 2021-06-14 DIAGNOSIS — E06.3 HASHIMOTO'S THYROIDITIS: ICD-10-CM

## 2021-06-14 DIAGNOSIS — Z34.90 PREGNANCY, UNSPECIFIED GESTATIONAL AGE: ICD-10-CM

## 2021-06-14 PROBLEM — Z34.03 ENCOUNTER FOR SUPERVISION OF NORMAL FIRST PREGNANCY IN THIRD TRIMESTER: Status: ACTIVE | Noted: 2021-03-17

## 2021-06-14 PROCEDURE — 90040 PR PRENATAL FOLLOW UP: CPT | Performed by: OBSTETRICS & GYNECOLOGY

## 2021-06-14 ASSESSMENT — FIBROSIS 4 INDEX: FIB4 SCORE: 0.54

## 2021-06-15 ENCOUNTER — HOSPITAL ENCOUNTER (OUTPATIENT)
Dept: LAB | Facility: MEDICAL CENTER | Age: 31
End: 2021-06-15
Attending: OBSTETRICS & GYNECOLOGY
Payer: COMMERCIAL

## 2021-06-15 DIAGNOSIS — Z34.90 PREGNANCY, UNSPECIFIED GESTATIONAL AGE: ICD-10-CM

## 2021-06-15 LAB
GLUCOSE 1H P CHAL SERPL-MCNC: 156 MG/DL (ref 65–180)
GLUCOSE 2H P CHAL SERPL-MCNC: 126 MG/DL (ref 65–155)
GLUCOSE 3H P CHAL SERPL-MCNC: 145 MG/DL (ref 65–140)
GLUCOSE BS SERPL-MCNC: 70 MG/DL (ref 65–95)

## 2021-06-15 PROCEDURE — 82952 GTT-ADDED SAMPLES: CPT

## 2021-06-15 PROCEDURE — 36415 COLL VENOUS BLD VENIPUNCTURE: CPT

## 2021-06-15 PROCEDURE — 82951 GLUCOSE TOLERANCE TEST (GTT): CPT

## 2021-06-28 ENCOUNTER — ROUTINE PRENATAL (OUTPATIENT)
Dept: OBGYN | Facility: CLINIC | Age: 31
End: 2021-06-28
Payer: COMMERCIAL

## 2021-06-28 VITALS — SYSTOLIC BLOOD PRESSURE: 97 MMHG | BODY MASS INDEX: 29.21 KG/M2 | WEIGHT: 181 LBS | DIASTOLIC BLOOD PRESSURE: 62 MMHG

## 2021-06-28 DIAGNOSIS — Z34.90 PREGNANCY, UNSPECIFIED GESTATIONAL AGE: ICD-10-CM

## 2021-06-28 PROCEDURE — 90040 PR PRENATAL FOLLOW UP: CPT | Performed by: OBSTETRICS & GYNECOLOGY

## 2021-06-28 ASSESSMENT — FIBROSIS 4 INDEX: FIB4 SCORE: 0.54

## 2021-06-28 NOTE — PROGRESS NOTES
OB Follow Up--- High Risk  synthyroid           31 y.o. is a 30w5d presents in prenatal follow up.    Subjective:no complaints  . Fetal Movement  positive    Problem List:does not have any pertinent problems on file.     Objective:   BP (!) 97/62   Wt 82.1 kg (181 lb)   BMI 29.21 kg/m²     Abdomen:  S=D  Extremities:Normal        Lab:  Recent Results (from the past 336 hour(s))   GLUCOSE 3 HR GESTATIONAL    Collection Time: 06/15/21  7:29 AM   Result Value Ref Range    Baseline Glucose 70 65 - 95 mg/dL    Glucose 1 Hour 156 65 - 180 mg/dL    Glucose 2 Hour 126 65 - 155 mg/dL    Glucose 3 Hour 145 (H) 65 - 140 mg/dL         Assessment:    30w5d     High Risk  synthyroid    No pain, bleeding, unusual discharge or leeking    Plan:  2 weeks  Continue water intake  Ambulate nightly after 37 weeks  Continue Kick counts

## 2021-07-09 ENCOUNTER — HOSPITAL ENCOUNTER (OUTPATIENT)
Dept: RADIOLOGY | Facility: MEDICAL CENTER | Age: 31
End: 2021-07-09
Attending: OBSTETRICS & GYNECOLOGY
Payer: COMMERCIAL

## 2021-07-09 DIAGNOSIS — E06.3 HASHIMOTO'S THYROIDITIS: ICD-10-CM

## 2021-07-09 LAB
T3FREE SERPL-MCNC: 2.7 PG/ML (ref 2–4.4)
T4 FREE SERPL-MCNC: 1.13 NG/DL (ref 0.82–1.77)
TSH SERPL DL<=0.005 MIU/L-ACNC: 0.04 UIU/ML (ref 0.45–4.5)

## 2021-07-09 PROCEDURE — 76816 OB US FOLLOW-UP PER FETUS: CPT

## 2021-07-12 DIAGNOSIS — E03.8 HYPOTHYROIDISM DUE TO HASHIMOTO'S THYROIDITIS: ICD-10-CM

## 2021-07-12 DIAGNOSIS — E06.3 HYPOTHYROIDISM DUE TO HASHIMOTO'S THYROIDITIS: ICD-10-CM

## 2021-07-12 RX ORDER — LEVOTHYROXINE SODIUM 0.1 MG/1
112 TABLET ORAL DAILY
Qty: 90 TABLET | Refills: 3 | Status: SHIPPED | OUTPATIENT
Start: 2021-07-12 | End: 2021-07-16

## 2021-07-13 ENCOUNTER — ROUTINE PRENATAL (OUTPATIENT)
Dept: OBGYN | Facility: CLINIC | Age: 31
End: 2021-07-13
Payer: COMMERCIAL

## 2021-07-13 VITALS — DIASTOLIC BLOOD PRESSURE: 73 MMHG | BODY MASS INDEX: 30.18 KG/M2 | WEIGHT: 187 LBS | SYSTOLIC BLOOD PRESSURE: 130 MMHG

## 2021-07-13 DIAGNOSIS — O09.93 SUPERVISION OF HIGH RISK PREGNANCY IN THIRD TRIMESTER: ICD-10-CM

## 2021-07-13 PROCEDURE — 90040 PR PRENATAL FOLLOW UP: CPT | Performed by: OBSTETRICS & GYNECOLOGY

## 2021-07-13 ASSESSMENT — FIBROSIS 4 INDEX: FIB4 SCORE: 0.54

## 2021-07-14 ENCOUNTER — TELEPHONE (OUTPATIENT)
Dept: ENDOCRINOLOGY | Facility: MEDICAL CENTER | Age: 31
End: 2021-07-14

## 2021-07-14 NOTE — TELEPHONE ENCOUNTER
Patient called on 7/14/21 at 1559 and left a voicemail.  Voicemail stated the wrong medication was prescribed and she needs it corrected ASAP.     Called and spoke with patient.     Patient needs brand name synthroid only 112 mcg with a quantity of 36. Patient states her prescription was put in for generic 100mcg with quantity of 30.     [facility] [address] [facility].     Please advise.

## 2021-07-16 DIAGNOSIS — E06.3 HASHIMOTO'S THYROIDITIS: ICD-10-CM

## 2021-07-16 RX ORDER — LEVOTHYROXINE SODIUM 112 UG/1
112 TABLET ORAL
Qty: 36 TABLET | Refills: 11 | Status: SHIPPED | OUTPATIENT
Start: 2021-07-16 | End: 2021-08-09

## 2021-07-19 ENCOUNTER — OFFICE VISIT (OUTPATIENT)
Dept: ENDOCRINOLOGY | Facility: MEDICAL CENTER | Age: 31
End: 2021-07-19
Attending: NURSE PRACTITIONER
Payer: COMMERCIAL

## 2021-07-19 VITALS
WEIGHT: 189 LBS | HEIGHT: 66 IN | OXYGEN SATURATION: 96 % | DIASTOLIC BLOOD PRESSURE: 70 MMHG | SYSTOLIC BLOOD PRESSURE: 108 MMHG | HEART RATE: 92 BPM | BODY MASS INDEX: 30.37 KG/M2

## 2021-07-19 DIAGNOSIS — E55.9 VITAMIN D DEFICIENCY: ICD-10-CM

## 2021-07-19 DIAGNOSIS — E53.8 VITAMIN B 12 DEFICIENCY: ICD-10-CM

## 2021-07-19 DIAGNOSIS — E06.3 HASHIMOTO'S THYROIDITIS: ICD-10-CM

## 2021-07-19 PROCEDURE — 99214 OFFICE O/P EST MOD 30 MIN: CPT | Performed by: NURSE PRACTITIONER

## 2021-07-19 PROCEDURE — 99211 OFF/OP EST MAY X REQ PHY/QHP: CPT | Performed by: NURSE PRACTITIONER

## 2021-07-19 ASSESSMENT — FIBROSIS 4 INDEX: FIB4 SCORE: 0.54

## 2021-07-20 NOTE — PROGRESS NOTES
Chief Complaint: Follow up for Hashimoto's Hypothyroidism, Vitamin D Deficiency & Vitamin B Deficiency.    HPI:     Osito Lange is a 30 y.o. female for continued evaluation & treatment of the following:     Pt is currently 34 weeks pregnant.  EDC 09/01/2021.    1. Hashimoto's Hypothyroidism    Liothyronine 5 mcg per day and Levothyroxine levothyroxine 112 mcg 1 day and to 224 mcg the next day  Since last visit patient reports feeling well, overall.  Pt reports good compliance and denies missing any daily doses.     Pt takes thyroid hormone 1 hour before breakfast.     Pt denies taking any iron, calcium supplements or antacids.      Hx Nodule on right side neck since she was a teenager.   Has never had an US of the Neck.   Denies tenderness or any new nodules/lumps.      Ref. Range 7/8/2021 10:38   TSH Latest Ref Range: 0.450 - 4.500 uIU/mL 0.035 (L)   Free T-4 Latest Ref Range: 0.82 - 1.77 ng/dL 1.13   T3,Free Latest Ref Range: 2.0 - 4.4 pg/mL 2.7         2. Vitamin D Deficiency  Currently on 2000 IU per day.     Ref. Range 1/28/2021 11:32   25-Hydroxy   Vitamin D 25 Latest Ref Range: 30 - 100 ng/mL 42       3. Vitamin B Deficiency  Currently prenatal vitamin with Folic Acid and Vitamin B12.    Gluten free diet.    Ref. Range 1/28/2021 11:32   Vitamin B12 -True Cobalamin Latest Ref Range: 211 - 911 pg/mL 764       Patient's medications, allergies, and social histories were reviewed and updated as appropriate.      ROS:     CONS:     No fever, no chills   EYES:     No diplopia, no blurry vision   CV:           No chest pain, no palpitations   PULM:     No SOB, no cough, no hemoptysis.   GI:            No nausea, no vomiting, no diarrhea, no constipation   ENDO:     No polyuria, no polydipsia, no heat intolerance, no cold intolerance       Past Medical History:  Problem List:  2021-03: Encounter for supervision of normal first pregnancy in third   trimester  2020-01: Lip swelling  2020-01: Skin  irritation  2019-09: Hypothyroidism, acquired, autoimmune  2018-11: Encounter for other contraceptive management  2018-11: Annual physical exam  2017-09: Hashimoto's thyroiditis  2017-09: Vitamin D deficiency      Past Surgical History:  No past surgical history on file.     Allergies:  Penicillins     Social History:  Social History     Tobacco Use   • Smoking status: Never Smoker   • Smokeless tobacco: Never Used   Vaping Use   • Vaping Use: Never used   Substance Use Topics   • Alcohol use: Not Currently     Comment: rarely   • Drug use: No        Family History:   family history is not on file.      PHYSICAL EXAM:   Vital signs: /70, heart rate 84, room air sat 98%.  GENERAL: Well-developed, well-nourished in no apparent distress.   EYE:  No ocular asymmetry, PERRLA  HENT: Pink, moist mucous membranes.    NECK: No thyromegaly.   CARDIOVASCULAR:  No murmurs  LUNGS: Clear breath sounds  ABDOMEN: Soft, nontender   EXTREMITIES: No clubbing, cyanosis, or edema.   NEUROLOGICAL: No gross focal motor abnormalities   LYMPH: No cervical adenopathy seen.   SKIN: No rashes, lesions.     ASSESSMENT/PLAN:     1. Hypothyroidism due to Hashimoto's thyroiditis  Stable.  Continue taking Liothyronine 5 mcg per day.    Recommend alternating levothyroxine 112 mcg 1 day and to 224 mcg the next day.  Ideally would like free T4 to be increased above 1.0.  We will repeat labs in 4 weeks before next follow-up appointment.    2. Vitamin D deficiency  Stable.  Recommend increasing vitamin D to 4000 IU daily.    3. Vitamin B 12 deficiency  Stable.  Continue taking prenatal vitamin with folic acid and B supplementation.     Repeat labs 1 week prior to follow up appointment.   Follow up appointment in 6 weeks.     Thank you kindly for allowing me to participate in the thyroid care plan for this patient.    Niesha Meza, APRN  06/11/2021    CC:   Dm Irvin P.A.-C.

## 2021-07-26 ENCOUNTER — ROUTINE PRENATAL (OUTPATIENT)
Dept: OBGYN | Facility: CLINIC | Age: 31
End: 2021-07-26
Payer: COMMERCIAL

## 2021-07-26 VITALS — SYSTOLIC BLOOD PRESSURE: 120 MMHG | DIASTOLIC BLOOD PRESSURE: 72 MMHG | WEIGHT: 189 LBS | BODY MASS INDEX: 30.51 KG/M2

## 2021-07-26 DIAGNOSIS — E06.3 HYPOTHYROIDISM, ACQUIRED, AUTOIMMUNE: ICD-10-CM

## 2021-07-26 DIAGNOSIS — Z34.03 ENCOUNTER FOR SUPERVISION OF NORMAL FIRST PREGNANCY IN THIRD TRIMESTER: ICD-10-CM

## 2021-07-26 PROBLEM — Z30.8 ENCOUNTER FOR OTHER CONTRACEPTIVE MANAGEMENT: Status: RESOLVED | Noted: 2018-11-02 | Resolved: 2021-07-26

## 2021-07-26 PROCEDURE — 90040 PR PRENATAL FOLLOW UP: CPT | Performed by: OBSTETRICS & GYNECOLOGY

## 2021-07-26 ASSESSMENT — FIBROSIS 4 INDEX: FIB4 SCORE: 0.54

## 2021-07-26 NOTE — PROGRESS NOTES
S: Pt presents for routine OB follow up. Patient is doing well currently. Reports good fetal movement. States she felt like baby has dropped more  Reports rare [place] contractions. Denies vaginal bleeding, or leakage of fluid. Patient was seen by endocrinology and Synthroid doses were changed.  Questions answered.    O: /72   Wt 85.7 kg (189 lb)   BMI 30.51 kg/m²   Patients' weight gain, fluid intake and exercise level discussed.  Vitals, fundal height , fetal position, and FHR reviewed on flowsheet    Lab:No results found for this or any previous visit (from the past 336 hour(s)).    A/P:  31 y.o.  at 34w5d presents for routine obstetric follow-up. Doing well currently  Size equals dates   Encounter Diagnoses   Name Primary?   • Encounter for supervision of normal first pregnancy in third trimester    • Hypothyroidism, acquired, autoimmune          1.  Continue prenatal vitamins.  2.  Fetal kick counts daily reviewed.  3.  Exercise at least 30 minutes daily.  4.  Drink at least 2L of water daily  5. Pregnancy and  labor precautions educated.  6.  Follow-up in 1 week  7.  GBS @ 36 wks discussed

## 2021-07-28 ENCOUNTER — TELEPHONE (OUTPATIENT)
Dept: OBGYN | Facility: CLINIC | Age: 31
End: 2021-07-28

## 2021-07-28 NOTE — TELEPHONE ENCOUNTER
Called PT to inform her that her FMLA is ready. PT did not answer so I left a voicemail advising her to call me back when possible.

## 2021-08-04 ENCOUNTER — HOSPITAL ENCOUNTER (OUTPATIENT)
Facility: MEDICAL CENTER | Age: 31
End: 2021-08-04
Attending: OBSTETRICS & GYNECOLOGY
Payer: COMMERCIAL

## 2021-08-04 ENCOUNTER — ROUTINE PRENATAL (OUTPATIENT)
Dept: OBGYN | Facility: CLINIC | Age: 31
End: 2021-08-04
Payer: COMMERCIAL

## 2021-08-04 VITALS — BODY MASS INDEX: 31.15 KG/M2 | DIASTOLIC BLOOD PRESSURE: 78 MMHG | WEIGHT: 193 LBS | SYSTOLIC BLOOD PRESSURE: 122 MMHG

## 2021-08-04 DIAGNOSIS — E06.3 HYPOTHYROIDISM, ACQUIRED, AUTOIMMUNE: ICD-10-CM

## 2021-08-04 DIAGNOSIS — E06.3 HASHIMOTO'S THYROIDITIS: ICD-10-CM

## 2021-08-04 DIAGNOSIS — Z3A.36 36 WEEKS GESTATION OF PREGNANCY: ICD-10-CM

## 2021-08-04 LAB
FORWARD REASON: SPWHY: NORMAL
FORWARDED TO LAB: SPWHR: NORMAL
SPECIMEN SENT: SPWT1: NORMAL

## 2021-08-04 PROCEDURE — 90040 PR PRENATAL FOLLOW UP: CPT | Performed by: OBSTETRICS & GYNECOLOGY

## 2021-08-04 ASSESSMENT — FIBROSIS 4 INDEX: FIB4 SCORE: 0.54

## 2021-08-04 NOTE — PROGRESS NOTES
Chief complaint: Return visit    S: Pt presents for routine OB follow up. Good fetal movement.  No contractions, vaginal bleeding, or leakage of fluid.    Questions answered.    O: /78   Wt 87.5 kg (193 lb)   BMI 31.15 kg/m²   Patients' weight gain, fluid intake and exercise level discussed.  Vitals, fundal height , fetal position, and FHR reviewed on flowsheet    Lab:No results found for this or any previous visit (from the past 336 hour(s)).    A/P:  31 y.o.  at 36w0d presents for routine obstetric follow-up.  Size equals dates and/or scan    1.  Continue prenatal vitamins.  2.  Fetal kick counts.  3.  Exercise at least 30 minutes daily.  4.  Drink at least 2L of water daily  5.  Labor precautions educated.  6.  Follow-up in 1 weeks.  7.  GBS today    Hypothyroidism, being monitored by primary care.  Currently on 112 mcg x 2 days then 224 mcg x 1 day.    All questions answered

## 2021-08-08 DIAGNOSIS — E06.3 HASHIMOTO'S THYROIDITIS: ICD-10-CM

## 2021-08-09 RX ORDER — LEVOTHYROXINE SODIUM 112 MCG
TABLET ORAL
Qty: 90 TABLET | Refills: 4 | Status: SHIPPED | OUTPATIENT
Start: 2021-08-09 | End: 2021-08-10 | Stop reason: SDUPTHER

## 2021-08-10 DIAGNOSIS — E06.3 HYPOTHYROIDISM, ACQUIRED, AUTOIMMUNE: ICD-10-CM

## 2021-08-10 DIAGNOSIS — E06.3 HASHIMOTO'S THYROIDITIS: ICD-10-CM

## 2021-08-10 LAB — B-HEM STREP SPEC QL CULT: NEGATIVE

## 2021-08-10 RX ORDER — LIOTHYRONINE SODIUM 5 UG/1
5 TABLET ORAL
Qty: 90 TABLET | Refills: 3 | Status: SHIPPED | OUTPATIENT
Start: 2021-08-10 | End: 2022-09-29 | Stop reason: SDUPTHER

## 2021-08-10 RX ORDER — LEVOTHYROXINE SODIUM 112 UG/1
TABLET ORAL
Qty: 90 TABLET | Refills: 3 | Status: SHIPPED | OUTPATIENT
Start: 2021-08-10 | End: 2022-02-04 | Stop reason: SDUPTHER

## 2021-08-11 ENCOUNTER — ROUTINE PRENATAL (OUTPATIENT)
Dept: OBGYN | Facility: CLINIC | Age: 31
End: 2021-08-11
Payer: COMMERCIAL

## 2021-08-11 VITALS — WEIGHT: 193 LBS | DIASTOLIC BLOOD PRESSURE: 54 MMHG | SYSTOLIC BLOOD PRESSURE: 101 MMHG | BODY MASS INDEX: 31.15 KG/M2

## 2021-08-11 DIAGNOSIS — Z34.03 ENCOUNTER FOR SUPERVISION OF NORMAL FIRST PREGNANCY IN THIRD TRIMESTER: ICD-10-CM

## 2021-08-11 PROCEDURE — 90040 PR PRENATAL FOLLOW UP: CPT | Performed by: OBSTETRICS & GYNECOLOGY

## 2021-08-11 ASSESSMENT — FIBROSIS 4 INDEX: FIB4 SCORE: 0.54

## 2021-08-11 NOTE — PROGRESS NOTES
OB Followup;    37w0d    Patient Active Problem List    Diagnosis Date Noted   • Encounter for supervision of normal first pregnancy in third trimester 2021   • Lip swelling 2020   • Skin irritation 2020   • Hypothyroidism, acquired, autoimmune 2019   • Hashimoto's thyroiditis 09/15/2017   • Vitamin D deficiency 09/15/2017       Vitals:    21 1300 21 1301   BP: 134/77 101/54   Weight: 87.5 kg (193 lb)        Patient presents for followup of OB care. Currently doing well . Good fetal movement no leakage of fluid no contractions or vaginal bleeding        Size equals dates, normal fetal heart rate      Labs; GBS negative    Labor precautions given  Discussed proper weight gain during pregnancy.    Signs and symptoms of labor/ labor discussed   Discussed our group's policy on prenatal checkups and delivery  Discussed Covid precautions  Discussed Covid vaccination recommendations from CDC/ACOG  Discussed proper exercise during pregnancy  Discussed proper oral fluid hydration  Currently taking prenatal vitamins as instructed  Reviewed fetal kick counts and appropriate fetal movement during pregnancy  Reviewed postpartum birth control methods  All questions answered in detail    Followup in  1 weeks

## 2021-08-12 LAB — GP B STREP DNA SPEC QL NAA+PROBE: NEGATIVE

## 2021-08-17 ENCOUNTER — ROUTINE PRENATAL (OUTPATIENT)
Dept: OBGYN | Facility: CLINIC | Age: 31
End: 2021-08-17
Payer: COMMERCIAL

## 2021-08-17 VITALS — BODY MASS INDEX: 31.64 KG/M2 | DIASTOLIC BLOOD PRESSURE: 71 MMHG | SYSTOLIC BLOOD PRESSURE: 126 MMHG | WEIGHT: 196 LBS

## 2021-08-17 DIAGNOSIS — Z34.03 ENCOUNTER FOR SUPERVISION OF NORMAL FIRST PREGNANCY IN THIRD TRIMESTER: ICD-10-CM

## 2021-08-17 DIAGNOSIS — E06.3 HYPOTHYROIDISM, ACQUIRED, AUTOIMMUNE: ICD-10-CM

## 2021-08-17 PROCEDURE — 90040 PR PRENATAL FOLLOW UP: CPT | Performed by: OBSTETRICS & GYNECOLOGY

## 2021-08-17 ASSESSMENT — FIBROSIS 4 INDEX: FIB4 SCORE: 0.54

## 2021-08-17 NOTE — PROGRESS NOTES
S: Pt presents for routine OB follow up.  Patient is doing well.  Reports good fetal movement.  Reports few episodes of irregular contractions.denies vaginal bleeding, or leakage of fluid.    Questions answered.    O: /71   Wt 88.9 kg (196 lb)   BMI 31.64 kg/m²   Patients' weight gain, fluid intake and exercise level discussed.  Vitals, fundal height , fetal position, and FHR reviewed on flowsheet  SVE: 1.5/50/-2  Lab:  Recent Results (from the past 336 hour(s))   SPECIMEN FORWARDED    Collection Time: 21 10:51 PM   Result Value Ref Range    Forwarded to Lab: [facility]     Forward Reason: Insurance     Specimen Sent: Swab    STREP GP B CULTURE    Collection Time: 21  1:30 PM   Result Value Ref Range    Strep Gp B Culture Negative Negative       A/P:  31 y.o.  at 37w6d presents for routine obstetric follow-up.  Doing well currently  Size equals dates and/or scan    1.  Continue prenatal vitamins.  2.  Fetal kick counts daily discussed.  3.  Exercise at least 30 minutes daily.  4.  Drink at least 2L of water daily  5.  Pregnancy and labor precautions educated.  6.  Follow-up in 1 week  7.  GBS negative culture discussed

## 2021-08-22 ENCOUNTER — HOSPITAL ENCOUNTER (INPATIENT)
Facility: MEDICAL CENTER | Age: 31
LOS: 1 days | End: 2021-08-23
Attending: OBSTETRICS & GYNECOLOGY | Admitting: OBSTETRICS & GYNECOLOGY
Payer: COMMERCIAL

## 2021-08-22 LAB
BASOPHILS # BLD AUTO: 0.2 % (ref 0–1.8)
BASOPHILS # BLD: 0.02 K/UL (ref 0–0.12)
EOSINOPHIL # BLD AUTO: 0.01 K/UL (ref 0–0.51)
EOSINOPHIL NFR BLD: 0.1 % (ref 0–6.9)
ERYTHROCYTE [DISTWIDTH] IN BLOOD BY AUTOMATED COUNT: 45.1 FL (ref 35.9–50)
HCT VFR BLD AUTO: 43.1 % (ref 37–47)
HGB BLD-MCNC: 14.5 G/DL (ref 12–16)
HOLDING TUBE BB 8507: NORMAL
IMM GRANULOCYTES # BLD AUTO: 0.1 K/UL (ref 0–0.11)
IMM GRANULOCYTES NFR BLD AUTO: 0.9 % (ref 0–0.9)
LYMPHOCYTES # BLD AUTO: 1.44 K/UL (ref 1–4.8)
LYMPHOCYTES NFR BLD: 12.5 % (ref 22–41)
MCH RBC QN AUTO: 32.4 PG (ref 27–33)
MCHC RBC AUTO-ENTMCNC: 33.6 G/DL (ref 33.6–35)
MCV RBC AUTO: 96.4 FL (ref 81.4–97.8)
MONOCYTES # BLD AUTO: 0.56 K/UL (ref 0–0.85)
MONOCYTES NFR BLD AUTO: 4.9 % (ref 0–13.4)
NEUTROPHILS # BLD AUTO: 9.35 K/UL (ref 2–7.15)
NEUTROPHILS NFR BLD: 81.4 % (ref 44–72)
NRBC # BLD AUTO: 0 K/UL
NRBC BLD-RTO: 0 /100 WBC
PLATELET # BLD AUTO: 215 K/UL (ref 164–446)
PMV BLD AUTO: 10.5 FL (ref 9–12.9)
RBC # BLD AUTO: 4.47 M/UL (ref 4.2–5.4)
SARS-COV+SARS-COV-2 AG RESP QL IA.RAPID: NOTDETECTED
SARS-COV-2 RNA RESP QL NAA+PROBE: NOTDETECTED
SPECIMEN SOURCE: NORMAL
SPECIMEN SOURCE: NORMAL
WBC # BLD AUTO: 11.5 K/UL (ref 4.8–10.8)

## 2021-08-22 PROCEDURE — 59400 OBSTETRICAL CARE: CPT | Performed by: OBSTETRICS & GYNECOLOGY

## 2021-08-22 PROCEDURE — 700111 HCHG RX REV CODE 636 W/ 250 OVERRIDE (IP)

## 2021-08-22 PROCEDURE — 770002 HCHG ROOM/CARE - OB PRIVATE (112)

## 2021-08-22 PROCEDURE — U0003 INFECTIOUS AGENT DETECTION BY NUCLEIC ACID (DNA OR RNA); SEVERE ACUTE RESPIRATORY SYNDROME CORONAVIRUS 2 (SARS-COV-2) (CORONAVIRUS DISEASE [COVID-19]), AMPLIFIED PROBE TECHNIQUE, MAKING USE OF HIGH THROUGHPUT TECHNOLOGIES AS DESCRIBED BY CMS-2020-01-R: HCPCS

## 2021-08-22 PROCEDURE — 59409 OBSTETRICAL CARE: CPT

## 2021-08-22 PROCEDURE — 85025 COMPLETE CBC W/AUTO DIFF WBC: CPT

## 2021-08-22 PROCEDURE — 700101 HCHG RX REV CODE 250

## 2021-08-22 PROCEDURE — 10907ZC DRAINAGE OF AMNIOTIC FLUID, THERAPEUTIC FROM PRODUCTS OF CONCEPTION, VIA NATURAL OR ARTIFICIAL OPENING: ICD-10-PCS | Performed by: OBSTETRICS & GYNECOLOGY

## 2021-08-22 PROCEDURE — 700102 HCHG RX REV CODE 250 W/ 637 OVERRIDE(OP)

## 2021-08-22 PROCEDURE — 36415 COLL VENOUS BLD VENIPUNCTURE: CPT

## 2021-08-22 PROCEDURE — 99282 EMERGENCY DEPT VISIT SF MDM: CPT

## 2021-08-22 PROCEDURE — 59025 FETAL NON-STRESS TEST: CPT

## 2021-08-22 PROCEDURE — U0005 INFEC AGEN DETEC AMPLI PROBE: HCPCS

## 2021-08-22 PROCEDURE — 304965 HCHG RECOVERY SERVICES

## 2021-08-22 PROCEDURE — A9270 NON-COVERED ITEM OR SERVICE: HCPCS

## 2021-08-22 PROCEDURE — 87426 SARSCOV CORONAVIRUS AG IA: CPT

## 2021-08-22 RX ORDER — ALUMINA, MAGNESIA, AND SIMETHICONE 2400; 2400; 240 MG/30ML; MG/30ML; MG/30ML
30 SUSPENSION ORAL EVERY 6 HOURS PRN
Status: DISCONTINUED | OUTPATIENT
Start: 2021-08-22 | End: 2021-08-22 | Stop reason: HOSPADM

## 2021-08-22 RX ORDER — VITAMIN A ACETATE, BETA CAROTENE, ASCORBIC ACID, CHOLECALCIFEROL, .ALPHA.-TOCOPHEROL ACETATE, DL-, THIAMINE MONONITRATE, RIBOFLAVIN, NIACINAMIDE, PYRIDOXINE HYDROCHLORIDE, FOLIC ACID, CYANOCOBALAMIN, CALCIUM CARBONATE, FERROUS FUMARATE, ZINC OXIDE, CUPRIC OXIDE 3080; 12; 120; 400; 1; 1.84; 3; 20; 22; 920; 25; 200; 27; 10; 2 [IU]/1; UG/1; MG/1; [IU]/1; MG/1; MG/1; MG/1; MG/1; MG/1; [IU]/1; MG/1; MG/1; MG/1; MG/1; MG/1
1 TABLET, FILM COATED ORAL
Status: DISCONTINUED | OUTPATIENT
Start: 2021-08-23 | End: 2021-08-23 | Stop reason: HOSPADM

## 2021-08-22 RX ORDER — IBUPROFEN 800 MG/1
800 TABLET ORAL EVERY 8 HOURS PRN
Status: DISCONTINUED | OUTPATIENT
Start: 2021-08-22 | End: 2021-08-23 | Stop reason: HOSPADM

## 2021-08-22 RX ORDER — SODIUM CHLORIDE, SODIUM LACTATE, POTASSIUM CHLORIDE, CALCIUM CHLORIDE 600; 310; 30; 20 MG/100ML; MG/100ML; MG/100ML; MG/100ML
INJECTION, SOLUTION INTRAVENOUS PRN
Status: DISCONTINUED | OUTPATIENT
Start: 2021-08-22 | End: 2021-08-23 | Stop reason: HOSPADM

## 2021-08-22 RX ORDER — ONDANSETRON 4 MG/1
4 TABLET, ORALLY DISINTEGRATING ORAL EVERY 6 HOURS PRN
Status: DISCONTINUED | OUTPATIENT
Start: 2021-08-22 | End: 2021-08-23 | Stop reason: HOSPADM

## 2021-08-22 RX ORDER — LIDOCAINE HYDROCHLORIDE 10 MG/ML
INJECTION, SOLUTION INFILTRATION; PERINEURAL
Status: COMPLETED
Start: 2021-08-22 | End: 2021-08-22

## 2021-08-22 RX ORDER — DOCUSATE SODIUM 100 MG/1
100 CAPSULE, LIQUID FILLED ORAL 2 TIMES DAILY PRN
Status: DISCONTINUED | OUTPATIENT
Start: 2021-08-22 | End: 2021-08-23 | Stop reason: HOSPADM

## 2021-08-22 RX ORDER — ONDANSETRON 2 MG/ML
4 INJECTION INTRAMUSCULAR; INTRAVENOUS EVERY 6 HOURS PRN
Status: DISCONTINUED | OUTPATIENT
Start: 2021-08-22 | End: 2021-08-23 | Stop reason: HOSPADM

## 2021-08-22 RX ORDER — BISACODYL 10 MG
10 SUPPOSITORY, RECTAL RECTAL PRN
Status: DISCONTINUED | OUTPATIENT
Start: 2021-08-22 | End: 2021-08-23 | Stop reason: HOSPADM

## 2021-08-22 RX ORDER — SODIUM CHLORIDE, SODIUM LACTATE, POTASSIUM CHLORIDE, CALCIUM CHLORIDE 600; 310; 30; 20 MG/100ML; MG/100ML; MG/100ML; MG/100ML
INJECTION, SOLUTION INTRAVENOUS CONTINUOUS
Status: ACTIVE | OUTPATIENT
Start: 2021-08-22 | End: 2021-08-22

## 2021-08-22 RX ORDER — ACETAMINOPHEN 500 MG
1000 TABLET ORAL EVERY 6 HOURS
Status: DISCONTINUED | OUTPATIENT
Start: 2021-08-22 | End: 2021-08-23 | Stop reason: HOSPADM

## 2021-08-22 RX ADMIN — IBUPROFEN 800 MG: 800 TABLET, FILM COATED ORAL at 19:59

## 2021-08-22 RX ADMIN — OXYTOCIN 2000 ML/HR: 10 INJECTION, SOLUTION INTRAMUSCULAR; INTRAVENOUS at 18:05

## 2021-08-22 RX ADMIN — Medication 20 ML: at 18:18

## 2021-08-22 RX ADMIN — FENTANYL CITRATE 100 MCG: 50 INJECTION, SOLUTION INTRAMUSCULAR; INTRAVENOUS at 16:10

## 2021-08-22 RX ADMIN — OXYTOCIN 125 ML/HR: 10 INJECTION, SOLUTION INTRAMUSCULAR; INTRAVENOUS at 19:08

## 2021-08-22 RX ADMIN — LIDOCAINE HYDROCHLORIDE 20 ML: 10 INJECTION, SOLUTION INFILTRATION; PERINEURAL at 18:18

## 2021-08-22 RX ADMIN — ACETAMINOPHEN 1000 MG: 500 TABLET ORAL at 19:59

## 2021-08-22 ASSESSMENT — FIBROSIS 4 INDEX: FIB4 SCORE: 0.54

## 2021-08-22 ASSESSMENT — LIFESTYLE VARIABLES: EVER_SMOKED: NEVER

## 2021-08-22 NOTE — H&P
"OB H&P:    CC: Contractions    HPI:  Ms. Osito Rivera is a 31 y.o.  @ 38w4d by US. The patient reports that she has been experiencing contractions every 2 to 5 minutes at home. She describes them as strong enough that she \"has to breathe through them\". She denies any LOF or vaginal bleeding. She notes positive fetal movements. The patient is GBS negative and notes a history of hashimoto's thyroiditis which has been closely monitored throughout the pregnancy without complication. She denies any other significant past medical history, past surgical history or history of cervical procedure. The patient reports no additional complaints at this time and is requesting \"all natural birth\". She states she does not currently want an epidural but will consider it.     Contractions: Yes   Loss of fluid: No   Vaginal bleeding: No   Fetal movement: present      PNC with [facility]    PNL:  Rh+/-, RI, HIV neg, TrepAb neg, HBsAg NR, GC/CT neg/neg  Glucola: 144 mg/dL at 1 hour in 2nd trimester, 126 mg/dL at 2 hour and 145 mg/dL at 3 hour in 3rd trimester  GBS -      ROS:  Const: denies fevers, general concerns  CV/resp: reports no concerns  GI: denies abd pain, GI concerns  : see HPI  Neuro: denies HA/vision changes    OB History    Para Term  AB Living   1             SAB TAB Ectopic Molar Multiple Live Births                    # Outcome Date GA Lbr Neal/2nd Weight Sex Delivery Anes PTL Lv   1 Current                GYN: denies STIs, no cervical procedures    Past Medical History:   Diagnosis Date   • Asthma    • Hypotension    • Thyroid disease        Patient reports no surgical history.       Current Outpatient Medications on File Prior to Encounter   Medication Sig Dispense Refill   • Cholecalciferol (VITAMIN D-3) 125 MCG (5000 UT) Tab Take  by mouth.     • Prenatal MV-Min-Fe Fum-FA-DHA (PRENATAL 1 PO) Take  by mouth.         Patient reports no significant past family history    Social History     Tobacco " "Use   • Smoking status: Never Smoker   • Smokeless tobacco: Never Used   Vaping Use   • Vaping Use: Never used   Substance Use Topics   • Alcohol use: Not Currently     Comment: rarely   • Drug use: No         PE:  Vitals:    21 0943   BP: 131/86   Pulse: 97   Resp: 16   Temp: 36.5 °C (97.7 °F)   TempSrc: Temporal   SpO2: 96%   Weight: 88.9 kg (196 lb)   Height: 1.676 m (5' 6\")     gen: AAO, NAD  abd: soft, gravid, NT, EFW 3400g  Ext: NT, no edema    SVE: 6/100/-2 @ 1330  FHT: 125/moderate variability/+ accels/ - decels  Juan: Contractions noted every 2-5 on external tocometry    A/P: 31 y.o.  @ 38w4d by LMP with active labor.     -Monitor vitals   -CEFM and tocometry   -IVF   -CBC, Covid testing   -Patient advised on pain control options, states she would like to hold off and think about it at this time.      Juan Kilpatrick M.D.        "

## 2021-08-22 NOTE — PROGRESS NOTES
Pt is a ; JACKIE of ; making her 38w4d. Pt here c/o painful Ucs q2-5 minutes for >1hour. Pt denies LOF, VB and reports +FM. EFM and TOCO applied. Bps cycling. SVE at 4-5/100/-2 with a BBOW. Dr Kilpatrick updated; at bedside to see pt.     Pr desires to go as natural as possible. Doesn't desire pitocin, AROM, or medication if possible. POC is to watch and recheck in an hour or so.     Pt rechecked, SVE at 6/100/-2 with a BBOW. Report given to Dr Kilpatrick.     Pt ambulated to S216. Report given to Elinor.

## 2021-08-22 NOTE — ED PROVIDER NOTES
"OB H&P:    CC: Contractions    HPI:  Ms. Osito Rivera is a 31 y.o.  @ 38w4d by US. The patient reports that she has been experiencing contractions every 2 to 5 minutes at home. She describes them as strong enough that she \"has to breathe through them\". She denies any LOF or vaginal bleeding. She notes positive fetal movements. The patient is GBS negative and notes a history of hashimoto's thyroiditis which has been closely monitored throughout the pregnancy without complication. She denies any other significant past medical history, past surgical history or history of cervical procedure. The patient reports no additional complaints at this time and is requesting \"all natural birth\". She states she does not currently want an epidural but will consider it.     Contractions: Yes   Loss of fluid: No   Vaginal bleeding: No   Fetal movement: present      PNC with [facility]    PNL:  Rh+/-, RI, HIV neg, TrepAb neg, HBsAg NR, GC/CT neg/neg  Glucola: 144 mg/dL at 1 hour in 2nd trimester, 126 mg/dL at 2 hour and 145 mg/dL at 3 hour in 3rd trimester  GBS -      ROS:  Const: denies fevers, general concerns  CV/resp: reports no concerns  GI: denies abd pain, GI concerns  : see HPI  Neuro: denies HA/vision changes    OB History    Para Term  AB Living   1             SAB TAB Ectopic Molar Multiple Live Births                    # Outcome Date GA Lbr Neal/2nd Weight Sex Delivery Anes PTL Lv   1 Current                GYN: denies STIs, no cervical procedures    Past Medical History:   Diagnosis Date   • Asthma    • Hypotension    • Thyroid disease        Patient denies any surgical history      Current Outpatient Medications on File Prior to Encounter   Medication Sig Dispense Refill   • Cholecalciferol (VITAMIN D-3) 125 MCG (5000 UT) Tab Take  by mouth.     • Prenatal MV-Min-Fe Fum-FA-DHA (PRENATAL 1 PO) Take  by mouth.         Patient reports no significant past family history    Social History     Tobacco " "Use   • Smoking status: Never Smoker   • Smokeless tobacco: Never Used   Vaping Use   • Vaping Use: Never used   Substance Use Topics   • Alcohol use: Not Currently     Comment: rarely   • Drug use: No         PE:  Vitals:    21 0943   BP: 131/86   Pulse: 97   Resp: 16   Temp: 36.5 °C (97.7 °F)   TempSrc: Temporal   SpO2: 96%   Weight: 88.9 kg (196 lb)   Height: 1.676 m (5' 6\")     gen: AAO, NAD  abd: soft, gravid, NT, EFW 3400g  Ext: NT, no edema    SVE: 4-5/100/-2 @ 1000  FHT: 120/moderate variability/+ accels/ - decels  Juan: contractions every 2 to 5 minutes on external tocometry    A/P: 31 y.o.  @ 38w4d by LMP with contractions.     -Monitor vitals   -SVE consistent with early labor   -CEFM and external tocometry   -Will recheck in 1 hour for possible admission to L&D    -Recheck SVE: 100/-2   -Admit to L&D      Juan Kilpatrick M.D.        "

## 2021-08-23 VITALS
HEART RATE: 75 BPM | WEIGHT: 196 LBS | DIASTOLIC BLOOD PRESSURE: 77 MMHG | HEIGHT: 66 IN | SYSTOLIC BLOOD PRESSURE: 110 MMHG | RESPIRATION RATE: 16 BRPM | BODY MASS INDEX: 31.5 KG/M2 | OXYGEN SATURATION: 96 % | TEMPERATURE: 97.6 F

## 2021-08-23 LAB
ERYTHROCYTE [DISTWIDTH] IN BLOOD BY AUTOMATED COUNT: 43.8 FL (ref 35.9–50)
HCT VFR BLD AUTO: 32.7 % (ref 37–47)
HGB BLD-MCNC: 11.4 G/DL (ref 12–16)
MCH RBC QN AUTO: 33.1 PG (ref 27–33)
MCHC RBC AUTO-ENTMCNC: 34.9 G/DL (ref 33.6–35)
MCV RBC AUTO: 95.1 FL (ref 81.4–97.8)
PLATELET # BLD AUTO: 170 K/UL (ref 164–446)
PMV BLD AUTO: 10.6 FL (ref 9–12.9)
RBC # BLD AUTO: 3.44 M/UL (ref 4.2–5.4)
WBC # BLD AUTO: 15.4 K/UL (ref 4.8–10.8)

## 2021-08-23 PROCEDURE — 700102 HCHG RX REV CODE 250 W/ 637 OVERRIDE(OP): Performed by: OBSTETRICS & GYNECOLOGY

## 2021-08-23 PROCEDURE — A9270 NON-COVERED ITEM OR SERVICE: HCPCS

## 2021-08-23 PROCEDURE — 36415 COLL VENOUS BLD VENIPUNCTURE: CPT

## 2021-08-23 PROCEDURE — 700102 HCHG RX REV CODE 250 W/ 637 OVERRIDE(OP)

## 2021-08-23 PROCEDURE — 85027 COMPLETE CBC AUTOMATED: CPT

## 2021-08-23 PROCEDURE — A9270 NON-COVERED ITEM OR SERVICE: HCPCS | Performed by: OBSTETRICS & GYNECOLOGY

## 2021-08-23 RX ORDER — IBUPROFEN 800 MG/1
800 TABLET ORAL EVERY 8 HOURS PRN
Qty: 60 TABLET | Refills: 0 | Status: SHIPPED | OUTPATIENT
Start: 2021-08-23 | End: 2022-05-23

## 2021-08-23 RX ORDER — PSEUDOEPHEDRINE HCL 30 MG
100 TABLET ORAL 2 TIMES DAILY PRN
Qty: 60 CAPSULE | Refills: 0 | Status: SHIPPED | OUTPATIENT
Start: 2021-08-23 | End: 2021-09-08

## 2021-08-23 RX ADMIN — IBUPROFEN 800 MG: 800 TABLET, FILM COATED ORAL at 06:18

## 2021-08-23 RX ADMIN — ACETAMINOPHEN 1000 MG: 500 TABLET ORAL at 06:18

## 2021-08-23 RX ADMIN — PRENATAL WITH FERROUS FUM AND FOLIC ACID 1 TABLET: 3080; 920; 120; 400; 22; 1.84; 3; 20; 10; 1; 12; 200; 27; 25; 2 TABLET ORAL at 08:33

## 2021-08-23 RX ADMIN — IBUPROFEN 800 MG: 800 TABLET, FILM COATED ORAL at 14:20

## 2021-08-23 RX ADMIN — ACETAMINOPHEN 1000 MG: 500 TABLET ORAL at 11:54

## 2021-08-23 RX ADMIN — ACETAMINOPHEN 1000 MG: 500 TABLET ORAL at 18:35

## 2021-08-23 ASSESSMENT — EDINBURGH POSTNATAL DEPRESSION SCALE (EPDS)
I HAVE LOOKED FORWARD WITH ENJOYMENT TO THINGS: AS MUCH AS I EVER DID
I HAVE BEEN ABLE TO LAUGH AND SEE THE FUNNY SIDE OF THINGS: AS MUCH AS I ALWAYS COULD
THE THOUGHT OF HARMING MYSELF HAS OCCURRED TO ME: NEVER
I HAVE FELT SCARED OR PANICKY FOR NO GOOD REASON: NO, NOT AT ALL
I HAVE BEEN SO UNHAPPY THAT I HAVE BEEN CRYING: NO, NEVER
I HAVE BEEN ANXIOUS OR WORRIED FOR NO GOOD REASON: HARDLY EVER
THINGS HAVE BEEN GETTING ON TOP OF ME: NO, I HAVE BEEN COPING AS WELL AS EVER
I HAVE BEEN SO UNHAPPY THAT I HAVE HAD DIFFICULTY SLEEPING: NOT AT ALL
I HAVE BLAMED MYSELF UNNECESSARILY WHEN THINGS WENT WRONG: NO, NEVER
I HAVE FELT SAD OR MISERABLE: NO, NOT AT ALL

## 2021-08-23 NOTE — LACTATION NOTE
This note was copied from a baby's chart.  , 31yr MOB, 38wk4d term  at 13 hours of age.  MOB reports baby was STS and breast fed well after birth but has been sleepy through the nigh and not staying latched. Currently no nipple pain or breast tenderness.    Baby awake and offered to assist.  MOB held baby skin to skin and attempting to latch in football position to left breast.  Baby rooting and awake.  Not able to obtain a proper latch in football hold as baby was tucking in lower lip, dimpling and spitting out nipple.  After several attempts, repositioned  To cross cradle.  Baby again was rooting and initially latching but them letting go and sucking on just nipple with dimpling and tucking in lower lip.  Parents taught signs of proper latch including both lips flanged out, deep latch and chin placement without dimpling.  Some position adjustments made but baby kept latching and letting go.  Utilized nipple shiled and instructed parents on how to use.  Baby latched immediately and well.  Both parents encouraged to try each feeding without shield first and to use if unable to get a good latch.      Outpatient lactation information given and encouraged to use if concern or pain once home.    Encouraged to call RN or LC if any further questions, concerns or in need of latching assistance.

## 2021-08-23 NOTE — L&D DELIVERY NOTE
DELIVERY NOTE - Normal Spontaneous Vaginal Delivery     Patient was admitted to Labor and Delivery at 38w4d for active labor.    Viable female infant delivered in OA presentation over intact perineum with apgars of 8 and 9, weight pending with no anesthesia. Infant placed on maternal abdomen.  Delayed cord clamping performed when cord stopped pulsating.  Cord cut by father of baby. Placenta delivered spontaneously, was 3 vessel and intact.  Cervix and perineum inspected.  Bilateral sulcal incisions repaired with 2-0 Chromic in usual fashion.  Hemostasis obtained.  EBL 500cc.   20 units of Pitocin in  1000ml LR administered IV after delivery.  There were no complications.  Mother and infant in stable condition in room.

## 2021-08-23 NOTE — PROGRESS NOTES
Report received from Erin HOWARD.  Patient in room, IV started.  Discussed POC with patient, patient declines vitamin K and erythromycin for baby.  She would like to avoid pitocin, after discussing this with patient she is ok with post partum pitocin if needed.  She would like to do this without an epidural but may use fentanyl.      Patient helped to change positions frequently.  At the bedside standing and hands and knees. AROM at 1545/clear. Requesting fentanyl and given at 1610.  Patient complete at 1640, pushing started at 1645,  at 1759.      Recovery:  Firm/light/at U.  Bilateral sulcus repair done, edema noted, ice applied.  Baby skin to skin, able to latch.  Patient given and ok with post partum pitocin.      Report given to Jj HOWARD.

## 2021-08-23 NOTE — PROGRESS NOTES
Pt. Admitted to floor by Jj, L&D RN. Pt. Assessed at this time. Vital signs WDL. Fundus firm, lochia light. Pt. Educated on infant safety and use of call light add when to call for assistance. Pt. Will continue to monitor.

## 2021-08-23 NOTE — PROGRESS NOTES
1900 - report from LEE Moya.   2055 - transferred to postpartum via wheelchair in stable condition with infant in arms. Report to LEE Rust.

## 2021-08-23 NOTE — DISCHARGE SUMMARY
Discharge Summary:      Osito Rivera    Admit Date:   2021  Discharge Date:  2021     Admitting diagnosis:  Indication for care in labor or delivery [O75.9]  Discharge Diagnosis: Status post vaginal, spontaneous.  Pregnancy Complications: hypothyroidism  Tubal Ligation:  no        History:  Past Medical History:   Diagnosis Date   • Asthma    • Hypotension    • Thyroid disease      OB History    Para Term  AB Living   1 1 1     1   SAB TAB Ectopic Molar Multiple Live Births           0 1      # Outcome Date GA Lbr Neal/2nd Weight Sex Delivery Anes PTL Lv   1 Term 21 38w4d / 01:26 3.21 kg (7 lb 1.2 oz) F Vag-Spont None N HOA        Penicillins  Patient Active Problem List    Diagnosis Date Noted   • Encounter for supervision of normal first pregnancy in third trimester 2021   • Lip swelling 2020   • Skin irritation 2020   • Hypothyroidism, acquired, autoimmune 2019   • Hashimoto's thyroiditis 09/15/2017   • Vitamin D deficiency 09/15/2017        Hospital Course:   31 y.o. , now para 1, was admitted with the above mentioned diagnosis, underwent Active Labor, vaginal, spontaneous. Patient postpartum course was unremarkable, with progressive advancement in diet , ambulation and toleration of oral analgesia. Patient without complaints today and desires discharge.      Vitals:    21 1928 21 2100 21 0200 21 0600   BP: 115/58 122/81 103/69 107/63   Pulse: 96 94 74 74   Resp:  18 17 18   Temp:  36.8 °C (98.3 °F) 36.7 °C (98 °F) 36.6 °C (97.9 °F)   TempSrc:  Temporal Temporal Temporal   SpO2:  94% 96% 95%   Weight:       Height:           Current Facility-Administered Medications   Medication Dose   • ondansetron (ZOFRAN ODT) dispertab 4 mg  4 mg    Or   • ondansetron (ZOFRAN) syringe/vial injection 4 mg  4 mg   • oxytocin (PITOCIN) infusion (for postpartum)   mL/hr   • ibuprofen (MOTRIN) tablet 800 mg  800 mg   •  acetaminophen (TYLENOL) tablet 1,000 mg  1,000 mg   • LR infusion     • tetanus-dipth-acell pertussis (Tdap) inj 0.5 mL  0.5 mL   • PRN oxytocin (PITOCIN) (20 Units/1000 mL) PRN for excessive uterine bleeding - See Admin Instr  125-999 mL/hr   • docusate sodium (COLACE) capsule 100 mg  100 mg   • bisacodyl (DULCOLAX) suppository 10 mg  10 mg   • magnesium hydroxide (MILK OF MAGNESIA) suspension 30 mL  30 mL   • prenatal plus vitamin (STUARTNATAL 1+1) 27-1 MG tablet 1 Tablet  1 Tablet       Exam:  Breast Exam: negative, lactating  Abdomen: Abdomen soft, non-tender. BS normal. No masses,  No organomegaly  Fundus Non Tender: yes  Incision: none  Perineum: perineum intact, bilateral labial edema, L>R  Extremity: no edema, calves NT     Labs:  Recent Labs     08/22/21  1409 08/23/21  0503   WBC 11.5* 15.4*   RBC 4.47 3.44*   HEMOGLOBIN 14.5 11.4*   HEMATOCRIT 43.1 32.7*   MCV 96.4 95.1   MCH 32.4 33.1*   MCHC 33.6 34.9   RDW 45.1 43.8   PLATELETCT 215 170   MPV 10.5 10.6        Activity:   Discharge to home  Pelvic Rest x 6 weeks    Assessment:  normal postpartum course  Discharge Assessment: Labial edema, but Voiding without difficulty  Hemodynamically stable.   Plans to use natural family planning for contraception     Follow up: [facility] or [facility] in 5 weeks for vaginal ; 1 week for incision check.        Discharge Meds:   Current Outpatient Medications   Medication Sig Dispense Refill   • docusate sodium 100 MG Cap Take 100 mg by mouth 2 times a day as needed for Constipation. 60 Capsule 0   • ibuprofen (MOTRIN) 800 MG Tab Take 1 Tablet by mouth every 8 hours as needed (For cramping after delivery; do not give if patient is receiving ketorolac (Toradol)). 60 Tablet 0       Audrey Amato M.D.

## 2021-08-24 NOTE — CARE PLAN
The patient is Stable - Low risk of patient condition declining or worsening    Shift Goals  Clinical Goals: Maintain fundus firm, lochia light; pain management  Patient Goals: discharge    Progress made toward(s) clinical / shift goals:  MET

## 2021-08-24 NOTE — PROGRESS NOTES
0717- Bedside report received from LEE Rust.  Patient denied needs.  0900- Patient working with Lactation consultant at this time.  1147- Patient assessment done.  Patient stated that she is voiding without difficulty and passing flatus.  Patient denied dizziness and stated that she is walking without difficulty.  Discussed pain management plan.  Patient will call when pain intervention needed.  Reviewed plan of care.  Patient verbalized understanding.  FOB at bedside.  1345- Patient stated desire for discharge home today and was encouraged to read the written patient discharge education/instruction sheet.  1915- Patient stated she read the written patient discharge education/instruction sheet and has no questions.  Discharge instructions reviewed with patient who verbalized understanding and stated she has no questions.  Patient will call when ready for discharge out to vehicle.  1947- Patient stated that she is ready for discharge.  Patient discharged to home, no change noted in condition, via wheelchair with infant and FOB.

## 2021-08-24 NOTE — DISCHARGE INSTRUCTIONS
PATIENT DISCHARGE EDUCATION INSTRUCTION SHEET      REASONS TO CALL YOUR OBSTETRICIAN  · Persistent fever, shaking, chills (Temperature higher than 100.4) may indicate you have an infection  · Heavy bleeding: soaking more than 1 pad per hour; Passing clots an egg-sized clot or bigger may mean you have an postpartum hemorrhage  · Foul odor from vagina or bad smelling or discolored discharge or blood  · Breast infection (Mastitis symptoms); breast pain, chills, fever, redness or red streaks, may feel flu like symptoms  · Urinary pain, burning or frequency  · Incision that is not healing, increased redness, swelling, tenderness or pain, or any pus from episiotomy or  site may mean you have an infection  · Redness, swelling, warmth, or painful to touch in the calf area of your leg may mean you have a blood clot  · Severe or intensified depression, thoughts or feelings of wanting to hurt yourself or someone else   · Pain in chest, obstructed breathing or shortness of breath (trouble catching your breath) may mean you are having a postpartum complication. Call your provider immediately   · Headache that does not get better, even after taking medicine, a bad headache with vision changes or pain in the upper right area of your belly may mean you have high blood pressure or post birth preeclampsia. Call your provider immediately    HAND WASHING  All family and friends should wash their hands:  · Before and after holding the baby  · Before feeding the baby  · After using the restroom or changing the baby's diaper    WOUND CARE  Ask your physician for additional care instructions. In general:  Episiotomy/Laceration  · May use renzo-spray bottle, witch hazel pads and dermaplast spray for comfort  · Use renzo-spray bottle after urinating to cleanse perineal area  · To prevent burning during urination spray renzo-water bottle on labial area   · Pat perineal area dry until episiotomy/laceration is healed  · Continue to use  renzo-bottle until bleeding stops as needed  · If have a 2nd degree laceration or greater, a Sitz bath can offer relief from soreness, burning, and inflammation   · Sitz Bath   · Sit in 6 inches of warm water and soak laceration as needed until the laceration heals    VAGINAL CARE AND BLEEDING  · Nothing inside vagina for 6 weeks:   · No sexual intercourse, tampons or douching  · Bleeding may continue for 2-4 weeks. Amount and color may vary  · Soaking 1 pad or more in an hour for several hours is considered heavy bleeding  · Passing large egg sized blood clots can be concerning  · If you feel like you have heavy bleeding or are having increasing amount of blood clots call your Obstetrician immediately  · If you begin feeling faint upon standing, feeling sick to your stomach, have clammy skin, a really fast heartbeat, have chills, start feeling confused, dizzy, sleepy or weak, or feeling like you're going to faint call your Obstetrician immediately    HYPERTENSION   Preeclampsia or gestational hypertension are types of high blood pressure that only pregnant women can get. It is important for you to be aware of symptoms to seek early intervention and treatment. If you have any of these symptoms immediately call your Obstetrician    · Vision changes or blurred vision   · Severe headache or pain that is unrelieved with medication and will not go away  · Persistent pain in upper abdomen or shoulder   · Increased swelling of face, feet, or hands  · Difficulty breathing or shortness of breath at rest  · Urinating less than usual    URINATION AND BOWEL MOVEMENTS  · Eating more fiber (bran cereal, fruits, and vegetables) and drinking plenty of fluids will help to avoid constipation  · Urinary frequency and urgency after childbirth is normal  · If you experience any urinary pain, burning or frequency call your provider    BIRTH CONTROL  · It is possible to become pregnant at any time after delivery and while  "breastfeeding  · Plan to discuss a method of birth control with your physician at your post delivery follow up visit    POSTPARTUM BLUES  During the first few days after birth, you may experience a sense of the \"blues\" which may include impatience, irritability or even crying. These feelings come and go quickly. However, as many as 1 in 10 women experience emotional symptoms known as postpartum depression.     POSTPARTUM DEPRESSION  May start as early as the second or third day after delivery or take several weeks or months to develop. Symptoms of \"blues\" are present, but are more intense: Crying spells; loss of appetite; feelings of hopelessness or loss of control; fear of touching the baby; over concern or no concern at all about the baby; little or no concern about your own appearance/caring for yourself; and/or inability to sleep or excessive sleeping. Contact your Obstetrician if you are experiencing any of these symptoms     PREVENTING SHAKEN BABY  If you are angry or stressed, PUT THE BABY IN THE CRIB, step away, take some deep breaths, and wait until you are calm to care for the baby. DO NOT SHAKE THE BABY. You are not alone, call a supporter for help.  · Crisis [facility] Center 24/7 crisis call line (106-306-7981) or (1-993.360.4711)  · You can also text them, text \"ANSWER\" (094132)      "

## 2021-08-28 LAB
T3FREE SERPL-MCNC: 2.3 PG/ML (ref 2–4.4)
T4 FREE SERPL-MCNC: 1.1 NG/DL (ref 0.82–1.77)
TSH SERPL DL<=0.005 MIU/L-ACNC: 1.79 UIU/ML (ref 0.45–4.5)

## 2021-08-31 ENCOUNTER — TELEMEDICINE (OUTPATIENT)
Dept: ENDOCRINOLOGY | Facility: MEDICAL CENTER | Age: 31
End: 2021-08-31
Attending: NURSE PRACTITIONER
Payer: COMMERCIAL

## 2021-08-31 DIAGNOSIS — E53.8 VITAMIN B 12 DEFICIENCY: ICD-10-CM

## 2021-08-31 DIAGNOSIS — E03.9 HYPOTHYROIDISM (ACQUIRED): ICD-10-CM

## 2021-08-31 DIAGNOSIS — E55.9 VITAMIN D DEFICIENCY: ICD-10-CM

## 2021-08-31 PROCEDURE — 99214 OFFICE O/P EST MOD 30 MIN: CPT | Mod: 95 | Performed by: NURSE PRACTITIONER

## 2021-08-31 PROCEDURE — 999999 HB NO CHARGE: Mod: 95 | Performed by: NURSE PRACTITIONER

## 2021-08-31 NOTE — PROGRESS NOTES
Chief Complaint: Follow up for Hashimoto's Hypothyroidism, Vitamin D Deficiency & Vitamin B Deficiency.  Patient was presented for a telehealth consultation via secure and encrypted videoconferencing technology. This encounter was conducted via Zoom . Verbal consent was obtained. Patient's identity was verified.    HPI:     Osito Lange is a 30 y.o. female for continued evaluation & treatment of the following:     Pt is had spontaneous delivery on 08/22/2021.    1. Hashimoto's Hypothyroidism    After delivery patient to prepregnancy thyroid hormone dose.  Liothyronine 5 mcg per day and Levothyroxine levothyroxine 112 mcg.   Since last visit patient reports feeling well, overall.  Pt reports good compliance and denies missing any daily doses.     Pt takes thyroid hormone 1 hour before breakfast.     Pt denies taking any iron, calcium supplements or antacids.      Hx Nodule on right side neck since she was a teenager.   Has never had an US of the Neck.   Denies tenderness or any new nodules/lumps.     Denies feeling anxious, tremors.     Defers a Behavorial Health referral currently.   Is having issues with PP, and milk supply issue.      Ref. Range 8/27/2021 10:34   TSH Latest Ref Range: 0.450 - 4.500 uIU/mL 1.790   Free T-4 Latest Ref Range: 0.82 - 1.77 ng/dL 1.10   T3,Free Latest Ref Range: 2.0 - 4.4 pg/mL 2.3         2. Vitamin D Deficiency  Currently on 4000 IU per day.     Ref. Range 1/28/2021 11:32   25-Hydroxy   Vitamin D 25 Latest Ref Range: 30 - 100 ng/mL 42       3. Vitamin B12 Deficiency  Currently prenatal vitamin with Folic Acid and Vitamin B12 800mcg, with MVI and Omega 3s.   Gluten free diet.    Ref. Range 1/28/2021 11:32   Vitamin B12 -True Cobalamin Latest Ref Range: 211 - 911 pg/mL 764       Patient's medications, allergies, and social histories were reviewed and updated as appropriate.      ROS:     CONS:     No fever, no chills   EYES:     No diplopia, no blurry vision   CV:           No  chest pain, no palpitations   PULM:     No SOB, no cough, no hemoptysis.   GI:            No nausea, no vomiting, no diarrhea, no constipation   ENDO:     No polyuria, no polydipsia, no heat intolerance, no cold intolerance       Past Medical History:  Problem List:  2021-03: Encounter for supervision of normal first pregnancy in third   trimester  2020-01: Lip swelling  2020-01: Skin irritation  2019-09: Hypothyroidism, acquired, autoimmune  2018-11: Encounter for other contraceptive management  2018-11: Annual physical exam  2017-09: Hashimoto's thyroiditis  2017-09: Vitamin D deficiency      Past Surgical History:  History reviewed. No pertinent surgical history.     Allergies:  Penicillins     Social History:  Social History     Tobacco Use   • Smoking status: Never Smoker   • Smokeless tobacco: Never Used   Vaping Use   • Vaping Use: Never used   Substance Use Topics   • Alcohol use: Not Currently     Comment: rarely   • Drug use: No        Family History:   family history is not on file.      PHYSICAL EXAM:   Vital signs: /70, heart rate 84, room air sat 98%.  GENERAL: Well-developed, well-nourished in no apparent distress.   EYE:  No ocular asymmetry, PERRLA  HENT: Pink, moist mucous membranes.    NECK: No thyromegaly.   CARDIOVASCULAR:  No murmurs  LUNGS: Clear breath sounds  ABDOMEN: Soft, nontender   EXTREMITIES: No clubbing, cyanosis, or edema.   NEUROLOGICAL: No gross focal motor abnormalities   LYMPH: No cervical adenopathy seen.   SKIN: No rashes, lesions.     ASSESSMENT/PLAN:     1. Hypothyroidism due to Hashimoto's thyroiditis  Stable.  Continue taking Liothyronine 5 mcg per day and levothyroxine 112 mcg daily.    2. Vitamin D deficiency  Stable.  Recommend increasing vitamin D to 4000 IU daily.    3. Vitamin B 12 deficiency  Stable.  Continue taking prenatal vitamin with folic acid and B supplementation.     Repeat labs 1 week prior to follow up appointment.   Follow up appointment in 3  months.     Thank you kindly for allowing me to participate in the thyroid care plan for this patient.    Niesha Meza, APRN  12/10/2021    CC:   Dm Irvin P.A.-C.

## 2021-10-08 ENCOUNTER — POST PARTUM (OUTPATIENT)
Dept: OBGYN | Facility: CLINIC | Age: 31
End: 2021-10-08
Payer: COMMERCIAL

## 2021-10-08 PROCEDURE — 90050 PR POSTPARTUM VISIT: CPT | Performed by: OBSTETRICS & GYNECOLOGY

## 2021-10-08 ASSESSMENT — EDINBURGH POSTNATAL DEPRESSION SCALE (EPDS)
I HAVE BEEN ANXIOUS OR WORRIED FOR NO GOOD REASON: NO, NOT AT ALL
TOTAL SCORE: 0
I HAVE LOOKED FORWARD WITH ENJOYMENT TO THINGS: AS MUCH AS I EVER DID
I HAVE FELT SCARED OR PANICKY FOR NO GOOD REASON: NO, NOT AT ALL
I HAVE BEEN SO UNHAPPY THAT I HAVE BEEN CRYING: NO, NEVER
I HAVE FELT SAD OR MISERABLE: NO, NOT AT ALL
I HAVE BEEN ABLE TO LAUGH AND SEE THE FUNNY SIDE OF THINGS: AS MUCH AS I ALWAYS COULD
THE THOUGHT OF HARMING MYSELF HAS OCCURRED TO ME: NEVER
I HAVE BEEN SO UNHAPPY THAT I HAVE HAD DIFFICULTY SLEEPING: NOT AT ALL
I HAVE BLAMED MYSELF UNNECESSARILY WHEN THINGS WENT WRONG: NO, NEVER
THINGS HAVE BEEN GETTING ON TOP OF ME: NO, I HAVE BEEN COPING AS WELL AS EVER

## 2021-10-08 NOTE — NON-PROVIDER
Pt here today for postpartum exam.  Delivery Date 8/22/2021   Operation Date: 8/22/2021  Currently: pt is bleeding not sure on LMP  BCM: Not currently   LMP: Unknown   Good ph:[phone]   Weight: 169lb  BP: L Arm 106/68

## 2021-10-28 NOTE — PROGRESS NOTES
Chief complaint: Postpartum visit    Osito Rivera is a 31 y.o. female who presents for her Postpartum Exam      HPI Comments: Pt presents for postpartum exam s/p vaginal, spontaneous on August 22, 2021. Patient is without complaints.  Baby doing well.  Breastfeeding.  No depression/anxiety.  Not sexually active.  Lochia resolved.  Desires nothing for contraception.    Review of Systems:   Pertinent positives documented in HPI and all other systems reviewed & are negative    Last pap: February 2021.  Normal    Physical exam:   Vital measurements:  There were no vitals taken for this visit.  There is no height or weight on file to calculate BMI. (Goal BMI>18 <25)  Nursing note and vitals reviewed.  Gen: She is oriented to person, place, and time. She appears well-developed and well-nourished. No distress.   Heart: Heart regular rate and rhythm  Lungs: clear to auscultation bilaterally  Abdomen: soft, nontender, nondistended, no rebound/guarding  Extremities: nontender, no clubbing, cyanosis or edema  Pelvic: Normal external female genitalia, well-healed perineum from delivery, cervix is normal, uterus approximately 8-10 weeks in size non tender, adnexa bilaterally normal with no dominant masses    A/P: Postpartum status post vaginal, spontaneous  Doing well without complaints  Continue prenatal vitamins if breast feeding  May resume normal activities including exercise, tampons, and intercourse  Contraception none.  Patient aware pregnancy may occur if no contraception is used.  She reports she would like to achieve pregnancy in the near future.  Recommended prenatal vitamins as well  Follow up in February 2022 for annual exam or sooner as needed    All questions answered   No

## 2021-12-09 LAB
T3FREE SERPL-MCNC: 3.6 PG/ML (ref 2–4.4)
T4 FREE SERPL-MCNC: 1.69 NG/DL (ref 0.82–1.77)
TSH SERPL DL<=0.005 MIU/L-ACNC: 0.04 UIU/ML (ref 0.45–4.5)

## 2021-12-10 ENCOUNTER — TELEMEDICINE (OUTPATIENT)
Dept: ENDOCRINOLOGY | Facility: MEDICAL CENTER | Age: 31
End: 2021-12-10
Attending: NURSE PRACTITIONER
Payer: COMMERCIAL

## 2021-12-10 DIAGNOSIS — E06.3 HYPOTHYROIDISM, ACQUIRED, AUTOIMMUNE: ICD-10-CM

## 2021-12-10 DIAGNOSIS — E55.9 VITAMIN D DEFICIENCY: ICD-10-CM

## 2021-12-10 DIAGNOSIS — E53.8 VITAMIN B 12 DEFICIENCY: ICD-10-CM

## 2021-12-10 PROCEDURE — 99214 OFFICE O/P EST MOD 30 MIN: CPT | Mod: 95 | Performed by: NURSE PRACTITIONER

## 2021-12-10 NOTE — PROGRESS NOTES
Chief Complaint: Follow up for Hashimoto's Hypothyroidism, Vitamin D Deficiency & Vitamin B Deficiency.  Patient was presented for a telehealth consultation via secure and encrypted videoconferencing technology. This encounter was conducted via Zoom . Verbal consent was obtained. Patient's identity was verified.    HPI:     Osito Lange is a 31 y.o. female for continued evaluation & treatment of the followin. Hashimoto's Hypothyroidism    Liothyronine 5 mcg per day and [id number] mcg daily since her delivery on 2021.  Since last visit patient reports feeling well, overall.  Her  is now almost 4 months old and things are going well.  Pt reports good compliance and denies missing any daily doses.     Pt takes thyroid hormone 1 hour before breakfast.     Pt denies taking any iron, calcium supplements or antacids.      Hx Nodule on right side neck since she was a teenager.   Has never had an US of the Neck.   Denies tenderness or any new nodules/lumps.     Patient denies anxiousness, palpitations and tremors.  Free T4 is within normal limits despite the suppression of the TSH at this time.     Ref. Range 2021 08:28   TSH Latest Ref Range: 0.450 - 4.500 uIU/mL 0.039 (L)   Free T-4 Latest Ref Range: 0.82 - 1.77 ng/dL 1.69   T3,Free Latest Ref Range: 2.0 - 4.4 pg/mL 3.6         2. Vitamin D Deficiency  Currently on 2000 IU per day.     Ref. Range 2021 11:32   25-Hydroxy   Vitamin D 25 Latest Ref Range: 30 - 100 ng/mL 42       3. Vitamin B Deficiency  Currently prenatal vitamin with Folic Acid and Vitamin B12.    Gluten free diet.    Ref. Range 2021 11:32   Vitamin B12 -True Cobalamin Latest Ref Range: 211 - 911 pg/mL 764       Patient's medications, allergies, and social histories were reviewed and updated as appropriate.      ROS:     CONS:     No fever, no chills   EYES:     No diplopia, no blurry vision   CV:           No chest pain, no palpitations   PULM:     No SOB,  no cough, no hemoptysis.   GI:            No nausea, no vomiting, no diarrhea, no constipation   ENDO:     No polyuria, no polydipsia, no heat intolerance, no cold intolerance       Past Medical History:  Problem List:  2021-10: Encounter for postpartum visit  2021-03: Encounter for supervision of normal first pregnancy in third   trimester  2020-01: Lip swelling  2020-01: Skin irritation  2019-09: Hypothyroidism, acquired, autoimmune  2018-11: Encounter for other contraceptive management  2018-11: Annual physical exam  2017-09: Hashimoto's thyroiditis  2017-09: Vitamin D deficiency      Past Surgical History:  No past surgical history on file.     Allergies:  Penicillins     Social History:  Social History     Tobacco Use   • Smoking status: Never Smoker   • Smokeless tobacco: Never Used   Vaping Use   • Vaping Use: Never used   Substance Use Topics   • Alcohol use: Not Currently     Comment: rarely   • Drug use: No        Family History:   family history is not on file.      PHYSICAL EXAM:   Vital signs: /70, heart rate 84, room air sat 98%.  GENERAL: Well-developed, well-nourished in no apparent distress.   EYE:  No ocular asymmetry, PERRLA  HENT: Pink, moist mucous membranes.    NECK: No thyromegaly.   CARDIOVASCULAR:  No murmurs  LUNGS: Clear breath sounds  ABDOMEN: Soft, nontender   EXTREMITIES: No clubbing, cyanosis, or edema.   NEUROLOGICAL: No gross focal motor abnormalities   LYMPH: No cervical adenopathy seen.   SKIN: No rashes, lesions.     ASSESSMENT/PLAN:     1. Hypothyroidism due to Hashimoto's thyroiditis  Stable.  Continue taking Liothyronine 5 mcg per day and levothyroxine 112 mcg daily.  Lab assay represent suppression of TSH but free T4 is within normal range and patient is asymptomatic at this time.    2. Vitamin D deficiency  Stable.  Recommend increasing vitamin D to 4000 IU daily.    3. Vitamin B 12 deficiency  Stable.  Continue taking prenatal vitamin with folic acid and B  supplementation.     Repeat labs 1 week prior to follow up appointment.   Follow up appointment in 3 months    Thank you kindly for allowing me to participate in the thyroid care plan for this patient.    Niesha Meza, APRN  12/10/2021    CC:   Dm Irvin P.A.-C.

## 2022-01-17 ENCOUNTER — TELEMEDICINE (OUTPATIENT)
Dept: MEDICAL GROUP | Facility: MEDICAL CENTER | Age: 32
End: 2022-01-17
Payer: COMMERCIAL

## 2022-01-17 VITALS — HEIGHT: 67 IN | WEIGHT: 155 LBS | BODY MASS INDEX: 24.33 KG/M2

## 2022-01-17 DIAGNOSIS — B35.4 RINGWORM OF BODY: ICD-10-CM

## 2022-01-17 PROBLEM — R21 RASH: Status: ACTIVE | Noted: 2022-01-17

## 2022-01-17 PROCEDURE — 99213 OFFICE O/P EST LOW 20 MIN: CPT | Mod: 95 | Performed by: FAMILY MEDICINE

## 2022-01-17 ASSESSMENT — ENCOUNTER SYMPTOMS
PALPITATIONS: 0
WEIGHT LOSS: 0
SHORTNESS OF BREATH: 0
WEAKNESS: 0
ABDOMINAL PAIN: 0
CHILLS: 0
MYALGIAS: 0
FEVER: 0
DEPRESSION: 0
COUGH: 0
DIZZINESS: 0

## 2022-01-17 ASSESSMENT — FIBROSIS 4 INDEX: FIB4 SCORE: 0.71

## 2022-01-17 ASSESSMENT — PATIENT HEALTH QUESTIONNAIRE - PHQ9: CLINICAL INTERPRETATION OF PHQ2 SCORE: 0

## 2022-01-17 NOTE — ASSESSMENT & PLAN NOTE
Noticed a skin lesion on 12/13 thinking it was a burn as it was circular and raised.  Left it alone for about approximately 2 weeks and noticed that increase in size and started become flaky along the edges.  Started to use Lotrimin ringworm cream but did not notice any improvement in the lesion.  Lesion started to spread to other locations.  On 01/14 started to use Lotrimin ultra and has noticed an improvement in some of her lesions in which they are starting to go away.  Continues to use it on 2 other lesions and is unsure if it is working on the 2 other spots.  Has a baby at home and wants to ensure that she is being cleared up and not spreading it to her child.

## 2022-01-17 NOTE — PROGRESS NOTES
Telemedicine Visit: Established Patient     This encounter was conducted via Zoom.   Verbal consent was obtained. Patient's identity was verified.  Patient is calling from [place]    Subjective:     Chief Complaint   Patient presents with   • Tinea     mid december        Osito Rivera is a 31 y.o. female presenting for evaluation and management of:    Rash  Noticed a skin lesion on 12/13 thinking it was a burn as it was circular and raised.  Left it alone for about approximately 2 weeks and noticed that increase in size and started become flaky along the edges.  Started to use Lotrimin ringworm cream but did not notice any improvement in the lesion.  Lesion started to spread to other locations.  On 01/14 started to use Lotrimin ultra and has noticed an improvement in some of her lesions in which they are starting to go away.  Continues to use it on 2 other lesions and is unsure if it is working on the 2 other spots.  Has a baby at home and wants to ensure that she is being cleared up and not spreading it to her child.      Review of Systems   Constitutional: Negative for chills, fever, malaise/fatigue and weight loss.   Respiratory: Negative for cough and shortness of breath.    Cardiovascular: Negative for chest pain and palpitations.   Gastrointestinal: Negative for abdominal pain.   Genitourinary: Negative.    Musculoskeletal: Negative for myalgias.   Skin: Positive for rash (Ringworm).   Neurological: Negative for dizziness and weakness.   Psychiatric/Behavioral: Negative for depression.        Allergies   Allergen Reactions   • Penicillins Hives       Current medicines (including changes today)  Current Outpatient Medications   Medication Sig Dispense Refill   • ibuprofen (MOTRIN) 800 MG Tab Take 1 Tablet by mouth every 8 hours as needed (For cramping after delivery; do not give if patient is receiving ketorolac (Toradol)). (Patient not taking: Reported on 10/8/2021) 60 Tablet 0   • liothyronine  "(CYTOMEL) 5 MCG Tab Take 1 tablet by mouth every day. 90 tablet 3   • levothyroxine (SYNTHROID) 112 MCG Tab TAKE 1 TABLET BY MOUTH EVERY DAY IN THE MORNING ON AN EMPTY STOMACH, NOT ON FORMULARY 90 tablet 3   • Cholecalciferol (VITAMIN D-3) 125 MCG (5000 UT) Tab Take  by mouth.     • Prenatal MV-Min-Fe Fum-FA-DHA (PRENATAL 1 PO) Take  by mouth.       No current facility-administered medications for this visit.       Patient Active Problem List    Diagnosis Date Noted   • Rash 01/17/2022   • Encounter for postpartum visit 10/08/2021   • Encounter for supervision of normal first pregnancy in third trimester 03/17/2021   • Lip swelling 01/30/2020   • Skin irritation 01/30/2020   • Hypothyroidism, acquired, autoimmune 09/06/2019   • Hashimoto's thyroiditis 09/15/2017   • Vitamin D deficiency 09/15/2017       History reviewed. No pertinent family history.    She  has a past medical history of Asthma, Hypotension, and Thyroid disease. She also has no past medical history of Addisons disease ([facility]), Adrenal disorder ([facility]), Allergy, Anemia, Anxiety, Arrhythmia, Arthritis, Blood transfusion without reported diagnosis, Cancer ([facility]), Cataract, CHF (congestive heart failure) ([facility]), Clotting disorder ([facility]), COPD (chronic obstructive pulmonary disease) ([facility]), Cushings syndrome ([facility]), Depression, Diabetes ([facility]), Diabetic neuropathy (HCC), GERD (gastroesophageal reflux disease), Glaucoma, Goiter, Head ache, Heart attack (HCC), Heart murmur, HIV (human immunodeficiency virus infection) ([facility]), Hyperlipidemia, Hypertension, IBD (inflammatory bowel disease), Kidney disease, Meningitis, Migraine, Muscle disorder, Osteoporosis, Parathyroid disorder (HCC), Pituitary disease (HCC), Pulmonary emphysema (HCC), Seizure (HCC), Sickle cell disease (HCC), Stroke (HCC), Substance abuse (HCC), Tuberculosis, or Urinary tract infection.  She  has no past surgical history on file.       Objective:   Ht 1.702 m (5' 7\")   Wt 70.3 kg (155 lb)   BMI " 24.28 kg/m²     Physical Exam:  Constitutional: Alert, no distress, well-groomed.  Skin: No rashes in visible areas.  Eye: Round. Conjunctiva clear, lids normal. No icterus.   ENMT: Lips pink without lesions, good dentition, moist mucous membranes. Phonation normal.  Neck: No masses, no thyromegaly. Moves freely without pain.  CV: Pulse as reported by patient  Respiratory: Unlabored respiratory effort, no cough or audible wheeze  Psych: Alert and oriented x3, normal affect and mood.     Labs:   No pertinent labs to review    Assessment and Plan:   The following treatment plan was discussed:     1. Ringworm of body  Acute.  I recommended that the patient continue to use the Lotrimin ultra as it was helping to clear out some of her lesions.  I did recommend that if she has persistent lesion she can always add a topical steroid cream to help decrease some of the inflammation.  Patient states that she had a topical steroid cream at home and would like to use that.  I did recommend that if the lesions do not improve to reach back out to her general practitioner for further treatment.  Did recommend if she finds that Lotrimin ultra is not beneficial she can always use terbinafine as an alternative treatment.      Follow-up: Return if symptoms worsen or fail to improve.    Advised to set up an appointment with me sooner if symptoms continue to worsen and/or do not improve.  Discussed with the patient on when to seek out treatment at the emergency department.

## 2022-02-04 DIAGNOSIS — E06.3 HASHIMOTO'S THYROIDITIS: ICD-10-CM

## 2022-02-04 RX ORDER — LEVOTHYROXINE SODIUM 112 UG/1
TABLET ORAL
Qty: 90 TABLET | Refills: 3 | Status: SHIPPED | OUTPATIENT
Start: 2022-02-04 | End: 2022-03-02 | Stop reason: SDUPTHER

## 2022-02-04 NOTE — TELEPHONE ENCOUNTER
Received request via: Pharmacy     Was the patient seen in the last year in this department? Yes    Does the patient have an active prescription (recently filled or refills available) for medication(s) requested? No     Pharmacy Notes:   [facility] Requesting New Rx for: L-Thyroxine (Synthroid) Tabs

## 2022-03-02 DIAGNOSIS — E06.3 HASHIMOTO'S THYROIDITIS: ICD-10-CM

## 2022-03-02 RX ORDER — LEVOTHYROXINE SODIUM 112 UG/1
TABLET ORAL
Qty: 90 TABLET | Refills: 3 | Status: SHIPPED | OUTPATIENT
Start: 2022-03-02 | End: 2023-01-06

## 2022-03-02 RX ORDER — LEVOTHYROXINE SODIUM 112 UG/1
TABLET ORAL
Qty: 90 TABLET | Refills: 3 | Status: SHIPPED | OUTPATIENT
Start: 2022-03-02 | End: 2022-03-02 | Stop reason: SDUPTHER

## 2022-04-27 LAB
T3FREE SERPL-MCNC: 2.9 PG/ML (ref 2–4.4)
T4 FREE SERPL-MCNC: 1.44 NG/DL (ref 0.82–1.77)
TSH SERPL DL<=0.005 MIU/L-ACNC: 0.55 UIU/ML (ref 0.45–4.5)

## 2022-05-03 ENCOUNTER — TELEMEDICINE (OUTPATIENT)
Dept: ENDOCRINOLOGY | Facility: MEDICAL CENTER | Age: 32
End: 2022-05-03
Attending: NURSE PRACTITIONER
Payer: COMMERCIAL

## 2022-05-03 DIAGNOSIS — E53.8 VITAMIN B 12 DEFICIENCY: ICD-10-CM

## 2022-05-03 DIAGNOSIS — E55.9 VITAMIN D DEFICIENCY: ICD-10-CM

## 2022-05-03 DIAGNOSIS — E03.9 HYPOTHYROIDISM (ACQUIRED): ICD-10-CM

## 2022-05-03 PROCEDURE — 99214 OFFICE O/P EST MOD 30 MIN: CPT | Mod: 95 | Performed by: NURSE PRACTITIONER

## 2022-05-03 NOTE — PROGRESS NOTES
Hello hello test hello Chief Complaint: Follow up for Hashimoto's Hypothyroidism, Vitamin D Deficiency & Vitamin B Deficiency.  Patient was presented for a telehealth consultation via secure and encrypted videoconferencing technology. This encounter was conducted via Zoom . Verbal consent was obtained. Patient's identity was verified.    HPI:     Osito Lange is a 32 y.o. female for continued evaluation & treatment of the following:     Hashimoto's Hypothyroidism    Patient reports overall she is doing well since her last appointment.  She found out 3 weeks ago that she is pregnant with her second child.  She is approximately 6 weeks along at this time.  She has increased her thyroid hormone level approximately 3 weeks ago.  Currently taking liothyronine 5 mcg per day and Levothyroxine 112 mcg with double levothyroxine dosing twice a week.   She  Pt reports good compliance and denies missing any daily doses.     Pt takes thyroid hormone 1 hour before breakfast.     Pt denies taking any iron, calcium supplements or antacids.      Hx Nodule on right side neck since she was a teenager.   Has never had an US of the Neck.   Denies tenderness or any new nodules/lumps.     Patient denies anxiousness, palpitations and tremors.  Free T4 is within normal limits despite the suppression of the TSH at this time.     Ref. Range 4/26/2022 08:40   TSH Latest Ref Range: 0.450 - 4.500 uIU/mL 0.547   Free T-4 Latest Ref Range: 0.82 - 1.77 ng/dL 1.44   T3,Free Latest Ref Range: 2.0 - 4.4 pg/mL 2.9         Vitamin D Deficiency  Currently on 2000 IU per day.     Ref. Range 1/28/2021 11:32   25-Hydroxy   Vitamin D 25 Latest Ref Range: 30 - 100 ng/mL 42         Vitamin B Deficiency  Currently prenatal vitamin with Folic Acid and Vitamin B12.    Gluten free diet.    Ref. Range 1/28/2021 11:32   Vitamin B12 -True Cobalamin Latest Ref Range: 211 - 911 pg/mL 764       Patient's medications, allergies, and social histories were reviewed  and updated as appropriate.      ROS:     CONS:     No fever, no chills   EYES:     No diplopia, no blurry vision   CV:           No chest pain, no palpitations   PULM:     No SOB, no cough, no hemoptysis.   GI:            No nausea, no vomiting, no diarrhea, no constipation   ENDO:     No polyuria, no polydipsia, no heat intolerance, no cold intolerance       Past Medical History:  Problem List:  2022-01: Rash  2021-10: Encounter for postpartum visit  2021-03: Encounter for supervision of normal first pregnancy in third   trimester  2020-01: Lip swelling  2020-01: Skin irritation  2019-09: Hypothyroidism, acquired, autoimmune  2018-11: Encounter for other contraceptive management  2018-11: Annual physical exam  2017-09: Hashimoto's thyroiditis  2017-09: Vitamin D deficiency      Past Surgical History:  No past surgical history on file.     Allergies:  Penicillins     Social History:  Social History     Tobacco Use   • Smoking status: Never Smoker   • Smokeless tobacco: Never Used   Vaping Use   • Vaping Use: Never used   Substance Use Topics   • Alcohol use: Not Currently     Comment: rarely   • Drug use: No        Family History:   family history is not on file.      PHYSICAL EXAM:   Vital signs: /70, heart rate 84, room air sat 98%.  GENERAL: Well-developed, well-nourished in no apparent distress.   EYE:  No ocular asymmetry, PERRLA  HENT: Pink, moist mucous membranes.    NECK: No thyromegaly.   CARDIOVASCULAR:  No murmurs  LUNGS: Clear breath sounds  ABDOMEN: Soft, nontender   EXTREMITIES: No clubbing, cyanosis, or edema.   NEUROLOGICAL: No gross focal motor abnormalities   LYMPH: No cervical adenopathy seen.   SKIN: No rashes, lesions.     ASSESSMENT/PLAN:     1. Hypothyroidism due to Hashimoto's thyroiditis  Stable.  Continue taking Liothyronine 5 mcg per day and levothyroxine 112 mcg 5 days a week with double the dose the other 2 days..      2. Vitamin D deficiency  Stable.  Recommend increasing vitamin  D to 4000 IU daily.    3. Vitamin B 12 deficiency  Stable.  Continue taking prenatal vitamin with folic acid and B supplementation.     Repeat labs 1 week prior to follow up appointment.   Follow up appointment in 6 weeks.    Thank you kindly for allowing me to participate in the thyroid care plan for this patient.    Niesha Meza, APRN  05/03/2022    CC:   Dm Irvin P.A.-C.

## 2022-05-23 ENCOUNTER — OFFICE VISIT (OUTPATIENT)
Dept: MEDICAL GROUP | Facility: MEDICAL CENTER | Age: 32
End: 2022-05-23
Payer: COMMERCIAL

## 2022-05-23 VITALS
OXYGEN SATURATION: 96 % | HEART RATE: 98 BPM | HEIGHT: 67 IN | TEMPERATURE: 98.6 F | BODY MASS INDEX: 23.91 KG/M2 | SYSTOLIC BLOOD PRESSURE: 94 MMHG | WEIGHT: 152.34 LBS | DIASTOLIC BLOOD PRESSURE: 40 MMHG

## 2022-05-23 DIAGNOSIS — E06.3 HASHIMOTO'S THYROIDITIS: ICD-10-CM

## 2022-05-23 DIAGNOSIS — Z00.00 ANNUAL PHYSICAL EXAM: ICD-10-CM

## 2022-05-23 DIAGNOSIS — E06.3 HYPOTHYROIDISM, ACQUIRED, AUTOIMMUNE: ICD-10-CM

## 2022-05-23 DIAGNOSIS — Z3A.09 9 WEEKS GESTATION OF PREGNANCY: ICD-10-CM

## 2022-05-23 PROBLEM — Z34.03 ENCOUNTER FOR SUPERVISION OF NORMAL FIRST PREGNANCY IN THIRD TRIMESTER: Status: RESOLVED | Noted: 2021-03-17 | Resolved: 2022-05-23

## 2022-05-23 PROBLEM — R21 RASH: Status: RESOLVED | Noted: 2022-01-17 | Resolved: 2022-05-23

## 2022-05-23 PROCEDURE — 99395 PREV VISIT EST AGE 18-39: CPT | Performed by: PHYSICIAN ASSISTANT

## 2022-05-23 ASSESSMENT — FIBROSIS 4 INDEX: FIB4 SCORE: 0.73

## 2022-05-23 NOTE — ASSESSMENT & PLAN NOTE
This is a pleasant 32-year-old female here today for an annual physical.  No new complaints today.  Did have coxsackievirus the other week.  Had rash on her palms.  Symptoms have improved.  Her daughter had it originally.  She is 9 months of age.  She is requesting a referral to endocrinology.  Sees [facility] and will have an appointment soon with a new endocrinologist.  Is currently on Cytomel and Synthroid.  She is also 9 weeks pregnant this week.  Baby is due in December.  She is currently on a prenatal vitamin.

## 2022-05-23 NOTE — PROGRESS NOTES
Subjective:   Osito Rivera is a 32 y.o. female here today for annual physical.    Annual physical exam  This is a pleasant 32-year-old female here today for an annual physical.  No new complaints today.  Did have coxsackievirus the other week.  Had rash on her palms.  Symptoms have improved.  Her daughter had it originally.  She is 9 months of age.  She is requesting a referral to endocrinology.  Sees [facility] and will have an appointment soon with a new endocrinologist.  Is currently on Cytomel and Synthroid.  She is also 9 weeks pregnant this week.  Baby is due in December.  She is currently on a prenatal vitamin.       Current medicines (including changes today)  Current Outpatient Medications   Medication Sig Dispense Refill   • levothyroxine (SYNTHROID) 112 MCG Tab TAKE 1 TABLET BY MOUTH EVERY DAY IN THE MORNING ON AN EMPTY STOMACH, NOT ON FORMULARY 90 Tablet 3   • liothyronine (CYTOMEL) 5 MCG Tab Take 1 tablet by mouth every day. 90 tablet 3   • Cholecalciferol (VITAMIN D-3) 125 MCG (5000 UT) Tab Take  by mouth.     • Prenatal MV-Min-Fe Fum-FA-DHA (PRENATAL 1 PO) Take  by mouth.       No current facility-administered medications for this visit.     She  has a past medical history of Asthma, Hypotension, and Thyroid disease.    She has no past medical history of Addisons disease ([facility]), Adrenal disorder ([facility]), Allergy, Anemia, Anxiety, Arrhythmia, Arthritis, Blood transfusion without reported diagnosis, Cancer ([facility]), Cataract, CHF (congestive heart failure) ([facility]), Clotting disorder ([facility]), COPD (chronic obstructive pulmonary disease) ([facility]), Cushings syndrome ([facility]), Depression, Diabetes ([facility]), Diabetic neuropathy ([facility]), GERD (gastroesophageal reflux disease), Glaucoma, Goiter, Head ache, Heart attack ([facility]), Heart murmur, HIV (human immunodeficiency virus infection) ([facility]), Hyperlipidemia, Hypertension, IBD (inflammatory bowel disease), Kidney disease, Meningitis, Migraine, Muscle disorder, Osteoporosis,  "Parathyroid disorder (HCC), Pituitary disease (HCC), Pulmonary emphysema (HCC), Seizure (HCC), Sickle cell disease (HCC), Stroke (HCC), Substance abuse (HCC), Tuberculosis, or Urinary tract infection.    Social History and Family History were reviewed and updated.    ROS   No chest pain, no shortness of breath, no abdominal pain and all other systems were reviewed and are negative.       Objective:     BP (!) 94/40 (BP Location: Left arm, Patient Position: Sitting, BP Cuff Size: Adult)   Pulse 98   Temp 37 °C (98.6 °F) (Temporal)   Ht 1.702 m (5' 7\")   Wt 69.1 kg (152 lb 5.4 oz)   SpO2 96%  Body mass index is 23.86 kg/m².   Physical Exam:  Constitutional: Alert, no distress.  Skin: Warm, dry, good turgor, no rashes in visible areas.  Eye: Equal, round and reactive, conjunctiva clear, lids normal.  ENMT: Lips without lesions, good dentition, oropharynx clear.  Neck: Trachea midline, no masses.   Lymph: No cervical or supraclavicular lymphadenopathy  Respiratory: Unlabored respiratory effort, lungs appear clear, no wheezes.  Cardiovascular: Regular rate and rhythm.  Psych: Alert and oriented x3, normal affect and mood.        Assessment and Plan:   The following treatment plan was discussed    1. Annual physical exam  Generally healthy female.  Defer labs to endocrinology and OB.    2. 9 weeks gestation of pregnancy  Wished her well in her pregnancy.  Follow-up with obstetrics.  Continue prenatal vitamin.    3. Hypothyroidism, acquired, autoimmune  Chronic condition.  Stable.  Referral to endocrinology done.  Continue medications as directed.  Defer labs to endocrinology.  - Referral to Endocrinology      Followup: Return in about 1 year (around 5/23/2023), or if symptoms worsen or fail to improve.    Please note that this dictation was created using voice recognition software. I have made every reasonable attempt to correct obvious errors, but I expect that there are errors of grammar and possibly content that I " did not discover before finalizing the note.

## 2022-06-16 ENCOUNTER — GYNECOLOGY VISIT (OUTPATIENT)
Dept: OBGYN | Facility: CLINIC | Age: 32
End: 2022-06-16
Payer: COMMERCIAL

## 2022-06-16 VITALS — BODY MASS INDEX: 24.01 KG/M2 | DIASTOLIC BLOOD PRESSURE: 73 MMHG | SYSTOLIC BLOOD PRESSURE: 124 MMHG | WEIGHT: 153.3 LBS

## 2022-06-16 DIAGNOSIS — N93.8 DUB (DYSFUNCTIONAL UTERINE BLEEDING): ICD-10-CM

## 2022-06-16 DIAGNOSIS — N93.8 DYSFUNCTIONAL UTERINE BLEEDING: ICD-10-CM

## 2022-06-16 LAB
INT CON NEG: NEGATIVE
INT CON POS: POSITIVE
POC URINE PREGNANCY TEST: NORMAL

## 2022-06-16 PROCEDURE — 76830 TRANSVAGINAL US NON-OB: CPT | Mod: TC | Performed by: OBSTETRICS & GYNECOLOGY

## 2022-06-16 PROCEDURE — 81025 URINE PREGNANCY TEST: CPT | Performed by: OBSTETRICS & GYNECOLOGY

## 2022-06-16 PROCEDURE — 99213 OFFICE O/P EST LOW 20 MIN: CPT | Mod: 25 | Performed by: OBSTETRICS & GYNECOLOGY

## 2022-06-16 ASSESSMENT — FIBROSIS 4 INDEX: FIB4 SCORE: 0.73

## 2022-06-16 NOTE — PROGRESS NOTES
Cc: Confirmation of pregnancy    HPI:  The patient is a 32 y.o.  at 12 weeks and 1 day gestational age by last menstrual cycle of 2022.  Estimated due date 2022    History of 1 prior vaginal delivery in .  Pregnancy complicated by asthma and hypothyroidism.  Currently on 112 mcg daily with doubl dose every 3 days.    She presents for a confirmation of pregnancy.  She denies  fetal movement,  denies  vaginal bleeding,  denies  leakage of fluid,  denies contractions.   She denies nausea/vomiting, denies headache, and denies dysuria.      Review of systems:  Pertinent positives documented in HPI and all other systems reviewed & are negative    OB History    Para Term  AB Living   1 1 1     1   SAB IAB Ectopic Molar Multiple Live Births           0 1      # Outcome Date GA Lbr Neal/2nd Weight Sex Delivery Anes PTL Lv   1 Term 21 38w4d / 01:26 3.21 kg (7 lb 1.2 oz) F Vag-Spont None N HOA     Past Medical History:   Diagnosis Date   • Asthma    • Hypotension    • Thyroid disease      History reviewed. No pertinent surgical history.  Social History     Socioeconomic History   • Marital status:      Spouse name: Not on file   • Number of children: Not on file   • Years of education: Not on file   • Highest education level: Not on file   Occupational History   • Not on file   Tobacco Use   • Smoking status: Never Smoker   • Smokeless tobacco: Never Used   Vaping Use   • Vaping Use: Never used   Substance and Sexual Activity   • Alcohol use: Not Currently     Comment: rarely   • Drug use: No   • Sexual activity: Yes     Partners: Male     Comment: , one children, pregnant. Planned pregnancy. FOB is involved and supportive.   Other Topics Concern   • Not on file   Social History Narrative   • Not on file     Social Determinants of Health     Financial Resource Strain: Not on file   Food Insecurity: Not on file   Transportation Needs: Not on file   Physical  Activity: Not on file   Stress: Not on file   Social Connections: Not on file   Intimate Partner Violence: Not on file   Housing Stability: Not on file     History reviewed. No pertinent family history.  Allergies:   Allergies as of 06/16/2022 - Reviewed 06/16/2022   Allergen Reaction Noted   • Penicillins Hives 07/04/2014       PE:    /73 (BP Location: Right arm, Patient Position: Sitting, BP Cuff Size: Adult)   Wt 69.5 kg (153 lb 4.8 oz)       General:appears stated age  Head: normocephalic, non-tender  Abdomen: Bowel sounds positive, nondistended, soft, nontender x4, no rebound or guarding. No organomegaly. No masses.  Female GYN: normal external genitalia, no erythema, no discharge  Skin: No rashes, or ulcers or lesions seen  Psychiatric: Patient shows appropriate affect, is alert and oriented x3, intact judgment and insight.    Transvaginal US performed and per my read:    Indication: Dating    Findings: short intrauterine pregnancy @12.4 weeks by CRL. Positive yolk sac. Positive fetal cardiac activity @158 BPM. Right ovary normal. Left Ovary normal. Cervical length 3.23 cm. No free fluid in the cul-de-sac.    Impression: viable IUP @12.4 weeks. EDC by US of December 25, 2022      A/P:   1. DUB (dysfunctional uterine bleeding)  POCT Pregnancy    PRENATAL PANEL 3+HIV+UACXI       1. Spent 30 minutes in face-to-face patient contact in which greater than 50% of that visit was spent in counseling/coordination of care of newly diagnosed pregnancy including medical and surgical options of care.  2. 1st trimester screening for Down Syndrome and neural tube defects discussed.  Patient declined  3.  SAB precautions discussed  4.  F/u in 4 weeks for new OB visit  5.  Increase water intake and encouraged healthy nutrition.  6.  Begin prenatal vitamins.    Patient has appointment with endocrinology later this month.  He is rechecking levels in 2 weeks once again.    Precautions discussed with patient    All  questions answered    Final due date December 28, 2022 consistent with today's ultrasound and last menstrual cycle

## 2022-06-16 NOTE — PROGRESS NOTES
Confirmation of Pregnancy  LMP:323/2022  GA:12w1d  JACKIE:12/28/2022  UPT positive, done in clinic.  Pt states no other complaints for today.  On PNV

## 2022-06-22 LAB
ABO GROUP BLD: ABNORMAL
APPEARANCE UR: ABNORMAL
BACTERIA #/AREA URNS HPF: NORMAL /[HPF]
BASOPHILS # BLD AUTO: 0 X10E3/UL (ref 0–0.2)
BASOPHILS NFR BLD AUTO: 0 %
BILIRUB UR QL STRIP: NEGATIVE
BLD GP AB SCN SERPL QL: NEGATIVE
CASTS URNS MICRO: NORMAL
CASTS URNS QL MICRO: NORMAL /LPF
COLOR UR: YELLOW
CRYSTALS URNS MICRO: NORMAL
EOSINOPHIL # BLD AUTO: 0.1 X10E3/UL (ref 0–0.4)
EOSINOPHIL NFR BLD AUTO: 1 %
EPI CELLS #/AREA URNS HPF: NORMAL /HPF (ref 0–10)
ERYTHROCYTE [DISTWIDTH] IN BLOOD BY AUTOMATED COUNT: 12.5 % (ref 11.7–15.4)
GLUCOSE UR QL STRIP: NEGATIVE
HBV SURFACE AG SERPL QL IA: NEGATIVE
HCT VFR BLD AUTO: 38.6 % (ref 34–46.6)
HGB BLD-MCNC: 13 G/DL (ref 11.1–15.9)
HGB UR QL STRIP: NEGATIVE
HIV 1+2 AB+HIV1 P24 AG SERPL QL IA: NON REACTIVE
IMM GRANULOCYTES # BLD AUTO: 0 X10E3/UL (ref 0–0.1)
IMM GRANULOCYTES NFR BLD AUTO: 0 %
IMMATURE CELLS  115398: ABNORMAL
KETONES UR QL STRIP: NEGATIVE
LEUKOCYTE ESTERASE UR QL STRIP: NEGATIVE
LYMPHOCYTES # BLD AUTO: 2.2 X10E3/UL (ref 0.7–3.1)
LYMPHOCYTES NFR BLD AUTO: 27 %
MCH RBC QN AUTO: 31.6 PG (ref 26.6–33)
MCHC RBC AUTO-ENTMCNC: 33.7 G/DL (ref 31.5–35.7)
MCV RBC AUTO: 94 FL (ref 79–97)
MICRO URNS: ABNORMAL
MICRO URNS: ABNORMAL
MONOCYTES # BLD AUTO: 0.5 X10E3/UL (ref 0.1–0.9)
MONOCYTES NFR BLD AUTO: 6 %
MORPHOLOGY BLD-IMP: ABNORMAL
MUCOUS THREADS URNS QL MICRO: NORMAL
NEUTROPHILS # BLD AUTO: 5.4 X10E3/UL (ref 1.4–7)
NEUTROPHILS NFR BLD AUTO: 66 %
NITRITE UR QL STRIP: NEGATIVE
NRBC BLD AUTO-RTO: ABNORMAL %
PH UR STRIP: 6 [PH] (ref 5–7.5)
PLATELET # BLD AUTO: 269 X10E3/UL (ref 150–450)
PROT UR QL STRIP: NEGATIVE
RBC # BLD AUTO: 4.12 X10E6/UL (ref 3.77–5.28)
RBC #/AREA URNS HPF: NORMAL /HPF (ref 0–2)
RENAL EPI CELLS #/AREA URNS HPF: NORMAL /[HPF]
RH BLD: POSITIVE
RPR SER QL: NON REACTIVE
RUBV IGG SERPL IA-ACNC: 2.53 INDEX
SP GR UR STRIP: 1.01 (ref 1–1.03)
T VAGINALIS URNS QL MICRO: NORMAL
T3FREE SERPL-MCNC: 3 PG/ML (ref 2–4.4)
T4 FREE SERPL-MCNC: 1.46 NG/DL (ref 0.82–1.77)
TSH SERPL DL<=0.005 MIU/L-ACNC: 0.21 UIU/ML (ref 0.45–4.5)
UNIDENT CRYS URNS QL MICRO: NORMAL
URINALYSIS REFLEX  377202: ABNORMAL
URNS CMNT MICRO: NORMAL
UROBILINOGEN UR STRIP-MCNC: 0.2 MG/DL (ref 0.2–1)
WBC # BLD AUTO: 8.3 X10E3/UL (ref 3.4–10.8)
WBC #/AREA URNS HPF: NORMAL /HPF (ref 0–5)
YEAST #/AREA URNS HPF: NORMAL /[HPF]

## 2022-06-28 ENCOUNTER — OFFICE VISIT (OUTPATIENT)
Dept: ENDOCRINOLOGY | Facility: MEDICAL CENTER | Age: 32
End: 2022-06-28
Payer: COMMERCIAL

## 2022-06-28 VITALS
WEIGHT: 160 LBS | SYSTOLIC BLOOD PRESSURE: 92 MMHG | BODY MASS INDEX: 25.11 KG/M2 | HEIGHT: 67 IN | OXYGEN SATURATION: 97 % | HEART RATE: 107 BPM | DIASTOLIC BLOOD PRESSURE: 58 MMHG

## 2022-06-28 DIAGNOSIS — O99.281 HYPOTHYROIDISM DURING PREGNANCY IN FIRST TRIMESTER: ICD-10-CM

## 2022-06-28 DIAGNOSIS — E06.3 HASHIMOTO'S DISEASE: ICD-10-CM

## 2022-06-28 DIAGNOSIS — E03.9 HYPOTHYROIDISM DURING PREGNANCY IN FIRST TRIMESTER: ICD-10-CM

## 2022-06-28 DIAGNOSIS — E55.9 VITAMIN D DEFICIENCY: ICD-10-CM

## 2022-06-28 PROCEDURE — 99214 OFFICE O/P EST MOD 30 MIN: CPT

## 2022-06-28 PROCEDURE — 99211 OFF/OP EST MAY X REQ PHY/QHP: CPT

## 2022-06-28 ASSESSMENT — FIBROSIS 4 INDEX: FIB4 SCORE: 0.46

## 2022-06-28 NOTE — PROGRESS NOTES
Chief Complaint: Consult requested by Dm Irvin P.A.-C. for evaluation of the following conditions    HPI:   Osito Rivera is a 32 y.o. female  here for initial evaluation.      1.  Hypothyroidism during pregnancy first trimester:  Diagnosed with hypothyroidism since age of 17  She initially was treated with Synthroid, then switched to Tirosint and currently she is taking Synthroid again for Tirosint got too expensive      She is currently on Synthroid 112 mcg for 2 days then 3rd day she doubles to 224 mcg then continues same regimen  She is also taking Liothyronine 5 mcg daily    She currently 14 weeks pregnant, this is her second pregnancy    Her dose was changed when she was 3 weeks pregnant-prior to that she was only taking 112 mcg daily and her 5 mcg of Cytomel    She reports good compliance and takes her thyroid hormone daily before breakfast.    She denies taking any iron, calcium supplements or antacids.     In general she is feeling well and at this time she feels clinically stable      She denies lumps or enlargement in the neck.        Latest Reference Range & Units 06/21/22 08:17   TSH 0.450 - 4.500 uIU/mL 0.215 (L)   Free T-4 0.82 - 1.77 ng/dL 1.46   T3,Free 2.0 - 4.4 pg/mL 3.0     2.  Hashimoto's disease:  Reports elevated antibody levels and etiology of her hypothyroidism    3.  Vitamin D deficiency:  Currently taking 4000 IUs OTC daily vitamin D3    Patient's medications, allergies, and social histories were reviewed and updated as appropriate.      ROS:     CONS:     No fever, no chills, no weight loss, no fatigue   EYES:      No diplopia, no blurry vision, no redness of eyes, no swelling of eyelids   ENT:    No hearing loss, No ear pain, No sore throat, no dysphagia, no neck swelling   CV:     No chest pain, no palpitations, no claudication, no orthopnea, no PND   PULM:    No SOB, no cough, no hemoptysis, no wheezing    GI:   No nausea, no vomiting, no diarrhea, no constipation, no  bloody stools   :  Passing urine well, no dysuria, no hematuria   ENDO:   No polyuria, no polydipsia, no heat intolerance, no cold intolerance   NEURO: No headaches, no dizziness, no convulsions, no tremors   MUSC:  No joint swellings, no arthralgias, no myalgias, no weakness   SKIN:   No rash, no ulcers, no dry skin   PSYCH:   No depression, no anxiety, no difficulty sleeping       Past Medical History:  Patient Active Problem List    Diagnosis Date Noted   • 9 weeks gestation of pregnancy 05/23/2022   • Lip swelling 01/30/2020   • Skin irritation 01/30/2020   • Hypothyroidism, acquired, autoimmune 09/06/2019   • Annual physical exam 11/02/2018   • Hashimoto's thyroiditis 09/15/2017   • Vitamin D deficiency 09/15/2017       Past Surgical History:  No past surgical history on file.     Allergies:  Penicillins     Current Medications:    Current Outpatient Medications:   •  levothyroxine (SYNTHROID) 112 MCG Tab, TAKE 1 TABLET BY MOUTH EVERY DAY IN THE MORNING ON AN EMPTY STOMACH, NOT ON FORMULARY, Disp: 90 Tablet, Rfl: 3  •  liothyronine (CYTOMEL) 5 MCG Tab, Take 1 tablet by mouth every day., Disp: 90 tablet, Rfl: 3  •  Cholecalciferol (VITAMIN D-3) 125 MCG (5000 UT) Tab, Take  by mouth., Disp: , Rfl:   •  Prenatal MV-Min-Fe Fum-FA-DHA (PRENATAL 1 PO), Take  by mouth., Disp: , Rfl:     Social History:  Social History     Socioeconomic History   • Marital status:      Spouse name: Not on file   • Number of children: Not on file   • Years of education: Not on file   • Highest education level: Not on file   Occupational History   • Not on file   Tobacco Use   • Smoking status: Never Smoker   • Smokeless tobacco: Never Used   Vaping Use   • Vaping Use: Never used   Substance and Sexual Activity   • Alcohol use: Not Currently     Comment: rarely   • Drug use: No   • Sexual activity: Yes     Partners: Male     Comment: , one children, pregnant. Planned pregnancy. FOB is involved and supportive.   Other  "Topics Concern   • Not on file   Social History Narrative   • Not on file     Social Determinants of Health     Financial Resource Strain: Not on file   Food Insecurity: Not on file   Transportation Needs: Not on file   Physical Activity: Not on file   Stress: Not on file   Social Connections: Not on file   Intimate Partner Violence: Not on file   Housing Stability: Not on file        Family History:   No family history on file.      PHYSICAL EXAM:   Vital signs: BP (!) 92/58   Pulse (!) 107   Ht 1.702 m (5' 7\")   Wt 72.6 kg (160 lb)   SpO2 97%   BMI 25.06 kg/m²   GENERAL: Well-developed, well-nourished  in no apparent distress.   EYE: No ocular and eyelid asymmetry, Anicteric sclerae,  PERRL  HENT: Hearing grossly intact, Normocephalic, atraumatic. Pink, moist mucous membranes, No exudate  NECK: Supple. Trachea midline. thyroid is normal in size without nodules or tenderness  CARDIOVASCULAR: Regular rate and rhythm. No murmurs, rubs, or gallops.   LUNGS: Clear to auscultation bilaterally   EXTREMITIES: No clubbing, cyanosis, or edema.   NEUROLOGICAL: Cranial nerves II-XII are grossly intact   Symmetric reflexes at the patella no proximal muscle weakness  LYMPH: No cervical, supraclavicular,  adenopathy palpated.   SKIN: No rashes, lesions. Turgor is normal.    Labs:    Lab Results   Component Value Date/Time    TSHULTRASEN 0.040 (L) 2021 1112     Lab Results   Component Value Date/Time    FREET4 1.27 2021 1112     Lab Results   Component Value Date/Time    FREET3 2.66 2021 1112         Imaging:      ASSESSMENT/PLAN:   1. Hypothyroidism during pregnancy in first trimester  Clinically stable  Biochemically she did for her first trimester-ideally we want to keep her TSH between 0.5-2 pregnancy    We discussed the importance of adequately treating her hypothyroidism during pregnancy to avoid  labor  We will keep the same dose, please see HPI    Standing orders for thyroid hormone levels  " to do every 4 weeks    - TSH; Standing  - FREE THYROXINE; Standing  - T3 FREE; Standing  - Comp Metabolic Panel; Future    2. Hashimoto's disease  Etiology of her hypothyroidism    3. Vitamin D deficiency  I will evaluate levels    Disposition: Follow-up in 3 months  Do your standing thyroid hormone levels every 4 weeks  Do your CMP before your appointment in 3 months    Thank you kindly for allowing me to participate in the thyroid care plan for this patient.    ELIZABETH MahmoodR.N.  06/28/22    CC:   Dm Irvin P.A.-C.

## 2022-07-15 ENCOUNTER — INITIAL PRENATAL (OUTPATIENT)
Dept: OBGYN | Facility: CLINIC | Age: 32
End: 2022-07-15
Payer: COMMERCIAL

## 2022-07-15 VITALS — BODY MASS INDEX: 24.9 KG/M2 | DIASTOLIC BLOOD PRESSURE: 66 MMHG | WEIGHT: 159 LBS | SYSTOLIC BLOOD PRESSURE: 104 MMHG

## 2022-07-15 DIAGNOSIS — E06.3 HYPOTHYROIDISM, ACQUIRED, AUTOIMMUNE: ICD-10-CM

## 2022-07-15 DIAGNOSIS — E06.3 HASHIMOTO'S THYROIDITIS: ICD-10-CM

## 2022-07-15 DIAGNOSIS — Z3A.16 16 WEEKS GESTATION OF PREGNANCY: ICD-10-CM

## 2022-07-15 PROCEDURE — 0500F INITIAL PRENATAL CARE VISIT: CPT | Performed by: OBSTETRICS & GYNECOLOGY

## 2022-07-15 ASSESSMENT — EDINBURGH POSTNATAL DEPRESSION SCALE (EPDS)
I HAVE BLAMED MYSELF UNNECESSARILY WHEN THINGS WENT WRONG: NO, NEVER
I HAVE LOOKED FORWARD WITH ENJOYMENT TO THINGS: AS MUCH AS I EVER DID
I HAVE FELT SCARED OR PANICKY FOR NO GOOD REASON: NO, NOT AT ALL
THE THOUGHT OF HARMING MYSELF HAS OCCURRED TO ME: NEVER
I HAVE BEEN ANXIOUS OR WORRIED FOR NO GOOD REASON: NO, NOT AT ALL
I HAVE BEEN SO UNHAPPY THAT I HAVE HAD DIFFICULTY SLEEPING: NOT AT ALL
TOTAL SCORE: 0
I HAVE FELT SAD OR MISERABLE: NO, NOT AT ALL
I HAVE BEEN SO UNHAPPY THAT I HAVE BEEN CRYING: NO, NEVER
THINGS HAVE BEEN GETTING ON TOP OF ME: NO, I HAVE BEEN COPING AS WELL AS EVER
I HAVE BEEN ABLE TO LAUGH AND SEE THE FUNNY SIDE OF THINGS: AS MUCH AS I ALWAYS COULD

## 2022-07-15 ASSESSMENT — FIBROSIS 4 INDEX: FIB4 SCORE: 0.46

## 2022-07-15 NOTE — NON-PROVIDER
Pt here today for OB follow up  Pt states No concerns   Reports +FM   Good # [phone] (home)   Pharmacy Confirmed.

## 2022-07-15 NOTE — PROGRESS NOTES
Cc: New OB visit    HPI:  The patient is a 32 y.o.  16w2d based upon last menstrual cycle and early ultrasound  No LMP recorded. Patient is pregnant.    She presents for her new obstetric visit.    She reports fetal movement, denies vaginal bleeding, denies leakage of fluid, denies contractions.     She denies nausea/vomiting, headaches, or urinary symptoms.      OB History    Para Term  AB Living   2 1 1     1   SAB IAB Ectopic Molar Multiple Live Births           0 1      # Outcome Date GA Lbr Neal/2nd Weight Sex Delivery Anes PTL Lv   2 Current            1 Term 21 38w4d / 01:26 3.21 kg (7 lb 1.2 oz) F Vag-Spont None N HOA     Past Medical History:   Diagnosis Date   • Asthma    • Hypotension    • Thyroid disease      No past surgical history on file.  Social History     Socioeconomic History   • Marital status:      Spouse name: Not on file   • Number of children: Not on file   • Years of education: Not on file   • Highest education level: Not on file   Occupational History   • Not on file   Tobacco Use   • Smoking status: Never Smoker   • Smokeless tobacco: Never Used   Vaping Use   • Vaping Use: Never used   Substance and Sexual Activity   • Alcohol use: Not Currently     Comment: rarely   • Drug use: No   • Sexual activity: Yes     Partners: Male     Comment: , one children, pregnant. Planned pregnancy. FOB is involved and supportive.   Other Topics Concern   • Not on file   Social History Narrative   • Not on file     Social Determinants of Health     Financial Resource Strain: Not on file   Food Insecurity: Not on file   Transportation Needs: Not on file   Physical Activity: Not on file   Stress: Not on file   Social Connections: Not on file   Intimate Partner Violence: Not on file   Housing Stability: Not on file     No family history on file.  Allergies:   Allergies as of 07/15/2022 - Reviewed 2022   Allergen Reaction Noted   • Penicillins Hives 2014        PE:    /66   Wt 72.1 kg (159 lb)     General: no apparent distress, is well developed and well nourished  Head: normocephalic, atraumatic  Neck: neck is supple, non-tender, no thyromegaly, full range of motion  CVS: regular rate and rhythm, no peripheral edema  Lungs: Normal respiratory effort. Clear to auscultation bilaterally  Abdomen: Bowel sounds positive, nondistended, soft, nontender x4, no rebound or guarding.  Female GYN: exam deferred  Skin: No rashes, or ulcers or lesions seen  Psychiatric: Patient shows appropriate affect, is alert and oriented x3, intact judgment and insight.        A/P:  32 y.o.  16w2d based upon  No LMP recorded. Patient is pregnant..  She is here for her new obstetric visit.    1. 16 weeks gestation of pregnancy  US-OB 2ND 3RD TRI COMPLETE       1. Ultrasound for dates and anatomy ordered.  2.  Second trimester screening for Down Syndrome and neural tube defects offered.  Patient declined  3.  Reviewed prenatal labs.  4.  NOB packet given with ACOG prenatal book.  5.  Increase water intake and encouraged healthy nutrition.  6.  Referral to M not needed at this time.  7.  Continue prenatal vitamins.  8.  Follow-up in 4 weeks.   9.  SAB precautions educated.    Patient has thyroid labs levels draw next week    All questions answered

## 2022-07-27 LAB
T3FREE SERPL-MCNC: 2.7 PG/ML (ref 2–4.4)
T4 FREE SERPL-MCNC: 1.29 NG/DL (ref 0.82–1.77)
TSH SERPL DL<=0.005 MIU/L-ACNC: 0.33 UIU/ML (ref 0.45–4.5)

## 2022-08-11 LAB
ALBUMIN SERPL-MCNC: 3.7 G/DL (ref 3.9–5)
ALBUMIN/GLOB SERPL: 1.3 {RATIO} (ref 1.2–2.2)
ALP SERPL-CCNC: 56 IU/L (ref 44–121)
ALT SERPL-CCNC: 53 IU/L (ref 0–32)
AST SERPL-CCNC: 28 IU/L (ref 0–40)
BILIRUB SERPL-MCNC: 0.3 MG/DL (ref 0–1.2)
BUN SERPL-MCNC: 8 MG/DL (ref 6–20)
BUN/CREAT SERPL: 14 (ref 9–23)
CALCIUM SERPL-MCNC: 8.4 MG/DL (ref 8.7–10.2)
CHLORIDE SERPL-SCNC: 100 MMOL/L (ref 96–106)
CO2 SERPL-SCNC: 21 MMOL/L (ref 20–29)
CREAT SERPL-MCNC: 0.57 MG/DL (ref 0.57–1)
EGFRCR SERPLBLD CKD-EPI 2021: 125 ML/MIN/1.73
GLOBULIN SER CALC-MCNC: 2.9 G/DL (ref 1.5–4.5)
GLUCOSE SERPL-MCNC: 84 MG/DL (ref 65–99)
POTASSIUM SERPL-SCNC: 3.4 MMOL/L (ref 3.5–5.2)
PROT SERPL-MCNC: 6.6 G/DL (ref 6–8.5)
SODIUM SERPL-SCNC: 136 MMOL/L (ref 134–144)

## 2022-08-16 ENCOUNTER — ROUTINE PRENATAL (OUTPATIENT)
Dept: OBGYN | Facility: CLINIC | Age: 32
End: 2022-08-16

## 2022-08-16 VITALS — DIASTOLIC BLOOD PRESSURE: 66 MMHG | SYSTOLIC BLOOD PRESSURE: 105 MMHG | WEIGHT: 161 LBS | BODY MASS INDEX: 25.22 KG/M2

## 2022-08-16 DIAGNOSIS — E06.3 HASHIMOTO'S THYROIDITIS: ICD-10-CM

## 2022-08-16 PROBLEM — Z3A.20 20 WEEKS GESTATION OF PREGNANCY: Status: ACTIVE | Noted: 2022-05-23

## 2022-08-16 PROCEDURE — 0502F SUBSEQUENT PRENATAL CARE: CPT | Performed by: OBSTETRICS & GYNECOLOGY

## 2022-08-16 ASSESSMENT — FIBROSIS 4 INDEX: FIB4 SCORE: 0.43

## 2022-08-16 NOTE — PROGRESS NOTES
Chief complaint: Return visit    S: Pt presents for routine OB follow up.  No contractions, vaginal bleeding, or leaking fluids. Good fetal movement.    Questions answered.    O: /66   Wt 73 kg (161 lb)   BMI 25.22 kg/m²   Patients' weight gain, fluid intake and exercise level discussed.  Vitals, fundal height , fetal position, and FHR reviewed on flowsheet    Lab:  Recent Results (from the past 336 hour(s))   COMP METABOLIC PANEL    Collection Time: 08/10/22  4:04 AM   Result Value Ref Range    Glucose 84 65 - 99 mg/dL    Bun 8 6 - 20 mg/dL    Creatinine 0.57 0.57 - 1.00 mg/dL    eGFR 125 >59 mL/min/1.73    Bun-Creatinine Ratio 14 9 - 23    Sodium 136 134 - 144 mmol/L    Potassium 3.4 (L) 3.5 - 5.2 mmol/L    Chloride 100 96 - 106 mmol/L    Co2 21 20 - 29 mmol/L    Calcium 8.4 (L) 8.7 - 10.2 mg/dL    Total Protein 6.6 6.0 - 8.5 g/dL    Albumin 3.7 (L) 3.9 - 5.0 g/dL    Globulin 2.9 1.5 - 4.5 g/dL    A-G Ratio 1.3 1.2 - 2.2    Total Bilirubin 0.3 0.0 - 1.2 mg/dL    Alkaline Phosphatase 56 44 - 121 IU/L    AST(SGOT) 28 0 - 40 IU/L    ALT(SGPT) 53 (H) 0 - 32 IU/L       A/P:  32 y.o.  at 20w6d presents for routine obstetric follow-up.  Size equals dates and/or scan    1.  Continue prenatal vitamins.  2.  Begin kick counts at 28 weeks.  3.  Exercise at least 30 minutes daily.  4.  Drink at least 2 Liters of water daily  5.  Follow-up in 4 weeks.     Has anatomical ultrasound scheduled for later this week    Labs reviewed    All questions answered

## 2022-08-16 NOTE — NON-PROVIDER
Pt here today for OB follow up  Pt states no complaints   Reports +FM  Good # [phone]   Pharmacy Confirmed.

## 2022-08-18 ENCOUNTER — HOSPITAL ENCOUNTER (OUTPATIENT)
Dept: RADIOLOGY | Facility: MEDICAL CENTER | Age: 32
End: 2022-08-18
Attending: OBSTETRICS & GYNECOLOGY
Payer: COMMERCIAL

## 2022-08-18 DIAGNOSIS — Z3A.16 16 WEEKS GESTATION OF PREGNANCY: ICD-10-CM

## 2022-08-18 PROCEDURE — 76817 TRANSVAGINAL US OBSTETRIC: CPT

## 2022-08-26 LAB
T3FREE SERPL-MCNC: 2.4 PG/ML (ref 2–4.4)
T4 FREE SERPL-MCNC: 1.05 NG/DL (ref 0.82–1.77)
TSH SERPL DL<=0.005 MIU/L-ACNC: 0.74 UIU/ML (ref 0.45–4.5)

## 2022-09-22 ENCOUNTER — ROUTINE PRENATAL (OUTPATIENT)
Dept: OBGYN | Facility: CLINIC | Age: 32
End: 2022-09-22
Payer: COMMERCIAL

## 2022-09-22 VITALS — DIASTOLIC BLOOD PRESSURE: 68 MMHG | WEIGHT: 172 LBS | BODY MASS INDEX: 26.94 KG/M2 | SYSTOLIC BLOOD PRESSURE: 125 MMHG

## 2022-09-22 DIAGNOSIS — Z3A.26 PREGNANCY WITH 26 COMPLETED WEEKS GESTATION: ICD-10-CM

## 2022-09-22 DIAGNOSIS — O44.03 PLACENTA PREVIA IN THIRD TRIMESTER: ICD-10-CM

## 2022-09-22 LAB
T3FREE SERPL-MCNC: 2.5 PG/ML (ref 2–4.4)
T4 FREE SERPL-MCNC: 1.04 NG/DL (ref 0.82–1.77)
TSH SERPL DL<=0.005 MIU/L-ACNC: 0.49 UIU/ML (ref 0.45–4.5)

## 2022-09-22 PROCEDURE — 0502F SUBSEQUENT PRENATAL CARE: CPT | Performed by: OBSTETRICS & GYNECOLOGY

## 2022-09-22 ASSESSMENT — FIBROSIS 4 INDEX: FIB4 SCORE: 0.43

## 2022-09-22 NOTE — NON-PROVIDER
Pt here today for OB follow up  Pt states no concerns   Reports +FM  Good # [phone]   Pharmacy Confirmed.

## 2022-09-22 NOTE — PROGRESS NOTES
Chief complaint: Return visit    S: Pt presents for routine OB follow up.  No contractions, vaginal bleeding, or leaking fluids. Good fetal movement.    Anatomical ultrasound shows what appears to be total placenta previa with vasa previa.  This had previously been discussed with the patient.  Placed on pelvic rest    Questions answered.    O: /68   Wt 78 kg (172 lb)   BMI 26.94 kg/m²   Patients' weight gain, fluid intake and exercise level discussed.  Vitals, fundal height , fetal position, and FHR reviewed on flowsheet    Lab:  Recent Results (from the past 336 hour(s))   THYROXINE (T4)    Collection Time: 22  8:13 AM   Result Value Ref Range    Free T-4 1.04 0.82 - 1.77 ng/dL   TSH    Collection Time: 22  8:13 AM   Result Value Ref Range    TSH 0.487 0.450 - 4.500 uIU/mL   T3 FREE    Collection Time: 22  8:13 AM   Result Value Ref Range    T3,Free 2.5 2.0 - 4.4 pg/mL       A/P:  32 y.o.  at 26w1d presents for routine obstetric follow-up.  Size equals dates and/or scan    1.  Continue prenatal vitamins.  2.  Begin kick counts at 28 weeks.  3.  Exercise at least 30 minutes daily.  4.  Drink at least 2 Liters of water daily  5.  Follow-up in 2 weeks.     Discussed implications of bold placenta previa and vasa previa with the patient.  Given description by radiology, I find it unlikely that the placenta previa will resolve but will follow up with another ultrasound in approximately 4 weeks.  If placenta previa remains, discussed with the patient that  will be recommended between 36 to 37 weeks gestation.    Precautions were also discussed with the patient and she understands that if there is any significant bleeding before 37 weeks gestation, that delivery may need to be performed earlier via .    All questions answered    CBC, RPR and 1 hour gestational Glucola ordered

## 2022-09-29 DIAGNOSIS — E06.3 HYPOTHYROIDISM, ACQUIRED, AUTOIMMUNE: ICD-10-CM

## 2022-09-29 DIAGNOSIS — E06.3 HASHIMOTO'S THYROIDITIS: ICD-10-CM

## 2022-09-30 RX ORDER — LIOTHYRONINE SODIUM 5 UG/1
5 TABLET ORAL
Qty: 90 TABLET | Refills: 3 | Status: SHIPPED | OUTPATIENT
Start: 2022-09-30 | End: 2022-10-04 | Stop reason: SDUPTHER

## 2022-10-03 ENCOUNTER — TELEMEDICINE (OUTPATIENT)
Dept: ENDOCRINOLOGY | Facility: MEDICAL CENTER | Age: 32
End: 2022-10-03
Payer: COMMERCIAL

## 2022-10-03 DIAGNOSIS — E06.3 HASHIMOTO'S DISEASE: ICD-10-CM

## 2022-10-03 DIAGNOSIS — E03.9 HYPOTHYROIDISM, ACQUIRED: ICD-10-CM

## 2022-10-03 PROCEDURE — 99214 OFFICE O/P EST MOD 30 MIN: CPT | Mod: 95

## 2022-10-03 NOTE — PROGRESS NOTES
Chief Complaint: Follow-up on the following conditions  Patient was presented for a telehealth consultation via secure and encrypted videoconferencing technology. This encounter was conducted via Zoom . Verbal consent was obtained. Patient's identity was verified.   HPI:   Osito Rivera is a 32 y.o. female  here for initial evaluation.      1.  Hypothyroidism during pregnancy first trimester:  Diagnosed with hypothyroidism since age of 17  She initially was treated with Synthroid, then switched to Tirosint and currently she is taking Synthroid again for Tirosint got too expensive    She is currently on Synthroid 112 mcg for 2 days then 3rd day she doubles to 224 mcg then continues same regimen  She is also taking Liothyronine 5 mcg daily    She currently 25 weeks pregnant, this is her second pregnancy  Patient was diagnosed with placenta previa-this is followed by OB/GYN, currently she is on pelvic rest and the plan is for her to deliver at 36 to 37 weeks    She reports good compliance and takes her thyroid hormone daily before breakfast.   She denies taking any iron, calcium supplements or antacids.     In general she is feeling well and at this time she feels clinically stable       Latest Reference Range & Units 9/21/22 08:13   TSH 0.450 - 4.500 uIU/mL 0.487   Free T-4 0.82 - 1.77 ng/dL 1.04   T3,Free 2.0 - 4.4 pg/mL 2.5      Latest Reference Range & Units 8/25/22 04:18   TSH 0.450 - 4.500 uIU/mL 0.738   Free T-4 0.82 - 1.77 ng/dL 1.05   T3,Free 2.0 - 4.4 pg/mL 2.4     2.  Hashimoto's disease:  Reports elevated antibody levels and etiology of her hypothyroidism    3.  Vitamin D deficiency:  Currently taking 4000 IUs OTC daily vitamin D3    Patient's medications, allergies, and social histories were reviewed and updated as appropriate.      ROS:     CONS:     No fever, no chills, no weight loss, no fatigue   EYES:      No diplopia, no blurry vision, no redness of eyes, no swelling of eyelids   ENT:    No  hearing loss, No ear pain, No sore throat, no dysphagia, no neck swelling   CV:     No chest pain, no palpitations, no claudication, no orthopnea, no PND   PULM:    No SOB, no cough, no hemoptysis, no wheezing    GI:   No nausea, no vomiting, no diarrhea, no constipation, no bloody stools   :  Passing urine well, no dysuria, no hematuria   ENDO:   No polyuria, no polydipsia, no heat intolerance, no cold intolerance   NEURO: No headaches, no dizziness, no convulsions, no tremors   MUSC:  No joint swellings, no arthralgias, no myalgias, no weakness   SKIN:   No rash, no ulcers, no dry skin   PSYCH:   No depression, no anxiety, no difficulty sleeping       Past Medical History:  Patient Active Problem List    Diagnosis Date Noted    26 weeks gestation of pregnancy 05/23/2022    Lip swelling 01/30/2020    Skin irritation 01/30/2020    Hypothyroidism, acquired, autoimmune 09/06/2019    Annual physical exam 11/02/2018    Hashimoto's thyroiditis 09/15/2017    Vitamin D deficiency 09/15/2017       Past Surgical History:  No past surgical history on file.     Allergies:  Penicillins     Current Medications:    Current Outpatient Medications:     liothyronine (CYTOMEL) 5 MCG Tab, Take 1 Tablet by mouth every day., Disp: 90 Tablet, Rfl: 3    Prenatal Vit-Fe Fumarate-FA (PRENATAL VITAMIN PO), Take  by mouth., Disp: , Rfl:     levothyroxine (SYNTHROID) 112 MCG Tab, TAKE 1 TABLET BY MOUTH EVERY DAY IN THE MORNING ON AN EMPTY STOMACH, NOT ON FORMULARY (Patient taking differently: TAKE 1 TABLET BY MOUTH DAY FOR 3 DAYS, THEN 2 TABS FOR 1 DAY, REPEAT), Disp: 90 Tablet, Rfl: 3    Cholecalciferol (VITAMIN D-3) 125 MCG (5000 UT) Tab, Take  by mouth., Disp: , Rfl:     Prenatal MV-Min-Fe Fum-FA-DHA (PRENATAL 1 PO), Take  by mouth., Disp: , Rfl:     Social History:  Social History     Socioeconomic History    Marital status:      Spouse name: Not on file    Number of children: Not on file    Years of education: Not on file     Highest education level: Not on file   Occupational History    Not on file   Tobacco Use    Smoking status: Never    Smokeless tobacco: Never   Vaping Use    Vaping Use: Never used   Substance and Sexual Activity    Alcohol use: Not Currently     Comment: rarely    Drug use: No    Sexual activity: Yes     Partners: Male     Comment: , one children, pregnant. Planned pregnancy. FOB is involved and supportive.   Other Topics Concern    Not on file   Social History Narrative    Not on file     Social Determinants of Health     Financial Resource Strain: Not on file   Food Insecurity: Not on file   Transportation Needs: Not on file   Physical Activity: Not on file   Stress: Not on file   Social Connections: Not on file   Intimate Partner Violence: Not on file   Housing Stability: Not on file        Family History:   No family history on file.      PHYSICAL EXAM:   Vital signs: virtual visit  GENERAL: Well-developed, well-nourished  in no apparent distress.       Labs:    Lab Results   Component Value Date/Time    TSHULTRASEN 0.040 (L) 06/08/2021 1112     Lab Results   Component Value Date/Time    FREET4 1.27 06/08/2021 1112     Lab Results   Component Value Date/Time    FREET3 2.66 06/08/2021 1112         Imaging:      ASSESSMENT/PLAN:   1. Hypothyroidism, acquired  Clinically stable  Biochemically stable  Extensive discussion with patient Due to hypothyroid blood work every 4 weeks, with a goal of keeping her TSH between 0.5-2 during pregnancy  Standing orders for thyroid blood work in place.   Educated patient that if she has not heard from me 2 to 3 days after doing her blood work, she needs to message me through [facility]    2. Hashimoto's disease  Etiology of her hypothyroidism      Disposition: Follow-up in 1-3 months  Do your standing thyroid hormone levels every 4 weeks    Thank you kindly for allowing me to participate in the thyroid care plan for this patient.    Javon Nur,  [facility]  10/03/2022    CC:   Dm Irvin P.A.-C.

## 2022-10-04 DIAGNOSIS — E06.3 HYPOTHYROIDISM, ACQUIRED, AUTOIMMUNE: ICD-10-CM

## 2022-10-04 DIAGNOSIS — E03.9 HYPOTHYROIDISM, ACQUIRED: ICD-10-CM

## 2022-10-04 DIAGNOSIS — E06.3 HASHIMOTO'S THYROIDITIS: ICD-10-CM

## 2022-10-04 RX ORDER — LIOTHYRONINE SODIUM 5 UG/1
5 TABLET ORAL
Qty: 90 TABLET | Refills: 3 | Status: SHIPPED | OUTPATIENT
Start: 2022-10-04 | End: 2023-01-06

## 2022-10-05 ENCOUNTER — ROUTINE PRENATAL (OUTPATIENT)
Dept: OBGYN | Facility: CLINIC | Age: 32
End: 2022-10-05
Payer: COMMERCIAL

## 2022-10-05 VITALS — BODY MASS INDEX: 27.1 KG/M2 | SYSTOLIC BLOOD PRESSURE: 130 MMHG | WEIGHT: 173 LBS | DIASTOLIC BLOOD PRESSURE: 72 MMHG

## 2022-10-05 DIAGNOSIS — O09.93 PREGNANCY, SUPERVISION, HIGH-RISK, THIRD TRIMESTER: ICD-10-CM

## 2022-10-05 PROCEDURE — 0502F SUBSEQUENT PRENATAL CARE: CPT | Performed by: OBSTETRICS & GYNECOLOGY

## 2022-10-05 ASSESSMENT — FIBROSIS 4 INDEX: FIB4 SCORE: 0.43

## 2022-10-05 NOTE — PROGRESS NOTES
CARLO:  28w0d    Pt reports doing well.  Denies vaginal bleeding, contractions, LOF.  Reports +FM. She had modified her activity level and has been on pelvic rest. She has an appt tomorrow for her glucola.     /72   Wt 173 lb   BMI 27.10 kg/m²   gen: AAO, NAD  FHTs: 145  FH: 29    A/P: 32 y.o.  @ 28w0d   - PNL up to date, Rh+  Glucola pending (tomorrow)  - vasa previa- discussed vasa previa and placenta previa and offered referral to M for evaluation and recommendations for delivery timing etc and she desires referral to Dr Batista. Sent today.     Reviewed labor precautions (to call or come to hospital for ctx q5min, VB, LOF, decreased FM)    RTC 2wks    We do not have access to our med room today and therefor Flu vaccine or TdAP unable to be given.

## 2022-10-06 ENCOUNTER — HOSPITAL ENCOUNTER (OUTPATIENT)
Dept: LAB | Facility: MEDICAL CENTER | Age: 32
End: 2022-10-06
Attending: OBSTETRICS & GYNECOLOGY
Payer: COMMERCIAL

## 2022-10-06 DIAGNOSIS — Z3A.26 PREGNANCY WITH 26 COMPLETED WEEKS GESTATION: ICD-10-CM

## 2022-10-06 LAB
ERYTHROCYTE [DISTWIDTH] IN BLOOD BY AUTOMATED COUNT: 48.1 FL (ref 35.9–50)
GLUCOSE 1H P 50 G GLC PO SERPL-MCNC: 84 MG/DL (ref 70–139)
HCT VFR BLD AUTO: 38.9 % (ref 37–47)
HGB BLD-MCNC: 13.1 G/DL (ref 12–16)
MCH RBC QN AUTO: 32.8 PG (ref 27–33)
MCHC RBC AUTO-ENTMCNC: 33.7 G/DL (ref 33.6–35)
MCV RBC AUTO: 97.5 FL (ref 81.4–97.8)
PLATELET # BLD AUTO: 267 K/UL (ref 164–446)
PMV BLD AUTO: 9.2 FL (ref 9–12.9)
RBC # BLD AUTO: 3.99 M/UL (ref 4.2–5.4)
T PALLIDUM AB SER QL IA: NORMAL
WBC # BLD AUTO: 10.3 K/UL (ref 4.8–10.8)

## 2022-10-06 PROCEDURE — 86780 TREPONEMA PALLIDUM: CPT

## 2022-10-06 PROCEDURE — 85027 COMPLETE CBC AUTOMATED: CPT

## 2022-10-06 PROCEDURE — 82950 GLUCOSE TEST: CPT

## 2022-10-06 PROCEDURE — 36415 COLL VENOUS BLD VENIPUNCTURE: CPT

## 2022-10-12 ENCOUNTER — HOSPITAL ENCOUNTER (OUTPATIENT)
Dept: RADIOLOGY | Facility: MEDICAL CENTER | Age: 32
End: 2022-10-12
Attending: OBSTETRICS & GYNECOLOGY
Payer: COMMERCIAL

## 2022-10-12 DIAGNOSIS — O44.03 PLACENTA PREVIA IN THIRD TRIMESTER: ICD-10-CM

## 2022-10-12 PROCEDURE — 76817 TRANSVAGINAL US OBSTETRIC: CPT

## 2022-10-13 ENCOUNTER — TELEPHONE (OUTPATIENT)
Dept: OBGYN | Facility: CLINIC | Age: 32
End: 2022-10-13
Payer: COMMERCIAL

## 2022-10-13 DIAGNOSIS — O44.03 PLACENTA PREVIA IN THIRD TRIMESTER: ICD-10-CM

## 2022-10-13 NOTE — TELEPHONE ENCOUNTER
10/13/22 11:45 AM    Pt was referred to Dr. Batista for vasa previa, Lester not able to see pt until Nov. 26th. Per Dr. Randolph pt does not need High Risk referral, pt had Ultrasound and confirmed previa.

## 2022-10-18 ENCOUNTER — ROUTINE PRENATAL (OUTPATIENT)
Dept: OBGYN | Facility: CLINIC | Age: 32
End: 2022-10-18
Payer: COMMERCIAL

## 2022-10-18 VITALS — DIASTOLIC BLOOD PRESSURE: 66 MMHG | WEIGHT: 182 LBS | SYSTOLIC BLOOD PRESSURE: 109 MMHG | BODY MASS INDEX: 28.51 KG/M2

## 2022-10-18 DIAGNOSIS — O44.03 PLACENTA PREVIA IN THIRD TRIMESTER: ICD-10-CM

## 2022-10-18 DIAGNOSIS — Z3A.29 29 WEEKS GESTATION OF PREGNANCY: ICD-10-CM

## 2022-10-18 PROCEDURE — 0502F SUBSEQUENT PRENATAL CARE: CPT | Performed by: OBSTETRICS & GYNECOLOGY

## 2022-10-18 ASSESSMENT — FIBROSIS 4 INDEX: FIB4 SCORE: 0.43

## 2022-10-18 NOTE — NON-PROVIDER
Pt here today for OB follow up  Pt states Alexei delgado for the last week and half   Reports +FM  Good #[phone]   Pharmacy Confirmed.  Pt informed about K/C and declined TDAP today.

## 2022-10-18 NOTE — PROGRESS NOTES
Chief complaint: Return OB visit    S: Pt presents for routine OB follow up. Good fetal movement.  No contractions, vaginal bleeding, or leakage of fluid.    Questions answered.    O: /66   Wt 182 lb   BMI 28.51 kg/m²   Patients' weight gain, fluid intake and exercise level discussed.  Vitals, fundal height , fetal position, and FHR reviewed on flowsheet    Lab:  Recent Results (from the past 336 hour(s))   CBC WITHOUT DIFFERENTIAL    Collection Time: 10/06/22 10:38 AM   Result Value Ref Range    WBC 10.3 4.8 - 10.8 K/uL    RBC 3.99 (L) 4.20 - 5.40 M/uL    Hemoglobin 13.1 12.0 - 16.0 g/dL    Hematocrit 38.9 37.0 - 47.0 %    MCV 97.5 81.4 - 97.8 fL    MCH 32.8 27.0 - 33.0 pg    MCHC 33.7 33.6 - 35.0 g/dL    RDW 48.1 35.9 - 50.0 fL    Platelet Count 267 164 - 446 K/uL    MPV 9.2 9.0 - 12.9 fL   T.PALLIDUM AB BRITTNY (SCREENING)    Collection Time: 10/06/22 10:38 AM   Result Value Ref Range    Syphilis, Treponemal Qual Non-Reactive Non-Reactive   GLUCOSE 1HR GESTATIONAL    Collection Time: 10/06/22 10:39 AM   Result Value Ref Range    Glucose, Post Dose 84 70 - 139 mg/dL       A/P:  32 y.o.  at 29w6d presents for routine obstetric follow-up.  Size equals dates and/or scan    1.  Continue prenatal vitamins.  2.  Fetal kick counts.  3.  Exercise at least 30 minutes daily.  4.  Drink at least 2L of water daily  5.  Labor precautions educated.  6.  Follow-up in 2 weeks.  7.  GBS 35 weeks    Placenta previa with vasa previa.  Plan for repeat ultrasound later next month.  Likely will need .  This has been tentatively scheduled for     Precaution discussed with patient

## 2022-10-18 NOTE — LETTER
"Count Your Baby's Movements  Another step to a healthy delivery    Osito Rivera             Dept: [phone]    How Many Weeks Pregnant? 29W6D    Date to Begin Counting: 10/18/2022              How to use this chart    One way for your physician to keep track of your baby's health is by knowing how often the baby moves (or \"kicks\") in your womb.  You can help your physician to do this by using this chart every day.    Every day, you should see how many hours it takes for your baby to move 10 times.  Start in the morning, as soon as you get up.    · First, write down the time your baby moves until you get to 10.  · Check off one box every time your baby moves until you get to 10.  · Write down the time you finished counting in the last column.  · Total how long it took to count up all 10 movements.  · Finally, fill in the box that shows how long this took.  After counting 10 movements, you no longer have to count any more that day.  The next morning, just start counting again as soon as you get up.    What should you call a \"movement\"?  It is hard to say, because it will feel different from one mother to another and from one pregnancy to the next.  The important thing is that you count the movements the same way throughout your pregnancy.  If you have more questions, you should ask your physician.    Count carefully every day!  SAMPLE:  Week 28    How many hours did it take to feel 10 movements?       Start  Time     1     2     3     4     5     6     7     8     9     10   Finish Time   Mon 8:20 ·  ·  ·  ·  ·  ·  ·  ·  ·  ·  11:40   Tue Wed Thu Fri               Sat               Sun                 IMPORTANT: You should contact your physician if it takes more than two hours for you to feel 10 movements.  Each morning, write down the time and start to count the movements of your baby.  Keep track by checking off one box every time you feel one movement.  When you have " "felt 10 \"kicks\", write down the time you finished counting in the last column.  Then fill in the   box (over the check pk) for the number of hours it took.  Be sure to read the complete instructions on the previous page.            "

## 2022-10-26 LAB
T3FREE SERPL-MCNC: 2.5 PG/ML (ref 2–4.4)
T4 FREE SERPL-MCNC: 1.12 NG/DL (ref 0.82–1.77)
TSH SERPL DL<=0.005 MIU/L-ACNC: 0.38 UIU/ML (ref 0.45–4.5)

## 2022-10-31 ENCOUNTER — ROUTINE PRENATAL (OUTPATIENT)
Dept: OBGYN | Facility: CLINIC | Age: 32
End: 2022-10-31
Payer: COMMERCIAL

## 2022-10-31 VITALS — BODY MASS INDEX: 28.98 KG/M2 | DIASTOLIC BLOOD PRESSURE: 60 MMHG | WEIGHT: 185 LBS | SYSTOLIC BLOOD PRESSURE: 105 MMHG

## 2022-10-31 DIAGNOSIS — O09.93 SUPERVISION OF HIGH RISK PREGNANCY IN THIRD TRIMESTER: ICD-10-CM

## 2022-10-31 PROCEDURE — 0502F SUBSEQUENT PRENATAL CARE: CPT | Performed by: OBSTETRICS & GYNECOLOGY

## 2022-10-31 ASSESSMENT — FIBROSIS 4 INDEX: FIB4 SCORE: 0.43

## 2022-10-31 NOTE — PROGRESS NOTES
CARLO:  31w5d  Vasa Previa    Pt reports doing well.  Denies vaginal bleeding, contractions, LOF.  Reports +FM. Has occasional BH CTXs and LBP. Strict labor prec discussed.   HAs an appt scheduled for US @ [facility] on  and then has appt with Dr Batista on . We discussed that she will not need both of those US and that she should discuss with Dr Randolph which one he wants her to keep as he is the primary OB provider and she has an ppt with him prior (). She is scheduled for c/s on  @ 36 4/7 weeks. She will discuss timing of/need for steroids with Dr Randolph.     /60   Wt 185 lb   BMI 28.98 kg/m²   gen: AAO, NAD  FHTs: 15   FH: 32    A/P: 32 y.o.  @ 31w5d   - PNL up to date, Rh+, glucola/3rd tri CBC.RPR WNL;   - Placenta previa with vasa previa  - strict PTL prec and bleeding prec for immediate evaluation to L&D  - All ?S answered    Reviewed labor precautions (to call or come to hospital for ctx q5min, VB, LOF, decreased FM)    RTC 2wks- scheduled with Dr Randolph

## 2022-11-14 ENCOUNTER — ROUTINE PRENATAL (OUTPATIENT)
Dept: OBGYN | Facility: CLINIC | Age: 32
End: 2022-11-14
Payer: COMMERCIAL

## 2022-11-14 VITALS — BODY MASS INDEX: 29.44 KG/M2 | DIASTOLIC BLOOD PRESSURE: 64 MMHG | WEIGHT: 188 LBS | SYSTOLIC BLOOD PRESSURE: 100 MMHG

## 2022-11-14 DIAGNOSIS — O44.03 PLACENTA PREVIA IN THIRD TRIMESTER: ICD-10-CM

## 2022-11-14 DIAGNOSIS — Z3A.33 33 WEEKS GESTATION OF PREGNANCY: ICD-10-CM

## 2022-11-14 PROCEDURE — 0502F SUBSEQUENT PRENATAL CARE: CPT | Performed by: OBSTETRICS & GYNECOLOGY

## 2022-11-14 ASSESSMENT — FIBROSIS 4 INDEX: FIB4 SCORE: 0.43

## 2022-11-14 NOTE — NON-PROVIDER
Pt here today for OB follow up  Pt denies cramping, bleeding or discharge   Reports +  Good # [phone] (home)   Pharmacy Confirmed.

## 2022-11-14 NOTE — PROGRESS NOTES
Chief complaint: OB follow-up     S: Pt presents for routine OB follow up. Good fetal movement.  No contractions, vaginal bleeding, or leakage of fluid.    Questions answered.    O: /64   Wt 188 lb   BMI 29.44 kg/m²   Patients' weight gain, fluid intake and exercise level discussed.  Vitals, fundal height , fetal position, and FHR reviewed on flowsheet    Lab:No results found for this or any previous visit (from the past 336 hour(s)).    A/P:  32 y.o.  at 33w5d presents for routine obstetric follow-up.  Size equals dates and/or scan    1.  Continue prenatal vitamins.  2.  Fetal kick counts.  3.  Exercise at least 30 minutes daily.  4.  Drink at least 2L of water daily  5.  Labor precautions educated.  6.  Follow-up in 2 weeks.  7.  GBS at next visit    Has appointment with maternal-fetal medicine on     Plan for  on     Discussed with the patient the risks of  delivery. The risks include DVT/pulmonary embolism, pelvic scarring, pelvic pain, infection, bleeding, scarring, damage to other organs in the area of operation, anesthesia complications, and death. Specifically organs that can be damaged are bowel, bladder, ureters. I also discussed with the patient the risk of wound infection and wound breakdown. We discussed that these risks are greater in people that have diabetes or obesity. I also discussed the risk of emergency blood transfusion during procedure as well as emergency hysterectomy during procedure. Patient had the opportunity to ask questions regarding procedures. All questions answered to the patient's satisfaction.

## 2022-11-17 ENCOUNTER — APPOINTMENT (OUTPATIENT)
Dept: RADIOLOGY | Facility: MEDICAL CENTER | Age: 32
End: 2022-11-17
Attending: OBSTETRICS & GYNECOLOGY
Payer: COMMERCIAL

## 2022-11-30 ENCOUNTER — PRE-ADMISSION TESTING (OUTPATIENT)
Dept: ADMISSIONS | Facility: MEDICAL CENTER | Age: 32
End: 2022-11-30
Attending: OBSTETRICS & GYNECOLOGY
Payer: COMMERCIAL

## 2022-11-30 ENCOUNTER — ROUTINE PRENATAL (OUTPATIENT)
Dept: OBGYN | Facility: CLINIC | Age: 32
End: 2022-11-30
Payer: COMMERCIAL

## 2022-11-30 ENCOUNTER — HOSPITAL ENCOUNTER (OUTPATIENT)
Facility: MEDICAL CENTER | Age: 32
End: 2022-11-30
Attending: OBSTETRICS & GYNECOLOGY
Payer: COMMERCIAL

## 2022-11-30 VITALS — WEIGHT: 190 LBS | DIASTOLIC BLOOD PRESSURE: 66 MMHG | SYSTOLIC BLOOD PRESSURE: 103 MMHG | BODY MASS INDEX: 29.76 KG/M2

## 2022-11-30 DIAGNOSIS — O44.03 PLACENTA PREVIA IN THIRD TRIMESTER: ICD-10-CM

## 2022-11-30 DIAGNOSIS — Z3A.36 36 WEEKS GESTATION OF PREGNANCY: ICD-10-CM

## 2022-11-30 PROCEDURE — 0502F SUBSEQUENT PRENATAL CARE: CPT | Performed by: OBSTETRICS & GYNECOLOGY

## 2022-11-30 PROCEDURE — 87150 DNA/RNA AMPLIFIED PROBE: CPT

## 2022-11-30 PROCEDURE — 87081 CULTURE SCREEN ONLY: CPT

## 2022-11-30 ASSESSMENT — FIBROSIS 4 INDEX: FIB4 SCORE: 0.43

## 2022-11-30 NOTE — PROGRESS NOTES
Chief complaint: Return OB visit     S: Pt presents for routine OB follow up. Good fetal movement.  No contractions, vaginal bleeding, or leakage of fluid.    Questions answered.    O: /66   Wt 190 lb   BMI 29.76 kg/m²   Patients' weight gain, fluid intake and exercise level discussed.  Vitals, fundal height , fetal position, and FHR reviewed on flowsheet    Lab:No results found for this or any previous visit (from the past 336 hour(s)).    A/P:  32 y.o.  at 36w0d presents for routine obstetric follow-up.  Size equals dates and/or scan    1.  Continue prenatal vitamins.  2.  Fetal kick counts.  3.  Exercise at least 30 minutes daily.  4.  Drink at least 2L of water daily  5.  Labor precautions educated.  6.  Follow-up in 1 weeks.  7.  GBS today    Plan for primary low-transverse  this weekend secondary to placenta previa.  Unsure of vasa previa at last ultrasound with MFM    Discussed with the patient the risks of  delivery. The risks include DVT/pulmonary embolism, pelvic scarring, pelvic pain, infection, bleeding, scarring, damage to other organs in the area of operation, anesthesia complications, and death. Specifically organs that can be damaged are bowel, bladder, ureters. I also discussed with the patient the risk of wound infection and wound breakdown. We discussed that these risks are greater in people that have diabetes or obesity. I also discussed the risk of emergency blood transfusion during procedure as well as emergency hysterectomy during procedure. Patient had the opportunity to ask questions regarding procedures. All questions answered to the patient's satisfaction. Pt agrees to proceed with surgery.     Patient asked to abstain from food and water for least 8 hours prior to surgery

## 2022-11-30 NOTE — NON-PROVIDER
Pt here today for OB follow up  Pt states daniel delgado   Reports +FM  Good # [phone]   Pharmacy Confirmed.

## 2022-12-02 LAB — GP B STREP DNA SPEC QL NAA+PROBE: POSITIVE

## 2022-12-04 ENCOUNTER — ANESTHESIA (OUTPATIENT)
Dept: OBGYN | Facility: MEDICAL CENTER | Age: 32
End: 2022-12-04
Payer: COMMERCIAL

## 2022-12-04 ENCOUNTER — ANESTHESIA EVENT (OUTPATIENT)
Dept: OBGYN | Facility: MEDICAL CENTER | Age: 32
End: 2022-12-04
Payer: COMMERCIAL

## 2022-12-04 ENCOUNTER — HOSPITAL ENCOUNTER (INPATIENT)
Facility: MEDICAL CENTER | Age: 32
LOS: 2 days | End: 2022-12-06
Attending: OBSTETRICS & GYNECOLOGY | Admitting: OBSTETRICS & GYNECOLOGY
Payer: COMMERCIAL

## 2022-12-04 DIAGNOSIS — G89.18 ACUTE POST-OPERATIVE PAIN: ICD-10-CM

## 2022-12-04 LAB
ABO GROUP BLD: NORMAL
BASOPHILS # BLD AUTO: 0.4 % (ref 0–1.8)
BASOPHILS # BLD AUTO: 0.5 % (ref 0–1.8)
BASOPHILS # BLD: 0.04 K/UL (ref 0–0.12)
BASOPHILS # BLD: 0.04 K/UL (ref 0–0.12)
BLD GP AB SCN SERPL QL: NORMAL
EOSINOPHIL # BLD AUTO: 0.04 K/UL (ref 0–0.51)
EOSINOPHIL # BLD AUTO: 0.06 K/UL (ref 0–0.51)
EOSINOPHIL NFR BLD: 0.5 % (ref 0–6.9)
EOSINOPHIL NFR BLD: 0.7 % (ref 0–6.9)
ERYTHROCYTE [DISTWIDTH] IN BLOOD BY AUTOMATED COUNT: 44.1 FL (ref 35.9–50)
ERYTHROCYTE [DISTWIDTH] IN BLOOD BY AUTOMATED COUNT: 44.6 FL (ref 35.9–50)
HCT VFR BLD AUTO: 34.9 % (ref 37–47)
HCT VFR BLD AUTO: 37.3 % (ref 37–47)
HGB BLD-MCNC: 11.7 G/DL (ref 12–16)
HGB BLD-MCNC: 12.7 G/DL (ref 12–16)
IMM GRANULOCYTES # BLD AUTO: 0.09 K/UL (ref 0–0.11)
IMM GRANULOCYTES # BLD AUTO: 0.13 K/UL (ref 0–0.11)
IMM GRANULOCYTES NFR BLD AUTO: 1.1 % (ref 0–0.9)
IMM GRANULOCYTES NFR BLD AUTO: 1.5 % (ref 0–0.9)
LYMPHOCYTES # BLD AUTO: 1.48 K/UL (ref 1–4.8)
LYMPHOCYTES # BLD AUTO: 1.61 K/UL (ref 1–4.8)
LYMPHOCYTES NFR BLD: 17.3 % (ref 22–41)
LYMPHOCYTES NFR BLD: 18.1 % (ref 22–41)
MCH RBC QN AUTO: 31.8 PG (ref 27–33)
MCH RBC QN AUTO: 31.8 PG (ref 27–33)
MCHC RBC AUTO-ENTMCNC: 33.5 G/DL (ref 33.6–35)
MCHC RBC AUTO-ENTMCNC: 34 G/DL (ref 33.6–35)
MCV RBC AUTO: 93.3 FL (ref 81.4–97.8)
MCV RBC AUTO: 94.8 FL (ref 81.4–97.8)
MONOCYTES # BLD AUTO: 0.61 K/UL (ref 0–0.85)
MONOCYTES # BLD AUTO: 0.65 K/UL (ref 0–0.85)
MONOCYTES NFR BLD AUTO: 7.1 % (ref 0–13.4)
MONOCYTES NFR BLD AUTO: 7.3 % (ref 0–13.4)
NEUTROPHILS # BLD AUTO: 6.3 K/UL (ref 2–7.15)
NEUTROPHILS # BLD AUTO: 6.41 K/UL (ref 2–7.15)
NEUTROPHILS NFR BLD: 72 % (ref 44–72)
NEUTROPHILS NFR BLD: 73.5 % (ref 44–72)
NRBC # BLD AUTO: 0 K/UL
NRBC # BLD AUTO: 0 K/UL
NRBC BLD-RTO: 0 /100 WBC
NRBC BLD-RTO: 0 /100 WBC
PLATELET # BLD AUTO: 201 K/UL (ref 164–446)
PLATELET # BLD AUTO: 225 K/UL (ref 164–446)
PMV BLD AUTO: 8.9 FL (ref 9–12.9)
PMV BLD AUTO: 9.1 FL (ref 9–12.9)
RBC # BLD AUTO: 3.68 M/UL (ref 4.2–5.4)
RBC # BLD AUTO: 4 M/UL (ref 4.2–5.4)
RH BLD: NORMAL
T PALLIDUM AB SER QL IA: NORMAL
WBC # BLD AUTO: 8.6 K/UL (ref 4.8–10.8)
WBC # BLD AUTO: 8.9 K/UL (ref 4.8–10.8)

## 2022-12-04 PROCEDURE — 86901 BLOOD TYPING SEROLOGIC RH(D): CPT

## 2022-12-04 PROCEDURE — 700102 HCHG RX REV CODE 250 W/ 637 OVERRIDE(OP): Performed by: ANESTHESIOLOGY

## 2022-12-04 PROCEDURE — 86900 BLOOD TYPING SEROLOGIC ABO: CPT

## 2022-12-04 PROCEDURE — 36415 COLL VENOUS BLD VENIPUNCTURE: CPT

## 2022-12-04 PROCEDURE — 160041 HCHG SURGERY MINUTES - EA ADDL 1 MIN LEVEL 4: Performed by: OBSTETRICS & GYNECOLOGY

## 2022-12-04 PROCEDURE — 85025 COMPLETE CBC W/AUTO DIFF WBC: CPT

## 2022-12-04 PROCEDURE — 160048 HCHG OR STATISTICAL LEVEL 1-5: Performed by: OBSTETRICS & GYNECOLOGY

## 2022-12-04 PROCEDURE — 700105 HCHG RX REV CODE 258: Performed by: ANESTHESIOLOGY

## 2022-12-04 PROCEDURE — 700111 HCHG RX REV CODE 636 W/ 250 OVERRIDE (IP): Performed by: ANESTHESIOLOGY

## 2022-12-04 PROCEDURE — A9270 NON-COVERED ITEM OR SERVICE: HCPCS | Performed by: ANESTHESIOLOGY

## 2022-12-04 PROCEDURE — 770002 HCHG ROOM/CARE - OB PRIVATE (112)

## 2022-12-04 PROCEDURE — 160009 HCHG ANES TIME/MIN: Performed by: OBSTETRICS & GYNECOLOGY

## 2022-12-04 PROCEDURE — 160029 HCHG SURGERY MINUTES - 1ST 30 MINS LEVEL 4: Performed by: OBSTETRICS & GYNECOLOGY

## 2022-12-04 PROCEDURE — C1755 CATHETER, INTRASPINAL: HCPCS | Performed by: OBSTETRICS & GYNECOLOGY

## 2022-12-04 PROCEDURE — 86780 TREPONEMA PALLIDUM: CPT

## 2022-12-04 PROCEDURE — 700111 HCHG RX REV CODE 636 W/ 250 OVERRIDE (IP): Performed by: OBSTETRICS & GYNECOLOGY

## 2022-12-04 PROCEDURE — 59510 CESAREAN DELIVERY: CPT | Performed by: OBSTETRICS & GYNECOLOGY

## 2022-12-04 PROCEDURE — 01961 ANES CESAREAN DELIVERY ONLY: CPT | Performed by: ANESTHESIOLOGY

## 2022-12-04 PROCEDURE — 160035 HCHG PACU - 1ST 60 MINS PHASE I: Performed by: OBSTETRICS & GYNECOLOGY

## 2022-12-04 PROCEDURE — 86850 RBC ANTIBODY SCREEN: CPT

## 2022-12-04 PROCEDURE — C1765 ADHESION BARRIER: HCPCS | Performed by: OBSTETRICS & GYNECOLOGY

## 2022-12-04 PROCEDURE — 700105 HCHG RX REV CODE 258: Performed by: OBSTETRICS & GYNECOLOGY

## 2022-12-04 PROCEDURE — 700101 HCHG RX REV CODE 250: Performed by: ANESTHESIOLOGY

## 2022-12-04 PROCEDURE — 160002 HCHG RECOVERY MINUTES (STAT): Performed by: OBSTETRICS & GYNECOLOGY

## 2022-12-04 RX ORDER — HYDROMORPHONE HYDROCHLORIDE 1 MG/ML
0.4 INJECTION, SOLUTION INTRAMUSCULAR; INTRAVENOUS; SUBCUTANEOUS
Status: ACTIVE | OUTPATIENT
Start: 2022-12-04 | End: 2022-12-05

## 2022-12-04 RX ORDER — HYDROMORPHONE HYDROCHLORIDE 1 MG/ML
0.2 INJECTION, SOLUTION INTRAMUSCULAR; INTRAVENOUS; SUBCUTANEOUS
Status: DISCONTINUED | OUTPATIENT
Start: 2022-12-04 | End: 2022-12-05

## 2022-12-04 RX ORDER — ACETAMINOPHEN 500 MG
1000 TABLET ORAL EVERY 6 HOURS
Status: DISCONTINUED | OUTPATIENT
Start: 2022-12-05 | End: 2022-12-06 | Stop reason: HOSPADM

## 2022-12-04 RX ORDER — METOCLOPRAMIDE HYDROCHLORIDE 5 MG/ML
10 INJECTION INTRAMUSCULAR; INTRAVENOUS ONCE
Status: COMPLETED | OUTPATIENT
Start: 2022-12-04 | End: 2022-12-04

## 2022-12-04 RX ORDER — ONDANSETRON 2 MG/ML
4 INJECTION INTRAMUSCULAR; INTRAVENOUS
Status: DISCONTINUED | OUTPATIENT
Start: 2022-12-04 | End: 2022-12-05

## 2022-12-04 RX ORDER — ONDANSETRON 2 MG/ML
INJECTION INTRAMUSCULAR; INTRAVENOUS PRN
Status: DISCONTINUED | OUTPATIENT
Start: 2022-12-04 | End: 2022-12-04 | Stop reason: SURG

## 2022-12-04 RX ORDER — ACETAMINOPHEN 500 MG
1000 TABLET ORAL EVERY 6 HOURS
Status: COMPLETED | OUTPATIENT
Start: 2022-12-04 | End: 2022-12-05

## 2022-12-04 RX ORDER — DIPHENHYDRAMINE HYDROCHLORIDE 50 MG/ML
25 INJECTION INTRAMUSCULAR; INTRAVENOUS EVERY 6 HOURS PRN
Status: ACTIVE | OUTPATIENT
Start: 2022-12-04 | End: 2022-12-05

## 2022-12-04 RX ORDER — HYDROMORPHONE HYDROCHLORIDE 1 MG/ML
0.4 INJECTION, SOLUTION INTRAMUSCULAR; INTRAVENOUS; SUBCUTANEOUS
Status: DISCONTINUED | OUTPATIENT
Start: 2022-12-04 | End: 2022-12-05

## 2022-12-04 RX ORDER — HYDROMORPHONE HYDROCHLORIDE 1 MG/ML
0.2 INJECTION, SOLUTION INTRAMUSCULAR; INTRAVENOUS; SUBCUTANEOUS
Status: ACTIVE | OUTPATIENT
Start: 2022-12-04 | End: 2022-12-05

## 2022-12-04 RX ORDER — DIPHENHYDRAMINE HYDROCHLORIDE 50 MG/ML
12.5 INJECTION INTRAMUSCULAR; INTRAVENOUS
Status: DISCONTINUED | OUTPATIENT
Start: 2022-12-04 | End: 2022-12-05

## 2022-12-04 RX ORDER — CEFAZOLIN SODIUM 1 G/3ML
2 INJECTION, POWDER, FOR SOLUTION INTRAMUSCULAR; INTRAVENOUS ONCE
Status: DISCONTINUED | OUTPATIENT
Start: 2022-12-04 | End: 2022-12-04

## 2022-12-04 RX ORDER — DOCUSATE SODIUM 100 MG/1
100 CAPSULE, LIQUID FILLED ORAL 2 TIMES DAILY PRN
Status: DISCONTINUED | OUTPATIENT
Start: 2022-12-04 | End: 2022-12-06 | Stop reason: HOSPADM

## 2022-12-04 RX ORDER — SODIUM CHLORIDE, SODIUM LACTATE, POTASSIUM CHLORIDE, CALCIUM CHLORIDE 600; 310; 30; 20 MG/100ML; MG/100ML; MG/100ML; MG/100ML
INJECTION, SOLUTION INTRAVENOUS CONTINUOUS
Status: DISCONTINUED | OUTPATIENT
Start: 2022-12-04 | End: 2022-12-05

## 2022-12-04 RX ORDER — CEFAZOLIN SODIUM 1 G/3ML
INJECTION, POWDER, FOR SOLUTION INTRAMUSCULAR; INTRAVENOUS PRN
Status: DISCONTINUED | OUTPATIENT
Start: 2022-12-04 | End: 2022-12-04 | Stop reason: SURG

## 2022-12-04 RX ORDER — SODIUM CHLORIDE, SODIUM GLUCONATE, SODIUM ACETATE, POTASSIUM CHLORIDE AND MAGNESIUM CHLORIDE 526; 502; 368; 37; 30 MG/100ML; MG/100ML; MG/100ML; MG/100ML; MG/100ML
INJECTION, SOLUTION INTRAVENOUS
Status: DISCONTINUED | OUTPATIENT
Start: 2022-12-04 | End: 2022-12-04 | Stop reason: SURG

## 2022-12-04 RX ORDER — OXYCODONE HCL 5 MG/5 ML
5 SOLUTION, ORAL ORAL
Status: DISCONTINUED | OUTPATIENT
Start: 2022-12-04 | End: 2022-12-05

## 2022-12-04 RX ORDER — ACETAMINOPHEN 500 MG
1000 TABLET ORAL EVERY 6 HOURS PRN
Status: DISCONTINUED | OUTPATIENT
Start: 2022-12-08 | End: 2022-12-06 | Stop reason: HOSPADM

## 2022-12-04 RX ORDER — KETOROLAC TROMETHAMINE 30 MG/ML
INJECTION, SOLUTION INTRAMUSCULAR; INTRAVENOUS PRN
Status: DISCONTINUED | OUTPATIENT
Start: 2022-12-04 | End: 2022-12-04 | Stop reason: SURG

## 2022-12-04 RX ORDER — HYDROMORPHONE HYDROCHLORIDE 1 MG/ML
0.1 INJECTION, SOLUTION INTRAMUSCULAR; INTRAVENOUS; SUBCUTANEOUS
Status: DISCONTINUED | OUTPATIENT
Start: 2022-12-04 | End: 2022-12-05

## 2022-12-04 RX ORDER — SODIUM CHLORIDE, SODIUM LACTATE, POTASSIUM CHLORIDE, CALCIUM CHLORIDE 600; 310; 30; 20 MG/100ML; MG/100ML; MG/100ML; MG/100ML
INJECTION, SOLUTION INTRAVENOUS PRN
Status: DISCONTINUED | OUTPATIENT
Start: 2022-12-04 | End: 2022-12-06 | Stop reason: HOSPADM

## 2022-12-04 RX ORDER — KETOROLAC TROMETHAMINE 30 MG/ML
30 INJECTION, SOLUTION INTRAMUSCULAR; INTRAVENOUS EVERY 6 HOURS
Status: DISPENSED | OUTPATIENT
Start: 2022-12-04 | End: 2022-12-05

## 2022-12-04 RX ORDER — ONDANSETRON 2 MG/ML
4 INJECTION INTRAMUSCULAR; INTRAVENOUS EVERY 6 HOURS PRN
Status: DISCONTINUED | OUTPATIENT
Start: 2022-12-05 | End: 2022-12-06 | Stop reason: HOSPADM

## 2022-12-04 RX ORDER — BUPIVACAINE HYDROCHLORIDE 7.5 MG/ML
INJECTION, SOLUTION INTRASPINAL PRN
Status: DISCONTINUED | OUTPATIENT
Start: 2022-12-04 | End: 2022-12-04 | Stop reason: SURG

## 2022-12-04 RX ORDER — SODIUM CHLORIDE, SODIUM LACTATE, POTASSIUM CHLORIDE, CALCIUM CHLORIDE 600; 310; 30; 20 MG/100ML; MG/100ML; MG/100ML; MG/100ML
INJECTION, SOLUTION INTRAVENOUS ONCE
Status: ACTIVE | OUTPATIENT
Start: 2022-12-04 | End: 2022-12-05

## 2022-12-04 RX ORDER — OXYCODONE HCL 5 MG/5 ML
10 SOLUTION, ORAL ORAL
Status: DISCONTINUED | OUTPATIENT
Start: 2022-12-04 | End: 2022-12-05

## 2022-12-04 RX ORDER — SODIUM CHLORIDE, SODIUM GLUCONATE, SODIUM ACETATE, POTASSIUM CHLORIDE AND MAGNESIUM CHLORIDE 526; 502; 368; 37; 30 MG/100ML; MG/100ML; MG/100ML; MG/100ML; MG/100ML
1500 INJECTION, SOLUTION INTRAVENOUS ONCE
Status: COMPLETED | OUTPATIENT
Start: 2022-12-04 | End: 2022-12-04

## 2022-12-04 RX ORDER — PHENYLEPHRINE HCL IN 0.9% NACL 0.5 MG/5ML
SYRINGE (ML) INTRAVENOUS PRN
Status: DISCONTINUED | OUTPATIENT
Start: 2022-12-04 | End: 2022-12-04 | Stop reason: SURG

## 2022-12-04 RX ORDER — MEPERIDINE HYDROCHLORIDE 25 MG/ML
6.25 INJECTION INTRAMUSCULAR; INTRAVENOUS; SUBCUTANEOUS
Status: DISCONTINUED | OUTPATIENT
Start: 2022-12-04 | End: 2022-12-05

## 2022-12-04 RX ORDER — CLINDAMYCIN PHOSPHATE 900 MG/50ML
900 INJECTION, SOLUTION INTRAVENOUS ONCE
Status: DISCONTINUED | OUTPATIENT
Start: 2022-12-04 | End: 2022-12-04 | Stop reason: HOSPADM

## 2022-12-04 RX ORDER — OXYCODONE HYDROCHLORIDE 5 MG/1
5 TABLET ORAL EVERY 4 HOURS PRN
Status: DISCONTINUED | OUTPATIENT
Start: 2022-12-05 | End: 2022-12-06 | Stop reason: HOSPADM

## 2022-12-04 RX ORDER — LABETALOL HYDROCHLORIDE 5 MG/ML
5 INJECTION, SOLUTION INTRAVENOUS
Status: DISCONTINUED | OUTPATIENT
Start: 2022-12-04 | End: 2022-12-05

## 2022-12-04 RX ORDER — IBUPROFEN 800 MG/1
800 TABLET ORAL EVERY 8 HOURS PRN
Status: DISCONTINUED | OUTPATIENT
Start: 2022-12-08 | End: 2022-12-06 | Stop reason: HOSPADM

## 2022-12-04 RX ORDER — DIPHENHYDRAMINE HCL 25 MG
25 TABLET ORAL EVERY 6 HOURS PRN
Status: DISCONTINUED | OUTPATIENT
Start: 2022-12-05 | End: 2022-12-06 | Stop reason: HOSPADM

## 2022-12-04 RX ORDER — DIPHENHYDRAMINE HYDROCHLORIDE 50 MG/ML
25 INJECTION INTRAMUSCULAR; INTRAVENOUS EVERY 6 HOURS PRN
Status: DISCONTINUED | OUTPATIENT
Start: 2022-12-05 | End: 2022-12-06 | Stop reason: HOSPADM

## 2022-12-04 RX ORDER — OXYCODONE HYDROCHLORIDE 10 MG/1
10 TABLET ORAL EVERY 4 HOURS PRN
Status: ACTIVE | OUTPATIENT
Start: 2022-12-04 | End: 2022-12-05

## 2022-12-04 RX ORDER — OXYCODONE HYDROCHLORIDE 10 MG/1
10 TABLET ORAL EVERY 4 HOURS PRN
Status: DISCONTINUED | OUTPATIENT
Start: 2022-12-05 | End: 2022-12-06 | Stop reason: HOSPADM

## 2022-12-04 RX ORDER — MIDAZOLAM HYDROCHLORIDE 1 MG/ML
1 INJECTION INTRAMUSCULAR; INTRAVENOUS
Status: DISCONTINUED | OUTPATIENT
Start: 2022-12-04 | End: 2022-12-05

## 2022-12-04 RX ORDER — ONDANSETRON 4 MG/1
4 TABLET, ORALLY DISINTEGRATING ORAL EVERY 6 HOURS PRN
Status: DISCONTINUED | OUTPATIENT
Start: 2022-12-05 | End: 2022-12-06 | Stop reason: HOSPADM

## 2022-12-04 RX ORDER — MORPHINE SULFATE 0.5 MG/ML
INJECTION, SOLUTION EPIDURAL; INTRATHECAL; INTRAVENOUS PRN
Status: DISCONTINUED | OUTPATIENT
Start: 2022-12-04 | End: 2022-12-04 | Stop reason: SURG

## 2022-12-04 RX ORDER — DIPHENHYDRAMINE HYDROCHLORIDE 50 MG/ML
12.5 INJECTION INTRAMUSCULAR; INTRAVENOUS EVERY 6 HOURS PRN
Status: ACTIVE | OUTPATIENT
Start: 2022-12-04 | End: 2022-12-05

## 2022-12-04 RX ORDER — OXYCODONE HYDROCHLORIDE 5 MG/1
5 TABLET ORAL EVERY 4 HOURS PRN
Status: ACTIVE | OUTPATIENT
Start: 2022-12-04 | End: 2022-12-05

## 2022-12-04 RX ORDER — IBUPROFEN 800 MG/1
800 TABLET ORAL EVERY 8 HOURS
Status: DISCONTINUED | OUTPATIENT
Start: 2022-12-05 | End: 2022-12-06 | Stop reason: HOSPADM

## 2022-12-04 RX ORDER — ONDANSETRON 2 MG/ML
4 INJECTION INTRAMUSCULAR; INTRAVENOUS EVERY 6 HOURS PRN
Status: ACTIVE | OUTPATIENT
Start: 2022-12-04 | End: 2022-12-05

## 2022-12-04 RX ORDER — OXYTOCIN 10 [USP'U]/ML
10 INJECTION, SOLUTION INTRAMUSCULAR; INTRAVENOUS
Status: DISCONTINUED | OUTPATIENT
Start: 2022-12-04 | End: 2022-12-06 | Stop reason: HOSPADM

## 2022-12-04 RX ORDER — HYDRALAZINE HYDROCHLORIDE 20 MG/ML
5 INJECTION INTRAMUSCULAR; INTRAVENOUS
Status: DISCONTINUED | OUTPATIENT
Start: 2022-12-04 | End: 2022-12-05

## 2022-12-04 RX ORDER — SCOLOPAMINE TRANSDERMAL SYSTEM 1 MG/1
PATCH, EXTENDED RELEASE TRANSDERMAL PRN
Status: DISCONTINUED | OUTPATIENT
Start: 2022-12-04 | End: 2022-12-04 | Stop reason: SURG

## 2022-12-04 RX ORDER — CITRIC ACID/SODIUM CITRATE 334-500MG
30 SOLUTION, ORAL ORAL ONCE
Status: COMPLETED | OUTPATIENT
Start: 2022-12-04 | End: 2022-12-04

## 2022-12-04 RX ORDER — HALOPERIDOL 5 MG/ML
1 INJECTION INTRAMUSCULAR
Status: DISCONTINUED | OUTPATIENT
Start: 2022-12-04 | End: 2022-12-05

## 2022-12-04 RX ADMIN — OXYTOCIN 125 ML/HR: 10 INJECTION, SOLUTION INTRAMUSCULAR; INTRAVENOUS at 12:50

## 2022-12-04 RX ADMIN — SODIUM CHLORIDE, POTASSIUM CHLORIDE, SODIUM LACTATE AND CALCIUM CHLORIDE: 600; 310; 30; 20 INJECTION, SOLUTION INTRAVENOUS at 14:24

## 2022-12-04 RX ADMIN — Medication 200 MCG: at 12:32

## 2022-12-04 RX ADMIN — FENTANYL CITRATE 10 MCG: 50 INJECTION, SOLUTION INTRAMUSCULAR; INTRAVENOUS at 12:25

## 2022-12-04 RX ADMIN — Medication 200 MCG: at 12:38

## 2022-12-04 RX ADMIN — KETOROLAC TROMETHAMINE 30 MG: 30 INJECTION, SOLUTION INTRAMUSCULAR at 13:05

## 2022-12-04 RX ADMIN — BUPIVACAINE HYDROCHLORIDE IN DEXTROSE 1.5 ML: 7.5 INJECTION, SOLUTION SUBARACHNOID at 12:25

## 2022-12-04 RX ADMIN — METOCLOPRAMIDE 10 MG: 5 INJECTION, SOLUTION INTRAMUSCULAR; INTRAVENOUS at 09:12

## 2022-12-04 RX ADMIN — SODIUM CITRATE AND CITRIC ACID MONOHYDRATE 30 ML: 500; 334 SOLUTION ORAL at 09:47

## 2022-12-04 RX ADMIN — OXYTOCIN 10 UNITS: 10 INJECTION, SOLUTION INTRAMUSCULAR; INTRAVENOUS at 12:45

## 2022-12-04 RX ADMIN — SCOPALAMINE 1 PATCH: 1 PATCH, EXTENDED RELEASE TRANSDERMAL at 12:26

## 2022-12-04 RX ADMIN — SODIUM CHLORIDE, SODIUM GLUCONATE, SODIUM ACETATE, POTASSIUM CHLORIDE AND MAGNESIUM CHLORIDE 1500 ML: 526; 502; 368; 37; 30 INJECTION, SOLUTION INTRAVENOUS at 08:23

## 2022-12-04 RX ADMIN — SODIUM CHLORIDE, SODIUM GLUCONATE, SODIUM ACETATE, POTASSIUM CHLORIDE AND MAGNESIUM CHLORIDE: 526; 502; 368; 37; 30 INJECTION, SOLUTION INTRAVENOUS at 12:22

## 2022-12-04 RX ADMIN — ONDANSETRON 4 MG: 2 INJECTION INTRAMUSCULAR; INTRAVENOUS at 13:05

## 2022-12-04 RX ADMIN — KETOROLAC TROMETHAMINE 30 MG: 30 INJECTION, SOLUTION INTRAMUSCULAR; INTRAVENOUS at 23:13

## 2022-12-04 RX ADMIN — ACETAMINOPHEN 1000 MG: 500 TABLET ORAL at 22:17

## 2022-12-04 RX ADMIN — FAMOTIDINE 20 MG: 10 INJECTION, SOLUTION INTRAVENOUS at 09:12

## 2022-12-04 RX ADMIN — KETOROLAC TROMETHAMINE 30 MG: 30 INJECTION, SOLUTION INTRAMUSCULAR; INTRAVENOUS at 17:51

## 2022-12-04 RX ADMIN — ACETAMINOPHEN 1000 MG: 500 TABLET ORAL at 16:19

## 2022-12-04 RX ADMIN — EPHEDRINE SULFATE 25 MG: 50 INJECTION, SOLUTION INTRAVENOUS at 12:27

## 2022-12-04 RX ADMIN — Medication 200 MCG: at 12:27

## 2022-12-04 RX ADMIN — CEFAZOLIN 2 G: 330 INJECTION, POWDER, FOR SOLUTION INTRAMUSCULAR; INTRAVENOUS at 12:22

## 2022-12-04 RX ADMIN — MORPHINE SULFATE 100 MCG: 0.5 INJECTION, SOLUTION EPIDURAL; INTRATHECAL; INTRAVENOUS at 12:25

## 2022-12-04 ASSESSMENT — EDINBURGH POSTNATAL DEPRESSION SCALE (EPDS)
I HAVE BEEN SO UNHAPPY THAT I HAVE BEEN CRYING: NO, NEVER
THE THOUGHT OF HARMING MYSELF HAS OCCURRED TO ME: NEVER
THINGS HAVE BEEN GETTING ON TOP OF ME: NO, I HAVE BEEN COPING AS WELL AS EVER
I HAVE BEEN ANXIOUS OR WORRIED FOR NO GOOD REASON: NO, NOT AT ALL
I HAVE LOOKED FORWARD WITH ENJOYMENT TO THINGS: AS MUCH AS I EVER DID
I HAVE BEEN SO UNHAPPY THAT I HAVE HAD DIFFICULTY SLEEPING: NOT AT ALL
I HAVE BLAMED MYSELF UNNECESSARILY WHEN THINGS WENT WRONG: NO, NEVER
I HAVE BEEN ABLE TO LAUGH AND SEE THE FUNNY SIDE OF THINGS: AS MUCH AS I ALWAYS COULD
I HAVE FELT SCARED OR PANICKY FOR NO GOOD REASON: NO, NOT AT ALL
I HAVE FELT SAD OR MISERABLE: NO, NOT AT ALL

## 2022-12-04 ASSESSMENT — PATIENT HEALTH QUESTIONNAIRE - PHQ9
2. FEELING DOWN, DEPRESSED, IRRITABLE, OR HOPELESS: NOT AT ALL
SUM OF ALL RESPONSES TO PHQ9 QUESTIONS 1 AND 2: 0
1. LITTLE INTEREST OR PLEASURE IN DOING THINGS: NOT AT ALL
2. FEELING DOWN, DEPRESSED, IRRITABLE, OR HOPELESS: NOT AT ALL
1. LITTLE INTEREST OR PLEASURE IN DOING THINGS: NOT AT ALL
SUM OF ALL RESPONSES TO PHQ9 QUESTIONS 1 AND 2: 0

## 2022-12-04 ASSESSMENT — PAIN DESCRIPTION - PAIN TYPE
TYPE: ACUTE PAIN
TYPE: SURGICAL PAIN

## 2022-12-04 ASSESSMENT — LIFESTYLE VARIABLES: EVER_SMOKED: NEVER

## 2022-12-04 ASSESSMENT — PAIN SCALES - GENERAL: PAIN_LEVEL: 0

## 2022-12-04 ASSESSMENT — FIBROSIS 4 INDEX: FIB4 SCORE: 0.43

## 2022-12-04 NOTE — ANESTHESIA POSTPROCEDURE EVALUATION
Patient: Osito Rivera    Procedure Summary     Date: 22 Room / Location: [facility] OR 01 / SURGERY LABOR AND DELIVERY    Anesthesia Start: 1222 Anesthesia Stop: 1321    Procedure:  SECTION, PRIMARY (Abdomen) Diagnosis:       Labor and delivery, indication for care      (36.4 Intrauterine pregnancy, placenta Previa)    Surgeons: Kota Randolph M.D. Responsible Provider: Niya Beauchamp M.D.    Anesthesia Type: spinal ASA Status: 2          Final Anesthesia Type: spinal  Last vitals  BP   Blood Pressure: (!) 97/51    Temp   36.1 °C (96.9 °F)    Pulse   99   Resp   18    SpO2   93 %      Anesthesia Post Evaluation    Patient location during evaluation: PACU  Patient participation: complete - patient participated  Level of consciousness: awake and alert  Pain score: 0    Airway patency: patent  Anesthetic complications: no  Cardiovascular status: adequate  Respiratory status: acceptable  Hydration status: acceptable    PONV: none          No notable events documented.     Nurse Pain Score: 0 (NPRS)

## 2022-12-04 NOTE — PROGRESS NOTES
0730 - 33 y/o  EDC 22, EGA 36.4, here to LDA 5 with , Oleg. She is here for a scheduled C/S d/t placenta previa and possible vasa previa.  EFM/TOCO applied, Patient states positive FM. Denies vaginal LOF or Bleeding. VSS.   0900 - Dr Randolph to bedside to discuss procedure.   0925 - Dr Beauchamp to bedside to discuss plan of care.   0949 - Pt ambulated to OR for scheduled c/s.   1000 - RN notified that d/t unforeseen emergency on unit, will postpone C/s at this time. Spinal anesthesia never placed. Pt ambulated back to LDA 5.   1008 - EFM and Hiram placed back on patient.   1215 - Pt ambulated to OR for scheduled c/s.  1325 - Pt transferred to recovery room. Report received from Dr Beauchamp. Pt stable

## 2022-12-04 NOTE — H&P
History and Physical    Osito Rivera is a 32 y.o. female  at 36w4d who presents for primary     Subjective: 32-year-old  2 para 1-0-0-1 at 36 weeks 4 days gestational age.  Pregnancy complicated by placenta previa.  Presents for primary   CTXs: negative   Pain: negative  LOF: negative  Vaginal bleeding: negative   Fetal movement: negative    ROS: Pertinent positives documented in HPI and all other systems reviewed & are negative    OB History    Para Term  AB Living   2 1 1     1   SAB IAB Ectopic Molar Multiple Live Births           0 1      # Outcome Date GA Lbr Neal/2nd Weight Sex Delivery Anes PTL Lv   2 Current            1 Term 21 38w4d / 01:26 3.21 kg (7 lb 1.2 oz) F Vag-Spont None N HOA       PGYNHx: None    Past Medical History:   Diagnosis Date    Asthma     Hypotension     Pregnant 2022    Thyroid disease        No past surgical history on file.      Current Facility-Administered Medications:     lactated ringers (LR) infusion, , Intravenous, Continuous, Kota Randolph M.D.    LR infusion, , Intravenous, Once, Kota Randolph M.D.    oxytocin (PITOCIN) infusion (for post delivery), 500 mL, Intravenous, Once **FOLLOWED BY** oxytocin (Pitocin) infusion (for postpartum), 125 mL/hr, Intravenous, Continuous, Kota Randolph M.D.    oxytocin (PITOCIN) injection 10 Units, 10 Units, Intramuscular, Once PRN, Kota Randolph M.D.    gentamicin (GARAMYCIN) 300 mg in syringe 7.5 mL, 5 mg/kg (Ideal), Intravenous, Once, Kota Randolph M.D.    clindamycin (Cleocin) IVPB premix 900 mg, 900 mg, Intravenous, Once, Kota Randolph M.D.    Na citrate-citric acid (BICITRA) 500-334 MG/5ML solution 30 mL, 30 mL, Oral, Once, Niya Beauchamp M.D.    Allergies: Penicillins    Social History     Socioeconomic History    Marital status:      Spouse name: Not on file    Number of children: Not on file    Years of education: Not on  "file    Highest education level: Not on file   Occupational History    Not on file   Tobacco Use    Smoking status: Never    Smokeless tobacco: Never   Vaping Use    Vaping Use: Never used   Substance and Sexual Activity    Alcohol use: Not Currently     Comment: rarely    Drug use: No    Sexual activity: Yes     Partners: Male     Comment: , one children, pregnant. Planned pregnancy. FOB is involved and supportive.   Other Topics Concern    Not on file   Social History Narrative    Not on file     Social Determinants of Health     Financial Resource Strain: Not on file   Food Insecurity: Not on file   Transportation Needs: Not on file   Physical Activity: Not on file   Stress: Not on file   Social Connections: Not on file   Intimate Partner Violence: Not on file   Housing Stability: Not on file         FamHx: Noncontributory    Prenatal care with [facility] with following problems:  Patient Active Problem List    Diagnosis Date Noted    Labor and delivery indication for care or intervention 12/04/2022    Placenta previa in third trimester 11/14/2022    Vasa previa 10/05/2022    Pregnancy, supervision, high-risk, third trimester 10/05/2022    36 weeks gestation of pregnancy 05/23/2022    Lip swelling 01/30/2020    Skin irritation 01/30/2020    Hypothyroidism, acquired, autoimmune 09/06/2019    Annual physical exam 11/02/2018    Hashimoto's thyroiditis 09/15/2017    Vitamin D deficiency 09/15/2017         Objective:      /69   Pulse (!) 107   Temp 37.1 °C (98.7 °F) (Temporal)   Ht 1.676 m (5' 6\")   Wt 86.2 kg (190 lb)   SpO2 94%     General:   no acute distress, alert, cooperative   Skin:   normal   HEENT:  PERRLA, EOMI, and Sclera clear, anicteric   Lungs:   CTA bilateral   Heart:   S1, S2 normal, no murmur, click, rub or gallop, regular rate and rhythm, chest is clear without rales or wheezing, no pedal edema, S1, S2 normal, no murmur, regular rate and rhythm   Abdomen:   soft, gravid, " NT   EFW: 2900g   Pelvis:  adequate with gynecoid pelvis   FHT: 140 BPM  Accels present  Decels negative  Variability moderate  Category 1   Uterine Size: Size equals dates   Presentations: Cephalic   Cervix:     Dilation:     Effacement:     Station:      Consistency:     Position:      Lab Review  Lab:   Blood type: O     Recent Results (from the past 5880 hour(s))   TSH+FREE T4    Collection Time: 04/26/22  8:40 AM   Result Value Ref Range    TSH 0.547 0.450 - 4.500 uIU/mL    Free T-4 1.44 0.82 - 1.77 ng/dL   T3 FREE    Collection Time: 04/26/22  8:40 AM   Result Value Ref Range    T3,Free 2.9 2.0 - 4.4 pg/mL   POCT Pregnancy    Collection Time: 06/16/22  3:00 PM   Result Value Ref Range    POC Urine Pregnancy Test POS Negative    Internal Control Positive Positive     Internal Control Negative Negative    CDC/D/PLT+RPR+RH+ABO+RUB AB.    Collection Time: 06/21/22  8:17 AM   Result Value Ref Range    Hepatitis B Surface Antigen Negative Negative    Rapid Plasma Reagin -Rpr- Non Reactive Non Reactive    Rubella IgG Antibody 2.53 Immune >0.99 index    ABO Grouping Only O     Rh Grouping Only Positive     Antibody Screen Scrn Negative Negative    HIV Screen 4th Gen. wRfx Non Reactive Non Reactive    WBC 8.3 3.4 - 10.8 x10E3/uL    RBC 4.12 3.77 - 5.28 x10E6/uL    Hemoglobin 13.0 11.1 - 15.9 g/dL    Hematocrit 38.6 34.0 - 46.6 %    MCV 94 79 - 97 fL    MCH 31.6 26.6 - 33.0 pg    MCHC 33.7 31.5 - 35.7 g/dL    RDW 12.5 11.7 - 15.4 %    Platelet Count 269 150 - 450 x10E3/uL    Neutrophils-Polys 66 Not Estab. %    Lymphocytes 27 Not Estab. %    Monocytes 6 Not Estab. %    Eosinophils 1 Not Estab. %    Basophils 0 Not Estab. %    Immature Cells CANCELED     Neutrophils (Absolute) 5.4 1.4 - 7.0 x10E3/uL    Lymphs (Absolute) 2.2 0.7 - 3.1 x10E3/uL    Monos (Absolute) 0.5 0.1 - 0.9 x10E3/uL    Eos (Absolute) 0.1 0.0 - 0.4 x10E3/uL    Baso (Absolute) 0.0 0.0 - 0.2 x10E3/uL    Immature Granulocytes 0 Not Estab. %    Immature  Granulocytes (abs) 0.0 0.0 - 0.1 x10E3/uL    Nucleated RBC CANCELED     Comments-Diff CANCELED     Specific Gravity 1.009 1.005 - 1.030    Ph 6.0 5.0 - 7.5    Color Yellow Yellow    Character Cloudy (A) Clear    Leukocyte Esterase Negative Negative    Protein Negative Negative/Trace    Glucose Negative Negative    Ketones Negative Negative    Occult Blood Negative Negative    Bilirubin Negative Negative    Urobilinogen Semi Qnt 0.2 0.2 - 1.0 mg/dL    Nitrite Negative Negative    Microscopic Exam Comment     Microscopic Exam See below:     Urinalysis Reflex Comment    MICROSCOPIC EXAMINATION    Collection Time: 06/21/22  8:17 AM   Result Value Ref Range    WBC None seen 0 - 5 /hpf    RBC None seen 0 - 2 /hpf    Epithelial Cells Non Renal 0-10 0 - 10 /hpf    Epithelial Cells Renal CANCELED     Urine Casts None seen None seen /lpf    Cast Type CANCELED     Urine Crystals CANCELED     Crystal Type CANCELED     Mucous Threads CANCELED     Bacteria None seen None seen/Few    Yeast Cells CANCELED     Trichomonas CANCELED     Comment CANCELED    THYROXINE (T4)    Collection Time: 06/21/22  8:17 AM   Result Value Ref Range    Free T-4 1.46 0.82 - 1.77 ng/dL   TSH    Collection Time: 06/21/22  8:17 AM   Result Value Ref Range    TSH 0.215 (L) 0.450 - 4.500 uIU/mL   T3 FREE    Collection Time: 06/21/22  8:17 AM   Result Value Ref Range    T3,Free 3.0 2.0 - 4.4 pg/mL   THYROXINE (T4)    Collection Time: 07/26/22  7:59 AM   Result Value Ref Range    Free T-4 1.29 0.82 - 1.77 ng/dL   TSH    Collection Time: 07/26/22  7:59 AM   Result Value Ref Range    TSH 0.330 (L) 0.450 - 4.500 uIU/mL   T3 FREE    Collection Time: 07/26/22  7:59 AM   Result Value Ref Range    T3,Free 2.7 2.0 - 4.4 pg/mL   COMP METABOLIC PANEL    Collection Time: 08/10/22  4:04 AM   Result Value Ref Range    Glucose 84 65 - 99 mg/dL    Bun 8 6 - 20 mg/dL    Creatinine 0.57 0.57 - 1.00 mg/dL    eGFR 125 >59 mL/min/1.73    Bun-Creatinine Ratio 14 9 - 23    Sodium  136 134 - 144 mmol/L    Potassium 3.4 (L) 3.5 - 5.2 mmol/L    Chloride 100 96 - 106 mmol/L    Co2 21 20 - 29 mmol/L    Calcium 8.4 (L) 8.7 - 10.2 mg/dL    Total Protein 6.6 6.0 - 8.5 g/dL    Albumin 3.7 (L) 3.9 - 5.0 g/dL    Globulin 2.9 1.5 - 4.5 g/dL    A-G Ratio 1.3 1.2 - 2.2    Total Bilirubin 0.3 0.0 - 1.2 mg/dL    Alkaline Phosphatase 56 44 - 121 IU/L    AST(SGOT) 28 0 - 40 IU/L    ALT(SGPT) 53 (H) 0 - 32 IU/L   THYROXINE (T4)    Collection Time: 08/25/22  4:18 AM   Result Value Ref Range    Free T-4 1.05 0.82 - 1.77 ng/dL   TSH    Collection Time: 08/25/22  4:18 AM   Result Value Ref Range    TSH 0.738 0.450 - 4.500 uIU/mL   T3 FREE    Collection Time: 08/25/22  4:18 AM   Result Value Ref Range    T3,Free 2.4 2.0 - 4.4 pg/mL   THYROXINE (T4)    Collection Time: 09/21/22  8:13 AM   Result Value Ref Range    Free T-4 1.04 0.82 - 1.77 ng/dL   TSH    Collection Time: 09/21/22  8:13 AM   Result Value Ref Range    TSH 0.487 0.450 - 4.500 uIU/mL   T3 FREE    Collection Time: 09/21/22  8:13 AM   Result Value Ref Range    T3,Free 2.5 2.0 - 4.4 pg/mL   CBC WITHOUT DIFFERENTIAL    Collection Time: 10/06/22 10:38 AM   Result Value Ref Range    WBC 10.3 4.8 - 10.8 K/uL    RBC 3.99 (L) 4.20 - 5.40 M/uL    Hemoglobin 13.1 12.0 - 16.0 g/dL    Hematocrit 38.9 37.0 - 47.0 %    MCV 97.5 81.4 - 97.8 fL    MCH 32.8 27.0 - 33.0 pg    MCHC 33.7 33.6 - 35.0 g/dL    RDW 48.1 35.9 - 50.0 fL    Platelet Count 267 164 - 446 K/uL    MPV 9.2 9.0 - 12.9 fL   T.PALLIDUM AB BRITTNY (SCREENING)    Collection Time: 10/06/22 10:38 AM   Result Value Ref Range    Syphilis, Treponemal Qual Non-Reactive Non-Reactive   GLUCOSE 1HR GESTATIONAL    Collection Time: 10/06/22 10:39 AM   Result Value Ref Range    Glucose, Post Dose 84 70 - 139 mg/dL   THYROXINE (T4)    Collection Time: 10/25/22  8:15 AM   Result Value Ref Range    Free T-4 1.12 0.82 - 1.77 ng/dL   TSH    Collection Time: 10/25/22  8:15 AM   Result Value Ref Range    TSH 0.380 (L) 0.450 -  4.500 uIU/mL   T3 FREE    Collection Time: 10/25/22  8:15 AM   Result Value Ref Range    T3,Free 2.5 2.0 - 4.4 pg/mL   GRP B STREP, BY PCR (PLUMMER BROTH)    Collection Time: 11/30/22  3:23 PM   Result Value Ref Range    Strep Gp B DNA PCR POSITIVE (A) Negative   COD - Adult (Type and Screen)    Collection Time: 12/04/22  8:05 AM   Result Value Ref Range    ABO Grouping Only O     Rh Grouping Only POS     Antibody Screen-Cod NEG    CBC WITH DIFFERENTIAL    Collection Time: 12/04/22  8:05 AM   Result Value Ref Range    WBC 8.9 4.8 - 10.8 K/uL    RBC 4.00 (L) 4.20 - 5.40 M/uL    Hemoglobin 12.7 12.0 - 16.0 g/dL    Hematocrit 37.3 37.0 - 47.0 %    MCV 93.3 81.4 - 97.8 fL    MCH 31.8 27.0 - 33.0 pg    MCHC 34.0 33.6 - 35.0 g/dL    RDW 44.1 35.9 - 50.0 fL    Platelet Count 225 164 - 446 K/uL    MPV 9.1 9.0 - 12.9 fL    Neutrophils-Polys 72.00 44.00 - 72.00 %    Lymphocytes 18.10 (L) 22.00 - 41.00 %    Monocytes 7.30 0.00 - 13.40 %    Eosinophils 0.70 0.00 - 6.90 %    Basophils 0.40 0.00 - 1.80 %    Immature Granulocytes 1.50 (H) 0.00 - 0.90 %    Nucleated RBC 0.00 /100 WBC    Neutrophils (Absolute) 6.41 2.00 - 7.15 K/uL    Lymphs (Absolute) 1.61 1.00 - 4.80 K/uL    Monos (Absolute) 0.65 0.00 - 0.85 K/uL    Eos (Absolute) 0.06 0.00 - 0.51 K/uL    Baso (Absolute) 0.04 0.00 - 0.12 K/uL    Immature Granulocytes (abs) 0.13 (H) 0.00 - 0.11 K/uL    NRBC (Absolute) 0.00 K/uL   CBC with Differential    Collection Time: 12/04/22  8:40 AM   Result Value Ref Range    WBC 8.6 4.8 - 10.8 K/uL    RBC 3.68 (L) 4.20 - 5.40 M/uL    Hemoglobin 11.7 (L) 12.0 - 16.0 g/dL    Hematocrit 34.9 (L) 37.0 - 47.0 %    MCV 94.8 81.4 - 97.8 fL    MCH 31.8 27.0 - 33.0 pg    MCHC 33.5 (L) 33.6 - 35.0 g/dL    RDW 44.6 35.9 - 50.0 fL    Platelet Count 201 164 - 446 K/uL    MPV 8.9 (L) 9.0 - 12.9 fL    Neutrophils-Polys 73.50 (H) 44.00 - 72.00 %    Lymphocytes 17.30 (L) 22.00 - 41.00 %    Monocytes 7.10 0.00 - 13.40 %    Eosinophils 0.50 0.00 - 6.90 %     Basophils 0.50 0.00 - 1.80 %    Immature Granulocytes 1.10 (H) 0.00 - 0.90 %    Nucleated RBC 0.00 /100 WBC    Neutrophils (Absolute) 6.30 2.00 - 7.15 K/uL    Lymphs (Absolute) 1.48 1.00 - 4.80 K/uL    Monos (Absolute) 0.61 0.00 - 0.85 K/uL    Eos (Absolute) 0.04 0.00 - 0.51 K/uL    Baso (Absolute) 0.04 0.00 - 0.12 K/uL    Immature Granulocytes (abs) 0.09 0.00 - 0.11 K/uL    NRBC (Absolute) 0.00 K/uL   T.PALLIDUM AB BRITTNY (Syphilis)    Collection Time: 22  8:40 AM   Result Value Ref Range    Syphilis, Treponemal Qual Non-Reactive Non-Reactive        Assessment:   Osito Rivera at 36w4d  Labor status: Not in labor.  Placenta previa    Obstetrical history significant for   Patient Active Problem List    Diagnosis Date Noted    Labor and delivery indication for care or intervention 2022    Placenta previa in third trimester 2022    Vasa previa 10/05/2022    Pregnancy, supervision, high-risk, third trimester 10/05/2022    36 weeks gestation of pregnancy 2022    Lip swelling 2020    Skin irritation 2020    Hypothyroidism, acquired, autoimmune 2019    Annual physical exam 2018    Hashimoto's thyroiditis 09/15/2017    Vitamin D deficiency 09/15/2017   .   EFW:   Plan:     Admit to L&D  GBS positive  Fetal monitoring/toco     Discussed with the patient the risks of  delivery. The risks include DVT/pulmonary embolism, pelvic scarring, pelvic pain, infection, bleeding, scarring, damage to other organs in the area of operation, anesthesia complications, and death. Specifically organs that can be damaged are bowel, bladder, ureters. I also discussed with the patient the risk of wound infection and wound breakdown. We discussed that these risks are greater in people that have diabetes or obesity. I also discussed the risk of emergency blood transfusion during procedure as well as emergency hysterectomy during procedure. Patient had the opportunity to ask questions  regarding procedures. All questions answered to the patient's satisfaction. Pt agrees to proceed with surgery.

## 2022-12-04 NOTE — ANESTHESIA TIME REPORT
Anesthesia Start and Stop Event Times     Date Time Event    12/4/2022 0945 Ready for Procedure     1222 Anesthesia Start     1321 Anesthesia Stop        Responsible Staff  12/04/22    Name Role Begin End    Niya Beauchamp M.D. Anesth 1222 1321        Overtime Reason:  no overtime (within assigned shift)    Comments:

## 2022-12-04 NOTE — ANESTHESIA PREPROCEDURE EVALUATION
Case: [id number] Date/Time: 22    Procedure:  SECTION, PRIMARY    Pre-op diagnosis:       PLACENTA PREVIA WITH VASA PREVIA      36WEEKS, PREVIA    Location: [facility] OR 01 / [facility] DEL[facility]    Surgeons: Kota Randolph M.D.          Relevant Problems   ENDO   (positive) Hypothyroidism, acquired, autoimmune       Physical Exam    Airway   Mallampati: I  TM distance: >3 FB  Neck ROM: full       Cardiovascular - normal exam     Dental - normal exam           Pulmonary   Breath sounds clear to auscultation     Abdominal   (+) obese     Neurological - normal exam                 Anesthesia Plan    ASA 2       Plan - spinal                     Pertinent diagnostic labs and testing reviewed    Informed Consent:    Anesthetic plan and risks discussed with patient.

## 2022-12-05 LAB
ERYTHROCYTE [DISTWIDTH] IN BLOOD BY AUTOMATED COUNT: 46.1 FL (ref 35.9–50)
HCT VFR BLD AUTO: 33.1 % (ref 37–47)
HGB BLD-MCNC: 10.9 G/DL (ref 12–16)
MCH RBC QN AUTO: 31.7 PG (ref 27–33)
MCHC RBC AUTO-ENTMCNC: 32.9 G/DL (ref 33.6–35)
MCV RBC AUTO: 96.2 FL (ref 81.4–97.8)
PLATELET # BLD AUTO: 179 K/UL (ref 164–446)
PMV BLD AUTO: 8.9 FL (ref 9–12.9)
RBC # BLD AUTO: 3.44 M/UL (ref 4.2–5.4)
WBC # BLD AUTO: 9.6 K/UL (ref 4.8–10.8)

## 2022-12-05 PROCEDURE — 36415 COLL VENOUS BLD VENIPUNCTURE: CPT

## 2022-12-05 PROCEDURE — 700111 HCHG RX REV CODE 636 W/ 250 OVERRIDE (IP): Performed by: ANESTHESIOLOGY

## 2022-12-05 PROCEDURE — 302151 K-PAD 14X20: Performed by: OBSTETRICS & GYNECOLOGY

## 2022-12-05 PROCEDURE — 700102 HCHG RX REV CODE 250 W/ 637 OVERRIDE(OP): Performed by: ANESTHESIOLOGY

## 2022-12-05 PROCEDURE — 770002 HCHG ROOM/CARE - OB PRIVATE (112)

## 2022-12-05 PROCEDURE — A9270 NON-COVERED ITEM OR SERVICE: HCPCS | Performed by: ANESTHESIOLOGY

## 2022-12-05 PROCEDURE — A9270 NON-COVERED ITEM OR SERVICE: HCPCS | Performed by: OBSTETRICS & GYNECOLOGY

## 2022-12-05 PROCEDURE — 700102 HCHG RX REV CODE 250 W/ 637 OVERRIDE(OP): Performed by: OBSTETRICS & GYNECOLOGY

## 2022-12-05 PROCEDURE — 85027 COMPLETE CBC AUTOMATED: CPT

## 2022-12-05 RX ORDER — LIOTHYRONINE SODIUM 5 UG/1
5 TABLET ORAL
Status: DISCONTINUED | OUTPATIENT
Start: 2022-12-05 | End: 2022-12-06 | Stop reason: HOSPADM

## 2022-12-05 RX ORDER — LEVOTHYROXINE SODIUM 112 UG/1
112 TABLET ORAL
Status: DISCONTINUED | OUTPATIENT
Start: 2022-12-05 | End: 2022-12-06 | Stop reason: HOSPADM

## 2022-12-05 RX ADMIN — ACETAMINOPHEN 1000 MG: 500 TABLET ORAL at 17:41

## 2022-12-05 RX ADMIN — IBUPROFEN 800 MG: 800 TABLET, FILM COATED ORAL at 21:13

## 2022-12-05 RX ADMIN — LIOTHYRONINE SODIUM 5 MCG: 5 TABLET ORAL at 06:06

## 2022-12-05 RX ADMIN — LEVOTHYROXINE SODIUM 112 MCG: 0.11 TABLET ORAL at 06:06

## 2022-12-05 RX ADMIN — OXYCODONE 5 MG: 5 TABLET ORAL at 19:39

## 2022-12-05 RX ADMIN — KETOROLAC TROMETHAMINE 30 MG: 30 INJECTION, SOLUTION INTRAMUSCULAR; INTRAVENOUS at 06:06

## 2022-12-05 RX ADMIN — ACETAMINOPHEN 1000 MG: 500 TABLET ORAL at 10:39

## 2022-12-05 RX ADMIN — ACETAMINOPHEN 1000 MG: 500 TABLET ORAL at 03:05

## 2022-12-05 ASSESSMENT — PAIN DESCRIPTION - PAIN TYPE
TYPE: SURGICAL PAIN
TYPE: ACUTE PAIN;SURGICAL PAIN
TYPE: SURGICAL PAIN
TYPE: SURGICAL PAIN
TYPE: ACUTE PAIN;SURGICAL PAIN

## 2022-12-05 ASSESSMENT — PATIENT HEALTH QUESTIONNAIRE - PHQ9
SUM OF ALL RESPONSES TO PHQ9 QUESTIONS 1 AND 2: 0
1. LITTLE INTEREST OR PLEASURE IN DOING THINGS: NOT AT ALL

## 2022-12-05 NOTE — OP REPORT
DATE OF SERVICE: 2022     PREOPERATIVE DIAGNOSES:  1.  Intrauterine pregnancy at 36w4d  2.  Placenta previa     POSTOPERATIVE DIAGNOSES:  1.  Intrauterine pregnancy at 36w4d  2.  same    PROCEDURE PERFORMED: Primary low transverse  section.     SURGEON: Kota Randolph MD     ASSISTANT: Italo Maya MD     ANESTHESIA:  Spinal.     ANESTHESIOLOGIST: Niya Beauchamp MD     SPECIMEN: None     ESTIMATED BLOOD LOSS: 600 mL     FINDINGS:  A viable female infant, weight 2800 g, Apgars of 8 and 9 in vertex    presentation with clear amniotic fluid.  There was a normal uterus,   tubes, and ovaries bilaterally.     COMPLICATIONS:  None.     PROCEDURE:  After appropriate consents were obtained, the patient was taken to the operating room where spinal  anesthesia was applied without complications.  The patient was then prepped and draped in the usual sterile manner.  Clamp test on the skin verified adequate anesthesia.  A Pfannenstiel incision was made with a scalpel 3cm superior to the pubic symphysis and extended down to the rectus fascia.  The rectus fascia was incised with the scalpel and tented up. The underlying rectus muscle was  from the fascia first inferiorly and then superiorly using the zabala scissors.  The rectus muscle was  bluntly in the midline.  The peritoneum was entered bluntly in the midline. The peritoneum incision was extended superiorly and inferiorly with the Metzenbaum scissors with great care to avoid injury to underlying bowel or bladder.  Alfredo retractor was placed. The vesicouterine peritoneum was tented up and entered with Metzenbaum scissors, and a bladder flap was created.  An incision was made into the lower uterine segment transversely and the incision was extended bluntly.  Amniotomy was performed and there was noted to be clear amniotic fluid. The Infant's head was grasped and delivered atraumatically followed by the remainder of the body without any  complications.  The mouth and nares were suctioned. The cord was doubly clamped and cut and the infant was handed off to awaiting neonatology team.  The placenta was then allowed to spontaneously deliver. The uterus was cleared of clots and debris.  The hysterotomy incision was reapproximated with 1-0 Vicryl suture in a running locked fashion.   Hemostasis was noted.  The tubes and ovaries were examined and noted to be normal.  The pelvis was irrigated with normal saline. The pericolic gutters were examined and any blood clots were removed.  The Alfredo retractor was removed. The peritoneum was reapproximated with 3-0 Vicryl suture running.  The rectus muscles were examined and hemostatic.  The fascia was reapproximated with 0 Vicryl suture running.  The subcutaneous fat was irrigated and any small bleeders were bovied for hemostasis.  The subcutaneous fat was then reapproximated with 3-0 Vicryl suture running.  The skin was reapproximated with 4-0 Monocryl suture running and Dermabond. A dressing was then placed.  Sponge, needle, instrument, and lap counts were correct x2.  Patient tolerated the procedure well and went to recovery room in stable condition.        ____________________________________  Kota Randolph MD

## 2022-12-05 NOTE — PROGRESS NOTES
1515 - Received report from L&D RN. Orientated patient to room, call light and emergency light education provided. Assessment completed, fundus firm, lochia light. Abdominal incision with dressing intact. Plan of care reviewed, patient verbalized understanding. Denies pain at this time, will call if pain med intervention is needed.

## 2022-12-05 NOTE — PROGRESS NOTES
Post-Partum Progress Note    Osito Rivera is a 32 y.o.  on post-op day #1 following primary C/S for placenta previa:    Subjective:   Pt reports overall feeling well.   Reports giancarlo horse like pain right trap following most recent administration of toradol and generalized pruritus.   Continue to monitor symptoms, minimize opioid use. Consider CMP if symptoms persist or progress.  Pt is ambulating and has voided spontaneously. She is not passing flatus.  She is tolerating PO.  Reports her bleeding is less than a period.    Breast feeding.: yes, reports this is going well.      Patient Vitals for the past 24 hrs:   BP Temp Temp src Pulse Resp SpO2   22 0606 (!) 90/56 36.4 °C (97.6 °F) Temporal 78 17 96 %   22 0500 -- -- -- 92 18 95 %   22 0400 -- -- -- 74 18 96 %   22 0300 -- -- -- 86 18 95 %   22 0200 104/69 36.1 °C (97 °F) Temporal 88 18 99 %   22 0100 -- -- -- 71 18 92 %   22 0000 -- -- -- 86 18 95 %   22 2300 -- -- -- 82 18 96 %   22 2200 (!) 95/57 36.3 °C (97.4 °F) Temporal 88 18 93 %   22 2100 -- -- -- 91 18 96 %   22 2000 -- -- -- 88 18 94 %   22 1900 -- -- -- 92 18 95 %   22 1654 (!) 95/57 37.1 °C (98.7 °F) Tympanic 88 18 98 %   22 1615 -- -- -- 85 16 98 %   22 1515 -- -- -- 76 16 96 %   22 1425 (!) 97/54 -- -- 75 18 96 %   22 1410 (!) 92/54 -- -- 89 16 95 %   22 1354 (!) 97/51 -- -- 99 -- 93 %   22 1340 (!) 95/53 -- -- 96 18 96 %   22 1325 109/55 36.1 °C (96.9 °F) Temporal 98 18 93 %   22 0800 113/69 37.1 °C (98.7 °F) Temporal (!) 107 -- 94 %         Intake/Output Summary (Last 24 hours) at 2022 0727  Last data filed at 2022 0006  Gross per 24 hour   Intake 1000 ml   Output 1325 ml   Net -325 ml     Gen: Alert, conversant, NAD  CV: RRR, nl S1 and S2 without murmur, cap refill <2 sec  Resp: Unlabored, symmetric chest rise, clear to auscultation bilaterally  Abd:  soft, ND, appropriately tender to palpation, no rebound/guarding, +BS; fundus palpable below umbilcus   Incision: dressing without strike-through  Ext: NT, no edema bilaterally    Meds:   Current Facility-Administered Medications:     liothyronine (CYTOMEL) tablet 5 mcg, 5 mcg, Oral, Daily-0600, Kota Randolph M.D., 5 mcg at 12/05/22 0606    levothyroxine (SYNTHROID) tablet 112 mcg, 112 mcg, Oral, AM ES, Kota Randolph M.D., 112 mcg at 12/05/22 0606    lactated ringers (LR) infusion, , Intravenous, Continuous, Kota Randolph M.D., Stopped at 12/04/22 2200    LR infusion, , Intravenous, Once, Kota Randolph M.D.    [COMPLETED] oxytocin (PITOCIN) infusion (for post delivery), 500 mL, Intravenous, Once, Last Rate: 999 mL/hr at 12/04/22 1245, 10 Units at 12/04/22 1245 **FOLLOWED BY** oxytocin (Pitocin) infusion (for postpartum), 125 mL/hr, Intravenous, Continuous, Kota Randolph M.D., Last Rate: 125 mL/hr at 12/04/22 1250, 125 mL/hr at 12/04/22 1250    oxytocin (PITOCIN) injection 10 Units, 10 Units, Intramuscular, Once PRN, Kota Randolph M.D.    ondansetron (ZOFRAN) syringe/vial injection 4 mg, 4 mg, Intravenous, Once PRN, Niya Beauchamp M.D.    haloperidol lactate (HALDOL) injection 1 mg, 1 mg, Intravenous, Q15 MIN PRN, Niya Beauchamp M.D.    diphenhydrAMINE (BENADRYL) injection 12.5 mg, 12.5 mg, Intravenous, Q15 MIN PRN, Niya Beauchamp M.D.    acetaminophen (TYLENOL) tablet 1,000 mg, 1,000 mg, Oral, Q6HR, Niya Beauchamp M.D., 1,000 mg at 12/05/22 0305    ketorolac (TORADOL) injection 30 mg, 30 mg, Intravenous, Q6HR, Niya Beauchamp M.D., 30 mg at 12/05/22 0606    oxyCODONE immediate-release (ROXICODONE) tablet 5 mg, 5 mg, Oral, Q4HRS PRN, Niya Beauchamp M.D.    oxyCODONE immediate release (ROXICODONE) tablet 10 mg, 10 mg, Oral, Q4HRS PRN, Niya Beauchamp M.D.    HYDROmorphone (Dilaudid) injection 0.2 mg, 0.2 mg, Intravenous, Q2HRS PRN, Niya Beauchamp M.D.    HYDROmorpalondra  (Dilaudid) injection 0.4 mg, 0.4 mg, Intravenous, Q2HRS PRN, Niya Beuachamp M.D.    ePHEDrine injection 10 mg, 10 mg, Intravenous, Q5 MIN PRN, Niya Beauchamp M.D.    ondansetron (ZOFRAN) syringe/vial injection 4 mg, 4 mg, Intravenous, Q6HRS PRN, Niya Beauchamp M.D.    diphenhydrAMINE (BENADRYL) injection 12.5 mg, 12.5 mg, Intravenous, Q6HRS PRN, Niya Beauchamp M.D.    diphenhydrAMINE (BENADRYL) injection 12.5 mg, 12.5 mg, Intravenous, Q6HRS PRN **OR** diphenhydrAMINE (BENADRYL) injection 25 mg, 25 mg, Intravenous, Q6HRS PRN **OR** naloxone HCl (NARCAN) 20 mg in  mL infusion, 0.4 mg/hr, Intravenous, Q6HRS PRN, Niya Beauchamp M.D.    fentaNYL (SUBLIMAZE) injection 25 mcg, 25 mcg, Intravenous, Q2 MIN PRN **OR** fentaNYL (SUBLIMAZE) injection 50 mcg, 50 mcg, Intravenous, Q2 MIN PRN, Niya Beauchamp M.D.    HYDROmorphone (Dilaudid) injection 0.1 mg, 0.1 mg, Intravenous, Q5 MIN PRN **OR** HYDROmorphone (Dilaudid) injection 0.2 mg, 0.2 mg, Intravenous, Q5 MIN PRN **OR** HYDROmorphone (Dilaudid) injection 0.4 mg, 0.4 mg, Intravenous, Q5 MIN PRN, Niya Beauchamp M.D.    oxyCODONE (ROXICODONE) oral solution 5 mg, 5 mg, Oral, Once PRN **OR** oxyCODONE (ROXICODONE) oral solution 10 mg, 10 mg, Oral, Once PRN, Niya Beauchamp M.D.    meperidine (DEMEROL) injection 6.25 mg, 6.25 mg, Intravenous, Q5 MIN PRN, Niya Beauchamp M.D.    midazolam (Versed) injection 1 mg, 1 mg, Intravenous, Q5 MIN PRN, Niya Beauchamp M.D.    ePHEDrine injection 5 mg, 5 mg, Intravenous, Q5 MIN PRN, Niya Beauchamp M.D.    labetalol (NORMODYNE/TRANDATE) injection 5 mg, 5 mg, Intravenous, Q5 MIN PRN, Niya Beauchamp M.D.    hydrALAZINE (APRESOLINE) injection 5 mg, 5 mg, Intravenous, Q5 MIN PRN, Niya Beauchamp M.D.    albuterol (PROVENTIL) 2.5mg/0.5ml nebulizer solution 2.5 mg, 2.5 mg, Inhalation, Q10 MIN PRN, Niya Beauchamp M.D.    lactated ringers infusion, , Intravenous, PRN, Kota Randolph M.D.    ibuprofen (MOTRIN)  tablet 800 mg, 800 mg, Oral, Q8HRS **FOLLOWED BY** [START ON 12/8/2022] ibuprofen (MOTRIN) tablet 800 mg, 800 mg, Oral, Q8HRS PRN, Kota Randolph M.D.    acetaminophen (TYLENOL) tablet 1,000 mg, 1,000 mg, Oral, Q6HRS **FOLLOWED BY** [START ON 12/8/2022] acetaminophen (TYLENOL) tablet 1,000 mg, 1,000 mg, Oral, Q6HRS PRN, Kota Randolph M.D.    oxyCODONE immediate-release (ROXICODONE) tablet 5 mg, 5 mg, Oral, Q4HRS PRN, Kota Randolph M.D.    oxyCODONE immediate release (ROXICODONE) tablet 10 mg, 10 mg, Oral, Q4HRS PRN, Kota Randolph M.D.    ondansetron (ZOFRAN) syringe/vial injection 4 mg, 4 mg, Intravenous, Q6HRS PRN **OR** ondansetron (ZOFRAN ODT) dispertab 4 mg, 4 mg, Oral, Q6HRS PRN, Kota Randolph M.D.    diphenhydrAMINE (BENADRYL) tablet/capsule 25 mg, 25 mg, Oral, Q6HRS PRN **OR** diphenhydrAMINE (BENADRYL) injection 25 mg, 25 mg, Intravenous, Q6HRS PRN, Kota Randolph M.D.    docusate sodium (COLACE) capsule 100 mg, 100 mg, Oral, BID PRN, Kota Randolph M.D.    tetanus-dipth-acell pertussis (Tdap) inj 0.5 mL, 0.5 mL, Intramuscular, Once PRN, Kota Randolph M.D.      Lab:   Recent Results (from the past 48 hour(s))   COD - Adult (Type and Screen)    Collection Time: 12/04/22  8:05 AM   Result Value Ref Range    ABO Grouping Only O     Rh Grouping Only POS     Antibody Screen-Cod NEG    CBC WITH DIFFERENTIAL    Collection Time: 12/04/22  8:05 AM   Result Value Ref Range    WBC 8.9 4.8 - 10.8 K/uL    RBC 4.00 (L) 4.20 - 5.40 M/uL    Hemoglobin 12.7 12.0 - 16.0 g/dL    Hematocrit 37.3 37.0 - 47.0 %    MCV 93.3 81.4 - 97.8 fL    MCH 31.8 27.0 - 33.0 pg    MCHC 34.0 33.6 - 35.0 g/dL    RDW 44.1 35.9 - 50.0 fL    Platelet Count 225 164 - 446 K/uL    MPV 9.1 9.0 - 12.9 fL    Neutrophils-Polys 72.00 44.00 - 72.00 %    Lymphocytes 18.10 (L) 22.00 - 41.00 %    Monocytes 7.30 0.00 - 13.40 %    Eosinophils 0.70 0.00 - 6.90 %    Basophils 0.40 0.00 - 1.80 %    Immature Granulocytes  1.50 (H) 0.00 - 0.90 %    Nucleated RBC 0.00 /100 WBC    Neutrophils (Absolute) 6.41 2.00 - 7.15 K/uL    Lymphs (Absolute) 1.61 1.00 - 4.80 K/uL    Monos (Absolute) 0.65 0.00 - 0.85 K/uL    Eos (Absolute) 0.06 0.00 - 0.51 K/uL    Baso (Absolute) 0.04 0.00 - 0.12 K/uL    Immature Granulocytes (abs) 0.13 (H) 0.00 - 0.11 K/uL    NRBC (Absolute) 0.00 K/uL   CBC with Differential    Collection Time: 22  8:40 AM   Result Value Ref Range    WBC 8.6 4.8 - 10.8 K/uL    RBC 3.68 (L) 4.20 - 5.40 M/uL    Hemoglobin 11.7 (L) 12.0 - 16.0 g/dL    Hematocrit 34.9 (L) 37.0 - 47.0 %    MCV 94.8 81.4 - 97.8 fL    MCH 31.8 27.0 - 33.0 pg    MCHC 33.5 (L) 33.6 - 35.0 g/dL    RDW 44.6 35.9 - 50.0 fL    Platelet Count 201 164 - 446 K/uL    MPV 8.9 (L) 9.0 - 12.9 fL    Neutrophils-Polys 73.50 (H) 44.00 - 72.00 %    Lymphocytes 17.30 (L) 22.00 - 41.00 %    Monocytes 7.10 0.00 - 13.40 %    Eosinophils 0.50 0.00 - 6.90 %    Basophils 0.50 0.00 - 1.80 %    Immature Granulocytes 1.10 (H) 0.00 - 0.90 %    Nucleated RBC 0.00 /100 WBC    Neutrophils (Absolute) 6.30 2.00 - 7.15 K/uL    Lymphs (Absolute) 1.48 1.00 - 4.80 K/uL    Monos (Absolute) 0.61 0.00 - 0.85 K/uL    Eos (Absolute) 0.04 0.00 - 0.51 K/uL    Baso (Absolute) 0.04 0.00 - 0.12 K/uL    Immature Granulocytes (abs) 0.09 0.00 - 0.11 K/uL    NRBC (Absolute) 0.00 K/uL   T.PALLIDUM AB BRITTNY (Syphilis)    Collection Time: 22  8:40 AM   Result Value Ref Range    Syphilis, Treponemal Qual Non-Reactive Non-Reactive       A/P: 32 y.o.  POD 1 s/p primary  delivery @ 36w4d for placenta previa.  - Doing well postpartum, meeting all postop milestones; encouraged ambulation, cont routine postop care  - infant: doing well at bedside, pt is breastfeeding  - Rh+/RI  - ppx: SCDs, encourage ambulation  - F/u PP CBC    Disposition: anticipate discharge on POD 2 or 3    Belen Woodson M.D.

## 2022-12-05 NOTE — LACTATION NOTE
This note was copied from a baby's chart.  Ana reports that baby Daija has been latching and breast feeding well until this afternoon. She is currently passing large meconium stools and voiding. We placed her skin to skin and she will give her some time.     LPI protocols discussed, mother well aware of implications of LPI baby if not feeding well and has no problem offering formula if needed.    She may opt to hand express and spoon feed and then if baby still sleepy, pump and offer a supplement.

## 2022-12-05 NOTE — PROGRESS NOTES
Assumed care at 0700. Csection dressing c/d/I. Pt states pain is managed with current pain regimen. Pt breastfeeding. Pt ambulating and passing flatus. Fundus firm at U and lochia scant rubra.

## 2022-12-05 NOTE — CARE PLAN
Problem: Altered Physiologic Condition  Goal: Patient physiologically stable as evidenced by normal lochia, palpable uterine involution and vitals within normal limits  Outcome: Progressing     Problem: Infection - Postpartum  Goal: Postpartum patient will be free of signs and symptoms of infection  Outcome: Progressing   The patient is Stable - Low risk of patient condition declining or worsening    Shift Goals  Clinical Goals: VSS, pain control  Patient Goals: Bonding with  and rest  Family Goals: TAHMINA    Progress made toward(s) clinical / shift goals:  Pt lochia scant rubra, fundus firm. Pt afebrile. Dressing to abd c/d/I.     Patient is not progressing towards the following goals:

## 2022-12-05 NOTE — CARE PLAN
The patient is Stable - Low risk of patient condition declining or worsening    Shift Goals  Clinical Goals: vss, lochia WNL      Problem: Psychosocial - Postpartum  Goal: Patient will verbalize and demonstrate effective bonding and parenting behavior  Outcome: Progressing  Note: MOB has shown adequate bonding with infant, provided skin to skin, and proper cares of infant.       Problem: Knowledge Deficit - Postpartum  Goal: Patient will verbalize and demonstrate understanding of self and infant care  Outcome: Progressing  Note: MOB demonstrates and verbalizes understanding on how to care for . Asks appropriate questions and calls for help when necessary. Education has been provided to patient.

## 2022-12-05 NOTE — OR SURGEON
Immediate Post OP Note    PreOp Diagnosis: Intrauterine pregnancy at 36 weeks 4-day gestational age, placenta previa      PostOp Diagnosis: Same      Procedure(s):   SECTION, PRIMARY - Wound Class: Clean Contaminated    Surgeon(s):  Kota Randolph M.D.    Anesthesiologist/Type of Anesthesia:  Anesthesiologist: Niya Beauchamp M.D./Spinal    Surgical Staff:  Circulator: Jennifer Neal R.N.  Scrub Person: Christina Sharpe; Emily Quintero  L&D Baby  Nurse: Laura Sun R.N.    Specimens removed if any:  * No specimens in log *    Estimated Blood Loss: 600 mL    Findings: Female infant, Apgars of 8 and 9, weight 2800 g.  None placenta with three-vessel cord.  Bilateral normal tubes and ovaries    Complications: None        2022 10:45 PM Kota Randolph M.D.

## 2022-12-05 NOTE — PROGRESS NOTES
Pt is A&Ox4. No family is at bedside. VSS. Denies pain.  Up self.  Fundus is firm, lochia is light with no clots.  Dressing to abdomen is CDI.  MOB participating in infant care.  Call light within reach and working properly.

## 2022-12-05 NOTE — CARE PLAN
The patient is Stable - Low risk of patient condition declining or worsening    Shift Goals  Clinical Goals: VSS, lochia WNL  Patient Goals: Bonding with  and rest  Family Goals: TAHMINA    Progress made toward(s) clinical / shift goals:    Problem: Knowledge Deficit - Standard  Goal: Patient and family/care givers will demonstrate understanding of plan of care, disease process/condition, diagnostic tests and medications  Note: Pt updated on POC,pt verbalized understanding. Questions answered.       Problem: Pain - Standard  Goal: Alleviation of pain or a reduction in pain to the patient’s comfort goal  Outcome: Progressing  Note: Pain assessed Q2h. Pain medication given per orders, see MAR.

## 2022-12-06 ENCOUNTER — PHARMACY VISIT (OUTPATIENT)
Dept: PHARMACY | Facility: MEDICAL CENTER | Age: 32
End: 2022-12-06
Payer: COMMERCIAL

## 2022-12-06 VITALS
TEMPERATURE: 98.4 F | HEART RATE: 75 BPM | SYSTOLIC BLOOD PRESSURE: 116 MMHG | BODY MASS INDEX: 30.53 KG/M2 | HEIGHT: 66 IN | OXYGEN SATURATION: 99 % | RESPIRATION RATE: 17 BRPM | WEIGHT: 190 LBS | DIASTOLIC BLOOD PRESSURE: 77 MMHG

## 2022-12-06 PROCEDURE — RXMED WILLOW AMBULATORY MEDICATION CHARGE

## 2022-12-06 PROCEDURE — 700102 HCHG RX REV CODE 250 W/ 637 OVERRIDE(OP): Performed by: OBSTETRICS & GYNECOLOGY

## 2022-12-06 PROCEDURE — A9270 NON-COVERED ITEM OR SERVICE: HCPCS | Performed by: OBSTETRICS & GYNECOLOGY

## 2022-12-06 RX ORDER — ACETAMINOPHEN 500 MG
1000 TABLET ORAL EVERY 6 HOURS
Qty: 30 TABLET | Refills: 0 | Status: SHIPPED | OUTPATIENT
Start: 2022-12-06 | End: 2023-03-15

## 2022-12-06 RX ORDER — OXYCODONE HYDROCHLORIDE 5 MG/1
5 TABLET ORAL EVERY 6 HOURS PRN
Qty: 20 TABLET | Refills: 0 | Status: SHIPPED | OUTPATIENT
Start: 2022-12-06 | End: 2022-12-11

## 2022-12-06 RX ORDER — SENNA AND DOCUSATE SODIUM 50; 8.6 MG/1; MG/1
1 TABLET, FILM COATED ORAL 2 TIMES DAILY PRN
Qty: 30 TABLET | Refills: 0 | Status: SHIPPED | OUTPATIENT
Start: 2022-12-06 | End: 2023-03-15

## 2022-12-06 RX ORDER — IBUPROFEN 800 MG/1
800 TABLET ORAL EVERY 8 HOURS
Qty: 30 TABLET | Refills: 0 | Status: SHIPPED | OUTPATIENT
Start: 2022-12-06 | End: 2023-03-15

## 2022-12-06 RX ADMIN — IBUPROFEN 800 MG: 800 TABLET, FILM COATED ORAL at 05:47

## 2022-12-06 RX ADMIN — LEVOTHYROXINE SODIUM 112 MCG: 0.11 TABLET ORAL at 05:47

## 2022-12-06 RX ADMIN — ACETAMINOPHEN 1000 MG: 500 TABLET ORAL at 00:17

## 2022-12-06 RX ADMIN — LIOTHYRONINE SODIUM 5 MCG: 5 TABLET ORAL at 05:47

## 2022-12-06 RX ADMIN — ACETAMINOPHEN 1000 MG: 500 TABLET ORAL at 05:47

## 2022-12-06 RX ADMIN — IBUPROFEN 800 MG: 800 TABLET, FILM COATED ORAL at 15:27

## 2022-12-06 RX ADMIN — OXYCODONE 5 MG: 5 TABLET ORAL at 00:16

## 2022-12-06 RX ADMIN — OXYCODONE 5 MG: 5 TABLET ORAL at 15:41

## 2022-12-06 RX ADMIN — OXYCODONE 5 MG: 5 TABLET ORAL at 10:07

## 2022-12-06 RX ADMIN — OXYCODONE 5 MG: 5 TABLET ORAL at 05:51

## 2022-12-06 RX ADMIN — ACETAMINOPHEN 1000 MG: 500 TABLET ORAL at 12:57

## 2022-12-06 ASSESSMENT — PAIN DESCRIPTION - PAIN TYPE
TYPE: SURGICAL PAIN
TYPE: ACUTE PAIN;SURGICAL PAIN
TYPE: SURGICAL PAIN
TYPE: ACUTE PAIN;SURGICAL PAIN
TYPE: ACUTE PAIN;SURGICAL PAIN
TYPE: SURGICAL PAIN
TYPE: SURGICAL PAIN
TYPE: ACUTE PAIN;SURGICAL PAIN
TYPE: ACUTE PAIN;SURGICAL PAIN
TYPE: SURGICAL PAIN

## 2022-12-06 NOTE — CARE PLAN
The patient is Stable - Low risk of patient condition declining or worsening    Shift Goals  Clinical Goals: Maintain fundus firm, lochia light; pain management; discharge home  Patient Goals: Bonding with  and rest  Family Goals: TAHMINA    Progress made toward(s) clinical / shift goals:  MET

## 2022-12-06 NOTE — DISCHARGE INSTRUCTIONS
Pelvic rest x6 weeks  Return for follow up visit in 1 and 6 weeks.  Call or come to ED for: heavy vaginal bleeding, fever >100.4, severe abdominal pain, severe headache, chest pain, shortness of breath,  N/V, incisional drainage, or other concerns.     PATIENT DISCHARGE EDUCATION INSTRUCTION SHEET    REASONS TO CALL YOUR OBSTETRICIAN  Persistent fever, shaking, chills (Temperature higher than 100.4) may indicate you have an infection  Heavy bleeding: soaking more than 1 pad per hour; Passing clots an egg-sized clot or bigger may mean you have an postpartum hemorrhage  Foul odor from vagina or bad smelling or discolored discharge or blood  Breast infection (Mastitis symptoms); breast pain, chills, fever, redness or red streaks, may feel flu like symptoms  Urinary pain, burning or frequency  Incision that is not healing, increased redness, swelling, tenderness or pain, or any pus from episiotomy or  site may mean you have an infection  Redness, swelling, warmth, or painful to touch in the calf area of your leg may mean you have a blood clot  Severe or intensified depression, thoughts or feelings of wanting to hurt yourself or someone else   Pain in chest, obstructed breathing or shortness of breath (trouble catching your breath) may mean you are having a postpartum complication. Call your provider immediately   Headache that does not get better, even after taking medicine, a bad headache with vision changes or pain in the upper right area of your belly may mean you have high blood pressure or post birth preeclampsia. Call your provider immediately    HAND WASHING  All family and friends should wash their hands:  Before and after holding the baby  Before feeding the baby  After using the restroom or changing the baby's diaper    WOUND CARE  Ask your physician for additional care instructions. In general:   Incision:  May shower and pat incision dry   Keep the incision clean and dry  There should not  be any opening or pus from the incision  Continue to walk at home 3 times a day   Do NOT lift anything heavier than your baby (over 10 pounds)  Encourage family to help participate in care of the  to allow rest and mom time to heal    VAGINAL CARE AND BLEEDING  Nothing inside vagina for 6 weeks:   No sexual intercourse, tampons or douching  Bleeding may continue for 2-4 weeks. Amount and color may vary  Soaking 1 pad or more in an hour for several hours is considered heavy bleeding  Passing large egg sized blood clots can be concerning  If you feel like you have heavy bleeding or are having increasing amount of blood clots call your Obstetrician immediately  If you begin feeling faint upon standing, feeling sick to your stomach, have clammy skin, a really fast heartbeat, have chills, start feeling confused, dizzy, sleepy or weak, or feeling like you're going to faint call your Obstetrician immediately    HYPERTENSION   Preeclampsia or gestational hypertension are types of high blood pressure that only pregnant women can get. It is important for you to be aware of symptoms to seek early intervention and treatment. If you have any of these symptoms immediately call your Obstetrician    Vision changes or blurred vision   Severe headache or pain that is unrelieved with medication and will not go away  Persistent pain in upper abdomen or shoulder   Increased swelling of face, feet, or hands  Difficulty breathing or shortness of breath at rest  Urinating less than usual    URINATION AND BOWEL MOVEMENTS  Eating more fiber (bran cereal, fruits, and vegetables) and drinking plenty of fluids will help to avoid constipation  Urinary frequency and urgency after childbirth is normal  If you experience any urinary pain, burning or frequency call your provider    BIRTH CONTROL  It is possible to become pregnant at any time after delivery and while breastfeeding  Plan to discuss a method of birth control with your physician  "at your post delivery follow up visit    POSTPARTUM BLUES  During the first few days after birth, you may experience a sense of the \"blues\" which may include impatience, irritability or even crying. These feelings come and go quickly. However, as many as 1 in 10 women experience emotional symptoms known as postpartum depression.     POSTPARTUM DEPRESSION  May start as early as the second or third day after delivery or take several weeks or months to develop. Symptoms of \"blues\" are present, but are more intense: Crying spells; loss of appetite; feelings of hopelessness or loss of control; fear of touching the baby; over concern or no concern at all about the baby; little or no concern about your own appearance/caring for yourself; and/or inability to sleep or excessive sleeping. Contact your Obstetrician if you are experiencing any of these symptoms     PREVENTING SHAKEN BABY  If you are angry or stressed, PUT THE BABY IN THE CRIB, step away, take some deep breaths, and wait until you are calm to care for the baby. DO NOT SHAKE THE BABY. You are not alone, call a supporter for help.  [facility] 24/7 crisis call line (106-839-3581) or (1-328.885.2896)  You can also text them, text \"ANSWER\" ([phone])  "

## 2022-12-06 NOTE — PROGRESS NOTES
5407- Bedside report received from LEE Ortiz.  Patient denied needs.  3417- Patient assessment done.  Patient reported she is voiding without difficulty and passing flatus.  Patient denied dizziness and reported that she is walking without difficulty.  Discussed pain management plan, to include MD orders for scheduled and prn pain medications.  Reviewed plan of care.  Patient verbalized understanding.  1058- Patient stated desire for discharge home today and was encouraged to read the written patient discharge education/instruction sheet.    1558- Dr. Kern notified that patient has a follow up appointment scheduled on 12/19/22.  Per Dr. Kern, patient may be seen on 12/19/22 instead of one week follow up.  1630- Patient stated she read the written patient discharge education/instruction sheet and has no questions.  Discharge instructions reviewed with patient who verbalized understanding and stated she has no questions.  1658- Patient stated that she is ready for discharge.  Patient discharged to home, no change noted in condition, via wheelchair with infant and FOB.

## 2022-12-06 NOTE — LACTATION NOTE
Breastfeeding plan:    Feed baby with feeding cues and at least a minimum of 8x/24 hours.  Expect cluster feeding as this is normal during early days of life and growth spurts.  It is not recommended to let baby sleep longer than 4 hours between feedings and if sleepy, place skin to skin to promote feeding interest and milk production.  Baby's usually feed more frequently and longer when skin to skin with mother.     Mother may offer supplement of expressed milk or formula as desires or if baby sleepy at feedings.    Make sure infant is getting enough by ensuring frequent feedings as well as looking for transitioning stools from dark meconium to bright yellow/green seedy loose stools by day 5.     Follow up with PEDS PCP as scheduled for weight checks and to make sure feeding is progressing appropriately.    Call for breastfeeding for 1:1 lactation consultation through  [facility] (see information sheet) as needed and attend the [facility] without need for appointment.

## 2022-12-06 NOTE — DISCHARGE SUMMARY
Discharge Summary:     Date of Admission: 2022  Date of Discharge: 22      Admitting diagnosis:    1. Pregnancy @ 36w6d   2. Placenta previa      Discharge Diagnosis:   1. Status post  for placenta previa.      Past Medical History:   Diagnosis Date    Asthma     Hypotension     Pregnant 2022    Thyroid disease      OB History    Para Term  AB Living   2 1 1     1   SAB IAB Ectopic Molar Multiple Live Births           0 1      # Outcome Date GA Lbr Neal/2nd Weight Sex Delivery Anes PTL Lv   2 Current            1 Term 21 38w4d / 01:26 3.21 kg (7 lb 1.2 oz) F Vag-Spont None N HOA     Past Surgical History:   Procedure Laterality Date    PRIMARY C SECTION N/A 2022    Procedure:  SECTION, PRIMARY;  Surgeon: Kota Randolph M.D.;  Location: [facility];  Service: Labor and Delivery     Penicillins    Patient Active Problem List   Diagnosis    Hashimoto's thyroiditis    Vitamin D deficiency    Annual physical exam    Hypothyroidism, acquired, autoimmune    Lip swelling    Skin irritation    36 weeks gestation of pregnancy    Vasa previa    Pregnancy, supervision, high-risk, third trimester    Placenta previa in third trimester    Labor and delivery indication for care or intervention       Hospital Course:   Pt is a 32 y.o. now  who presented on 2022 for scheduled primary  for placenta previa.  Spinal anesthesia was utilized with good effect on pain. Single female infant was delivered via  at 1243. Apgars 8, 9 at 1 and 5 minutes respectively.  ml.     Postpartum course notable for early ambulation, well managed pain, tolerance of diet, spontaneous voiding, and appropriate feeding of infant. She has remained afebrile and blood pressure has been well controlled. All maternal questions and concerns addressed    Interval update:  This morning, patient reports she is feeling well.  She has been able to  ambulate with minimal pain.  She has also been able to void and is actively passing gas but has not had a bowel movement.  Patient plans to breast-feed and is intermittently supplementing with formula.  Patient states that she would like to go home today and has no questions or concerns at this moment.    Physical Exam:  Temp:  [36.6 °C (97.8 °F)-37.1 °C (98.8 °F)] 36.9 °C (98.4 °F)  Pulse:  [75-96] 75  Resp:  [17-18] 17  BP: (105-116)/(60-77) 116/77  SpO2:  [96 %-99 %] 99 %  Physical Exam  General: well appearing, no apparent distress  Abdomen: soft, nontender, nondistended  Fundus: firm at level below umbilicus  Incision: dressing clean, dry, intact, no significant drainage, and no significant erythema  Perineum: Deferred  Extremities: symmetric, no peripheral edema, calves nontender    Current Facility-Administered Medications   Medication Dose    liothyronine (CYTOMEL) tablet 5 mcg  5 mcg    levothyroxine (SYNTHROID) tablet 112 mcg  112 mcg    oxytocin (Pitocin) infusion (for postpartum)  125 mL/hr    oxytocin (PITOCIN) injection 10 Units  10 Units    lactated ringers infusion      ibuprofen (MOTRIN) tablet 800 mg  800 mg    Followed by    [START ON 12/8/2022] ibuprofen (MOTRIN) tablet 800 mg  800 mg    acetaminophen (TYLENOL) tablet 1,000 mg  1,000 mg    Followed by    [START ON 12/8/2022] acetaminophen (TYLENOL) tablet 1,000 mg  1,000 mg    oxyCODONE immediate-release (ROXICODONE) tablet 5 mg  5 mg    oxyCODONE immediate release (ROXICODONE) tablet 10 mg  10 mg    ondansetron (ZOFRAN) syringe/vial injection 4 mg  4 mg    Or    ondansetron (ZOFRAN ODT) dispertab 4 mg  4 mg    diphenhydrAMINE (BENADRYL) tablet/capsule 25 mg  25 mg    Or    diphenhydrAMINE (BENADRYL) injection 25 mg  25 mg    docusate sodium (COLACE) capsule 100 mg  100 mg    tetanus-dipth-acell pertussis (Tdap) inj 0.5 mL  0.5 mL       Recent Labs     12/04/22  0805 12/04/22  0840 12/05/22  0832   WBC 8.9 8.6 9.6   RBC 4.00* 3.68* 3.44*    HEMOGLOBIN 12.7 11.7* 10.9*   HEMATOCRIT 37.3 34.9* 33.1*   MCV 93.3 94.8 96.2   MCH 31.8 31.8 31.7   MCHC 34.0 33.5* 32.9*   RDW 44.1 44.6 46.1   PLATELETCT 225 201 179   MPV 9.1 8.9* 8.9*         Activity/ Discharge Instructions::   Discharge to home  Pelvic Rest x 6 weeks  No heavy lifting x4 weeks  Call or come to ED for: heavy vaginal bleeding, fever >100.4, severe abdominal pain, severe headache, chest pain, shortness of breath,  N/V, incisional drainage, or other concerns.       Follow up:  [facility] in 5 weeks for vaginal delivery; 1 week for incision check for  delivery.     Discharge Meds:   Current Outpatient Medications   Medication Sig Dispense Refill    acetaminophen (TYLENOL) 500 MG Tab Take 2 Tablets by mouth every 6 hours. Do not exceed 4000 mg in 24 hours. 30 Tablet 0    ibuprofen (MOTRIN) 800 MG Tab Take 1 Tablet by mouth every 8 hours. Indications: Joint Damage causing Pain and Loss of Function 30 Tablet 0    oxyCODONE immediate-release (ROXICODONE) 5 MG Tab Take 1 Tablet by mouth every 6 hours as needed for Severe Pain for up to 5 days. 20 Tablet 0    sennosides-docusate sodium (SENOKOT-S) 8.6-50 MG tablet Take 1 Tablet by mouth 2 times a day as needed (Constipation). 30 Tablet 0           Italo Kern M.D.

## 2022-12-06 NOTE — CARE PLAN
Problem: Pain - Standard  Goal: Alleviation of pain or a reduction in pain to the patient’s comfort goal  Outcome: Progressing     Problem: Infection - Postpartum  Goal: Postpartum patient will be free of signs and symptoms of infection  Outcome: Progressing     Problem: Altered Physiologic Condition  Goal: Patient physiologically stable as evidenced by normal lochia, palpable uterine involution and vitals within normal limits  Outcome: Progressing     The patient is Stable - Low risk of patient condition declining or worsening    Shift Goals  Clinical Goals: Pain control, no s/s infection  Patient Goals: Bonding with  and rest  Family Goals: TAHMINA    Progress made toward(s) clinical / shift goals:  Pain well controlled with medications given as per MAR, pt is able to ambulate and care for self and infant without difficulty. Lochia remains light without clots expressed, performing renzo care and pad changes independently. No s/s infection noted on assessment, remains afebrile.     Patient is not progressing towards the following goals: NA

## 2022-12-19 ENCOUNTER — GYNECOLOGY VISIT (OUTPATIENT)
Dept: OBGYN | Facility: CLINIC | Age: 32
End: 2022-12-19
Payer: COMMERCIAL

## 2022-12-19 VITALS — SYSTOLIC BLOOD PRESSURE: 95 MMHG | BODY MASS INDEX: 28.57 KG/M2 | DIASTOLIC BLOOD PRESSURE: 73 MMHG | WEIGHT: 177 LBS

## 2022-12-19 DIAGNOSIS — Z48.89 POSTOPERATIVE VISIT: ICD-10-CM

## 2022-12-19 PROCEDURE — 99024 POSTOP FOLLOW-UP VISIT: CPT | Performed by: OBSTETRICS & GYNECOLOGY

## 2022-12-19 ASSESSMENT — FIBROSIS 4 INDEX: FIB4 SCORE: 0.64

## 2022-12-19 NOTE — NON-PROVIDER
Pt here today for  Check   Delivery Date 2022   Operation Date: 2022  Currently: Breast feeding   LMP: No longer spotting stopped 3-4 days ago  Good ph:[phone]

## 2022-12-19 NOTE — PROGRESS NOTES
Chief complaint: Postoperative visit    SUBJECTIVE:  Osito Rivera presents to the clinic 2 weeks following primary  due to placenta previa    Eating a regular diet without difficulty.   Bowel movement are normal.    Patient not having any pain  Minimal vaginal bleeding.   Patient Denies Incisional pain, drainage or redness    OBJECTIVE:  BP (!) 95/73 (BP Location: Left arm, Patient Position: Sitting, BP Cuff Size: Adult)   Wt 177 lb   LMP 2022   BMI 28.57 kg/m²   Current Outpatient Medications on File Prior to Visit   Medication Sig Dispense Refill    ibuprofen (MOTRIN) 800 MG Tab Take 1 Tablet by mouth every 8 hours. Indications: Joint Damage causing Pain and Loss of Function 30 Tablet 0    liothyronine (CYTOMEL) 5 MCG Tab Take 1 Tablet by mouth every day. 90 Tablet 3    Prenatal Vit-Fe Fumarate-FA (PRENATAL VITAMIN PO) Take  by mouth.      levothyroxine (SYNTHROID) 112 MCG Tab TAKE 1 TABLET BY MOUTH EVERY DAY IN THE MORNING ON AN EMPTY STOMACH, NOT ON FORMULARY (Patient taking differently: TAKE 1 TABLET BY MOUTH DAY FOR  EVERYDAY) 90 Tablet 3    Cholecalciferol (VITAMIN D-3) 125 MCG (5000 UT) Tab Take  by mouth.      Prenatal MV-Min-Fe Fum-FA-DHA (PRENATAL 1 PO) Take  by mouth.      acetaminophen (TYLENOL) 500 MG Tab Take 2 Tablets by mouth every 6 hours. Do not exceed 4000 mg in 24 hours. 30 Tablet 0    sennosides-docusate sodium (SENOKOT-S) 8.6-50 MG tablet Take 1 Tablet by mouth 2 times a day as needed (Constipation). 30 Tablet 0     No current facility-administered medications on file prior to visit.       Constitutional:  alert, healthy, no distress.  Abdomen:  soft, bowel sounds active, non-tender, non-distended.  Incision:  healing well, no drainage, no erythema, no hernia, no seroma, no swelling, no dehiscence, incision well approximated.    IMPRESSION: Doing well postoperatively.  Pt is to increase activities as tolerated.    Lab:   Recent Results (from the past 1008 hour(s))    GRP B STREP, BY PCR ([facility])    Collection Time: 11/30/22  3:23 PM   Result Value Ref Range    Strep Gp B DNA PCR POSITIVE (A) Negative   COD - Adult (Type and Screen)    Collection Time: 12/04/22  8:05 AM   Result Value Ref Range    ABO Grouping Only O     Rh Grouping Only POS     Antibody Screen-Cod NEG    CBC WITH DIFFERENTIAL    Collection Time: 12/04/22  8:05 AM   Result Value Ref Range    WBC 8.9 4.8 - 10.8 K/uL    RBC 4.00 (L) 4.20 - 5.40 M/uL    Hemoglobin 12.7 12.0 - 16.0 g/dL    Hematocrit 37.3 37.0 - 47.0 %    MCV 93.3 81.4 - 97.8 fL    MCH 31.8 27.0 - 33.0 pg    MCHC 34.0 33.6 - 35.0 g/dL    RDW 44.1 35.9 - 50.0 fL    Platelet Count 225 164 - 446 K/uL    MPV 9.1 9.0 - 12.9 fL    Neutrophils-Polys 72.00 44.00 - 72.00 %    Lymphocytes 18.10 (L) 22.00 - 41.00 %    Monocytes 7.30 0.00 - 13.40 %    Eosinophils 0.70 0.00 - 6.90 %    Basophils 0.40 0.00 - 1.80 %    Immature Granulocytes 1.50 (H) 0.00 - 0.90 %    Nucleated RBC 0.00 /100 WBC    Neutrophils (Absolute) 6.41 2.00 - 7.15 K/uL    Lymphs (Absolute) 1.61 1.00 - 4.80 K/uL    Monos (Absolute) 0.65 0.00 - 0.85 K/uL    Eos (Absolute) 0.06 0.00 - 0.51 K/uL    Baso (Absolute) 0.04 0.00 - 0.12 K/uL    Immature Granulocytes (abs) 0.13 (H) 0.00 - 0.11 K/uL    NRBC (Absolute) 0.00 K/uL   CBC with Differential    Collection Time: 12/04/22  8:40 AM   Result Value Ref Range    WBC 8.6 4.8 - 10.8 K/uL    RBC 3.68 (L) 4.20 - 5.40 M/uL    Hemoglobin 11.7 (L) 12.0 - 16.0 g/dL    Hematocrit 34.9 (L) 37.0 - 47.0 %    MCV 94.8 81.4 - 97.8 fL    MCH 31.8 27.0 - 33.0 pg    MCHC 33.5 (L) 33.6 - 35.0 g/dL    RDW 44.6 35.9 - 50.0 fL    Platelet Count 201 164 - 446 K/uL    MPV 8.9 (L) 9.0 - 12.9 fL    Neutrophils-Polys 73.50 (H) 44.00 - 72.00 %    Lymphocytes 17.30 (L) 22.00 - 41.00 %    Monocytes 7.10 0.00 - 13.40 %    Eosinophils 0.50 0.00 - 6.90 %    Basophils 0.50 0.00 - 1.80 %    Immature Granulocytes 1.10 (H) 0.00 - 0.90 %    Nucleated RBC 0.00 /100 WBC    Neutrophils  (Absolute) 6.30 2.00 - 7.15 K/uL    Lymphs (Absolute) 1.48 1.00 - 4.80 K/uL    Monos (Absolute) 0.61 0.00 - 0.85 K/uL    Eos (Absolute) 0.04 0.00 - 0.51 K/uL    Baso (Absolute) 0.04 0.00 - 0.12 K/uL    Immature Granulocytes (abs) 0.09 0.00 - 0.11 K/uL    NRBC (Absolute) 0.00 K/uL   T.PALLIDUM AB BRITTNY (Syphilis)    Collection Time: 12/04/22  8:40 AM   Result Value Ref Range    Syphilis, Treponemal Qual Non-Reactive Non-Reactive   CBC without differential    Collection Time: 12/05/22  8:32 AM   Result Value Ref Range    WBC 9.6 4.8 - 10.8 K/uL    RBC 3.44 (L) 4.20 - 5.40 M/uL    Hemoglobin 10.9 (L) 12.0 - 16.0 g/dL    Hematocrit 33.1 (L) 37.0 - 47.0 %    MCV 96.2 81.4 - 97.8 fL    MCH 31.7 27.0 - 33.0 pg    MCHC 32.9 (L) 33.6 - 35.0 g/dL    RDW 46.1 35.9 - 50.0 fL    Platelet Count 179 164 - 446 K/uL    MPV 8.9 (L) 9.0 - 12.9 fL       PLAN:  Continue any current medications.  (See Med List for details.)  Return to clinic: in 4 week(s).     Incision healing well, no signs of infection.    All questions answered

## 2022-12-31 LAB
T3FREE SERPL-MCNC: 3.6 PG/ML (ref 2–4.4)
T4 FREE SERPL-MCNC: 1.44 NG/DL (ref 0.82–1.77)
TSH SERPL DL<=0.005 MIU/L-ACNC: 0.46 UIU/ML (ref 0.45–4.5)

## 2023-01-06 ENCOUNTER — TELEMEDICINE (OUTPATIENT)
Dept: ENDOCRINOLOGY | Facility: MEDICAL CENTER | Age: 33
End: 2023-01-06
Payer: COMMERCIAL

## 2023-01-06 DIAGNOSIS — E03.9 HYPOTHYROIDISM, ACQUIRED: ICD-10-CM

## 2023-01-06 DIAGNOSIS — E55.9 VITAMIN D DEFICIENCY: ICD-10-CM

## 2023-01-06 DIAGNOSIS — E06.3 HASHIMOTO'S DISEASE: ICD-10-CM

## 2023-01-06 PROCEDURE — 99214 OFFICE O/P EST MOD 30 MIN: CPT | Mod: 95

## 2023-01-06 RX ORDER — LEVOTHYROXINE SODIUM 112 UG/1
112 TABLET ORAL
Qty: 90 TABLET | Refills: 4 | Status: SHIPPED | OUTPATIENT
Start: 2023-01-06 | End: 2023-01-23 | Stop reason: SDUPTHER

## 2023-01-06 RX ORDER — LIOTHYRONINE SODIUM 5 UG/1
5 TABLET ORAL DAILY
Qty: 90 TABLET | Refills: 4 | Status: SHIPPED | OUTPATIENT
Start: 2023-01-06

## 2023-01-06 NOTE — PROGRESS NOTES
Chief Complaint: Follow-up on the following conditions  Patient was presented for a telehealth consultation via secure and encrypted videoconferencing technology. This encounter was conducted via Zoom . Verbal consent was obtained. Patient's identity was verified.   HPI:   Osito Rivera is a 32 y.o. female      1.  Hypothyroidism, acquired:  Diagnosed with hypothyroidism since age of 17  She initially was treated with Synthroid, then switched to Tirosint and currently she is taking Synthroid again since Tirosint got too expensive    She is 1 month postpartum  She is currently on Synthroid 112 mcg daily and She is also taking Liothyronine 5 mcg daily around in the am  She reports good compliance and takes her thyroid hormone daily before breakfast.   She denies taking any iron, calcium supplements or antacids.   Denies taking multivitamins    Denies constipation, palpitations, diarrhea, fatigue  Reports dry feet          Latest Reference Range & Units 12/30/22 06:12   TSH 0.450 - 4.500 uIU/mL 0.462   Free T-4 0.82 - 1.77 ng/dL 1.44   T3,Free 2.0 - 4.4 pg/mL 3.6        Latest Reference Range & Units 9/21/22 08:13   TSH 0.450 - 4.500 uIU/mL 0.487   Free T-4 0.82 - 1.77 ng/dL 1.04   T3,Free 2.0 - 4.4 pg/mL 2.5     2.  Hashimoto's disease:  Reports elevated antibody levels and etiology of her hypothyroidism    3.  Vitamin D deficiency:  Currently taking 5000 IUs OTC daily vitamin D3   Latest Reference Range & Units 01/28/21 11:32   25-Hydroxy   Vitamin D 25 30 - 100 ng/mL 42         Patient's medications, allergies, and social histories were reviewed and updated as appropriate.      ROS:     CONS:     No fever, no chills, no weight loss, no fatigue   EYES:      No diplopia, no blurry vision, no redness of eyes, no swelling of eyelids   ENT:    No hearing loss, No ear pain, No sore throat, no dysphagia, no neck swelling   CV:     No chest pain, no palpitations, no claudication, no orthopnea, no PND   PULM:    No  SOB, no cough, no hemoptysis, no wheezing    GI:   No nausea, no vomiting, no diarrhea, no constipation, no bloody stools   :  Passing urine well, no dysuria, no hematuria   ENDO:   No polyuria, no polydipsia, no heat intolerance, no cold intolerance   NEURO: No headaches, no dizziness, no convulsions, no tremors   MUSC:  No joint swellings, no arthralgias, no myalgias, no weakness   SKIN:   No rash, no ulcers, no dry skin   PSYCH:   No depression, no anxiety, no difficulty sleeping       Past Medical History:  Patient Active Problem List    Diagnosis Date Noted    Postoperative visit 2022    Labor and delivery indication for care or intervention 2022    Placenta previa in third trimester 2022    Vasa previa 10/05/2022    Pregnancy, supervision, high-risk, third trimester 10/05/2022    36 weeks gestation of pregnancy 2022    Lip swelling 2020    Skin irritation 2020    Hypothyroidism, acquired, autoimmune 2019    Annual physical exam 2018    Hashimoto's thyroiditis 09/15/2017    Vitamin D deficiency 09/15/2017       Past Surgical History:  Past Surgical History:   Procedure Laterality Date    PRIMARY C SECTION N/A 2022    Procedure:  SECTION, PRIMARY;  Surgeon: Kota Randolph M.D.;  Location: [facility];  Service: Labor and Delivery        Allergies:  Penicillins     Current Medications:    Current Outpatient Medications:     acetaminophen (TYLENOL) 500 MG Tab, Take 2 Tablets by mouth every 6 hours. Do not exceed 4000 mg in 24 hours., Disp: 30 Tablet, Rfl: 0    ibuprofen (MOTRIN) 800 MG Tab, Take 1 Tablet by mouth every 8 hours. Indications: Joint Damage causing Pain and Loss of Function, Disp: 30 Tablet, Rfl: 0    sennosides-docusate sodium (SENOKOT-S) 8.6-50 MG tablet, Take 1 Tablet by mouth 2 times a day as needed (Constipation)., Disp: 30 Tablet, Rfl: 0    liothyronine (CYTOMEL) 5 MCG Tab, Take 1 Tablet by mouth every day., Disp:  90 Tablet, Rfl: 3    Prenatal Vit-Fe Fumarate-FA (PRENATAL VITAMIN PO), Take  by mouth., Disp: , Rfl:     levothyroxine (SYNTHROID) 112 MCG Tab, TAKE 1 TABLET BY MOUTH EVERY DAY IN THE MORNING ON AN EMPTY STOMACH, NOT ON FORMULARY (Patient taking differently: TAKE 1 TABLET BY MOUTH DAY FOR  EVERYDAY), Disp: 90 Tablet, Rfl: 3    Cholecalciferol (VITAMIN D-3) 125 MCG (5000 UT) Tab, Take  by mouth., Disp: , Rfl:     Prenatal MV-Min-Fe Fum-FA-DHA (PRENATAL 1 PO), Take  by mouth., Disp: , Rfl:     Social History:  Social History     Socioeconomic History    Marital status:      Spouse name: Not on file    Number of children: Not on file    Years of education: Not on file    Highest education level: Not on file   Occupational History    Not on file   Tobacco Use    Smoking status: Never    Smokeless tobacco: Never   Vaping Use    Vaping Use: Never used   Substance and Sexual Activity    Alcohol use: Not Currently     Comment: rarely    Drug use: No    Sexual activity: Yes     Partners: Male     Comment: , one children, pregnant. Planned pregnancy. FOB is involved and supportive.   Other Topics Concern    Not on file   Social History Narrative    Not on file     Social Determinants of Health     Financial Resource Strain: Not on file   Food Insecurity: Not on file   Transportation Needs: Not on file   Physical Activity: Not on file   Stress: Not on file   Social Connections: Not on file   Intimate Partner Violence: Not on file   Housing Stability: Not on file        Family History:   Family History   Problem Relation Age of Onset    Cancer Father         Skin    Diabetes Father         Type II    Cancer Maternal Grandfather         Lung    Cancer Sister         Skin and Cervical    Psychiatric Illness Sister         Bipolar    Psychiatric Illness Paternal Aunt         Bipolar    Psychiatric Illness Maternal Aunt         Schizophrenia    Diabetes Maternal Uncle         Type I    Diabetes Maternal Aunt          Type I    Diabetes Maternal Aunt         Type I    Diabetes Paternal Uncle         Type I         PHYSICAL EXAM:   Vital signs: virtual visit  GENERAL: Well-developed, well-nourished  in no apparent distress.       Labs:    Lab Results   Component Value Date/Time    TSHULTRASEN 0.040 (L) 06/08/2021 1112     Lab Results   Component Value Date/Time    FREET4 1.27 06/08/2021 1112     Lab Results   Component Value Date/Time    FREET3 2.66 06/08/2021 1112         Imaging:      ASSESSMENT/PLAN:   1. Hypothyroidism, acquired  Clinically stable for the most part, except reports with dry feet  Biochemically stable  Patient is agreeable to continue same dose of brand-name Synthroid 112 mcg daily and Cytomel 5 mcg daily  All prescriptions were sent to pharmacy today  - TSH; Future  - FREE THYROXINE; Future  - T3 FREE; Future  - Comp Metabolic Panel; Future  - levothyroxine (SYNTHROID) 112 MCG Tab; Take 1 Tablet by mouth every morning on an empty stomach.  Dispense: 90 Tablet; Refill: 4    2. Hashimoto's disease  This is etiology of her hypothyroidism    3. Vitamin D deficiency  Stable  Continue regimen-HPI  - VITAMIN D,25 HYDROXY (DEFICIENCY); Future     Disposition: Follow-up in 4 months  Do your blood work 1 to 2 weeks prior to next appointment    Thank you kindly for allowing me to participate in the thyroid care plan for this patient.    Javon Nur, [email or web address].[email or web address].N.  01/06/2023    CC:   Dm Irvin P.A.-C.

## 2023-01-18 ASSESSMENT — EDINBURGH POSTNATAL DEPRESSION SCALE (EPDS)
I HAVE BEEN SO UNHAPPY THAT I HAVE HAD DIFFICULTY SLEEPING: NOT AT ALL
THINGS HAVE BEEN GETTING ON TOP OF ME: NO, I HAVE BEEN COPING AS WELL AS EVER
I HAVE BLAMED MYSELF UNNECESSARILY WHEN THINGS WENT WRONG: NO, NEVER
I HAVE BEEN ABLE TO LAUGH AND SEE THE FUNNY SIDE OF THINGS: AS MUCH AS I ALWAYS COULD
I HAVE BEEN ANXIOUS OR WORRIED FOR NO GOOD REASON: NO, NOT AT ALL
I HAVE FELT SCARED OR PANICKY FOR NO GOOD REASON: NO, NOT AT ALL
I HAVE BEEN SO UNHAPPY THAT I HAVE BEEN CRYING: NO, NEVER
THE THOUGHT OF HARMING MYSELF HAS OCCURRED TO ME: NEVER
I HAVE LOOKED FORWARD WITH ENJOYMENT TO THINGS: AS MUCH AS I EVER DID
TOTAL SCORE: 0
I HAVE FELT SAD OR MISERABLE: NO, NOT AT ALL

## 2023-01-19 ENCOUNTER — POST PARTUM (OUTPATIENT)
Dept: OBGYN | Facility: CLINIC | Age: 33
End: 2023-01-19
Payer: COMMERCIAL

## 2023-01-19 PROCEDURE — 99024 POSTOP FOLLOW-UP VISIT: CPT | Performed by: OBSTETRICS & GYNECOLOGY

## 2023-01-20 NOTE — PROGRESS NOTES
Osito Rivera is a 32 y.o. female who presents for her Postpartum Exam      HPI Comments: Pt presents for postpartum exam s/p  for placenta previa on 2022. Patient is without complaints.  Baby doing well.  Breastfeeding.  No depression/anxiety.  Not sexually active.  Lochia resolved.  Desires nothing for contraception.  Reports spouse is getting a vasectomy    Review of Systems:   Pertinent positives documented in HPI and all other systems reviewed & are negative    Last pap: , normal    Physical exam:   Vital measurements:  There were no vitals taken for this visit.  There is no height or weight on file to calculate BMI. (Goal BMI>18 <25)  Nursing note and vitals reviewed.  Gen: She is oriented to person, place, and time. She appears well-developed and well-nourished. No distress.   Abdomen: soft, nontender, nondistended, no rebound/guarding  Extremities: nontender, no clubbing, cyanosis or edema  Pelvic: Normal external female genitalia, well-healed perineum from delivery, cervix is normal, uterus approximately 8-10 weeks in size non tender, adnexa bilaterally normal with no dominant masses    A/P: Postpartum status post  for placenta previa  Doing well without complaints  Continue prenatal vitamins if breast feeding  May resume normal activities including exercise, tampons, and intercourse  Contraception none at this time  Follow up in 1 year for annual exam or sooner as needed

## 2023-01-23 DIAGNOSIS — E03.9 HYPOTHYROIDISM, ACQUIRED: ICD-10-CM

## 2023-01-23 RX ORDER — LEVOTHYROXINE SODIUM 112 UG/1
112 TABLET ORAL
Qty: 90 TABLET | Refills: 4 | Status: SHIPPED | OUTPATIENT
Start: 2023-01-23 | End: 2023-04-10 | Stop reason: SDUPTHER

## 2023-03-12 SDOH — ECONOMIC STABILITY: HOUSING INSECURITY
IN THE LAST 12 MONTHS, WAS THERE A TIME WHEN YOU DID NOT HAVE A STEADY PLACE TO SLEEP OR SLEPT IN A SHELTER (INCLUDING NOW)?: NO

## 2023-03-12 SDOH — ECONOMIC STABILITY: INCOME INSECURITY: HOW HARD IS IT FOR YOU TO PAY FOR THE VERY BASICS LIKE FOOD, HOUSING, MEDICAL CARE, AND HEATING?: NOT HARD AT ALL

## 2023-03-12 SDOH — HEALTH STABILITY: PHYSICAL HEALTH: ON AVERAGE, HOW MANY DAYS PER WEEK DO YOU ENGAGE IN MODERATE TO STRENUOUS EXERCISE (LIKE A BRISK WALK)?: 3 DAYS

## 2023-03-12 SDOH — ECONOMIC STABILITY: FOOD INSECURITY: WITHIN THE PAST 12 MONTHS, THE FOOD YOU BOUGHT JUST DIDN'T LAST AND YOU DIDN'T HAVE MONEY TO GET MORE.: NEVER TRUE

## 2023-03-12 SDOH — ECONOMIC STABILITY: INCOME INSECURITY: IN THE LAST 12 MONTHS, WAS THERE A TIME WHEN YOU WERE NOT ABLE TO PAY THE MORTGAGE OR RENT ON TIME?: NO

## 2023-03-12 SDOH — ECONOMIC STABILITY: FOOD INSECURITY: WITHIN THE PAST 12 MONTHS, YOU WORRIED THAT YOUR FOOD WOULD RUN OUT BEFORE YOU GOT MONEY TO BUY MORE.: NEVER TRUE

## 2023-03-12 SDOH — ECONOMIC STABILITY: TRANSPORTATION INSECURITY
IN THE PAST 12 MONTHS, HAS THE LACK OF TRANSPORTATION KEPT YOU FROM MEDICAL APPOINTMENTS OR FROM GETTING MEDICATIONS?: NO

## 2023-03-12 SDOH — ECONOMIC STABILITY: HOUSING INSECURITY: IN THE LAST 12 MONTHS, HOW MANY PLACES HAVE YOU LIVED?: 1

## 2023-03-12 SDOH — ECONOMIC STABILITY: TRANSPORTATION INSECURITY
IN THE PAST 12 MONTHS, HAS LACK OF RELIABLE TRANSPORTATION KEPT YOU FROM MEDICAL APPOINTMENTS, MEETINGS, WORK OR FROM GETTING THINGS NEEDED FOR DAILY LIVING?: NO

## 2023-03-12 SDOH — HEALTH STABILITY: PHYSICAL HEALTH: ON AVERAGE, HOW MANY MINUTES DO YOU ENGAGE IN EXERCISE AT THIS LEVEL?: 30 MIN

## 2023-03-12 SDOH — HEALTH STABILITY: MENTAL HEALTH
STRESS IS WHEN SOMEONE FEELS TENSE, NERVOUS, ANXIOUS, OR CAN'T SLEEP AT NIGHT BECAUSE THEIR MIND IS TROUBLED. HOW STRESSED ARE YOU?: NOT AT ALL

## 2023-03-12 SDOH — ECONOMIC STABILITY: TRANSPORTATION INSECURITY
IN THE PAST 12 MONTHS, HAS LACK OF TRANSPORTATION KEPT YOU FROM MEETINGS, WORK, OR FROM GETTING THINGS NEEDED FOR DAILY LIVING?: NO

## 2023-03-12 ASSESSMENT — SOCIAL DETERMINANTS OF HEALTH (SDOH)
HOW MANY DRINKS CONTAINING ALCOHOL DO YOU HAVE ON A TYPICAL DAY WHEN YOU ARE DRINKING: PATIENT DOES NOT DRINK
DO YOU BELONG TO ANY CLUBS OR ORGANIZATIONS SUCH AS CHURCH GROUPS UNIONS, FRATERNAL OR ATHLETIC GROUPS, OR SCHOOL GROUPS?: NO
IN A TYPICAL WEEK, HOW MANY TIMES DO YOU TALK ON THE PHONE WITH FAMILY, FRIENDS, OR NEIGHBORS?: MORE THAN THREE TIMES A WEEK
HOW OFTEN DO YOU ATTEND CHURCH OR RELIGIOUS SERVICES?: PATIENT DECLINED
WITHIN THE PAST 12 MONTHS, YOU WORRIED THAT YOUR FOOD WOULD RUN OUT BEFORE YOU GOT THE MONEY TO BUY MORE: NEVER TRUE
HOW OFTEN DO YOU HAVE A DRINK CONTAINING ALCOHOL: NEVER
HOW OFTEN DO YOU GET TOGETHER WITH FRIENDS OR RELATIVES?: ONCE A WEEK
HOW OFTEN DO YOU ATTEND CHURCH OR RELIGIOUS SERVICES?: PATIENT DECLINED
HOW OFTEN DO YOU HAVE SIX OR MORE DRINKS ON ONE OCCASION: NEVER
HOW OFTEN DO YOU ATTENT MEETINGS OF THE CLUB OR ORGANIZATION YOU BELONG TO?: NEVER
HOW HARD IS IT FOR YOU TO PAY FOR THE VERY BASICS LIKE FOOD, HOUSING, MEDICAL CARE, AND HEATING?: NOT HARD AT ALL
HOW OFTEN DO YOU GET TOGETHER WITH FRIENDS OR RELATIVES?: ONCE A WEEK
IN A TYPICAL WEEK, HOW MANY TIMES DO YOU TALK ON THE PHONE WITH FAMILY, FRIENDS, OR NEIGHBORS?: MORE THAN THREE TIMES A WEEK
HOW OFTEN DO YOU ATTENT MEETINGS OF THE CLUB OR ORGANIZATION YOU BELONG TO?: NEVER
DO YOU BELONG TO ANY CLUBS OR ORGANIZATIONS SUCH AS CHURCH GROUPS UNIONS, FRATERNAL OR ATHLETIC GROUPS, OR SCHOOL GROUPS?: NO

## 2023-03-12 ASSESSMENT — LIFESTYLE VARIABLES
HOW MANY STANDARD DRINKS CONTAINING ALCOHOL DO YOU HAVE ON A TYPICAL DAY: PATIENT DOES NOT DRINK
HOW OFTEN DO YOU HAVE A DRINK CONTAINING ALCOHOL: NEVER
AUDIT-C TOTAL SCORE: 0
SKIP TO QUESTIONS 9-10: 1
HOW OFTEN DO YOU HAVE SIX OR MORE DRINKS ON ONE OCCASION: NEVER

## 2023-03-15 ENCOUNTER — OFFICE VISIT (OUTPATIENT)
Dept: MEDICAL GROUP | Facility: MEDICAL CENTER | Age: 33
End: 2023-03-15
Payer: COMMERCIAL

## 2023-03-15 VITALS
SYSTOLIC BLOOD PRESSURE: 90 MMHG | HEART RATE: 101 BPM | OXYGEN SATURATION: 94 % | BODY MASS INDEX: 28.77 KG/M2 | TEMPERATURE: 97.2 F | DIASTOLIC BLOOD PRESSURE: 50 MMHG | HEIGHT: 66 IN | WEIGHT: 179 LBS

## 2023-03-15 DIAGNOSIS — Z00.00 ANNUAL PHYSICAL EXAM: ICD-10-CM

## 2023-03-15 DIAGNOSIS — L30.9 DERMATITIS: ICD-10-CM

## 2023-03-15 PROBLEM — Z3A.36 36 WEEKS GESTATION OF PREGNANCY: Status: RESOLVED | Noted: 2022-05-23 | Resolved: 2023-03-15

## 2023-03-15 PROBLEM — O09.93 PREGNANCY, SUPERVISION, HIGH-RISK, THIRD TRIMESTER: Status: RESOLVED | Noted: 2022-10-05 | Resolved: 2023-03-15

## 2023-03-15 PROBLEM — R22.0 LIP SWELLING: Status: RESOLVED | Noted: 2020-01-30 | Resolved: 2023-03-15

## 2023-03-15 PROBLEM — Z48.89 POSTOPERATIVE VISIT: Status: RESOLVED | Noted: 2022-12-19 | Resolved: 2023-03-15

## 2023-03-15 PROBLEM — O44.03 PLACENTA PREVIA IN THIRD TRIMESTER: Status: RESOLVED | Noted: 2022-11-14 | Resolved: 2023-03-15

## 2023-03-15 PROBLEM — R23.8 SKIN IRRITATION: Status: RESOLVED | Noted: 2020-01-30 | Resolved: 2023-03-15

## 2023-03-15 PROCEDURE — 99395 PREV VISIT EST AGE 18-39: CPT | Performed by: PHYSICIAN ASSISTANT

## 2023-03-15 RX ORDER — TRIAMCINOLONE ACETONIDE 1 MG/G
1 CREAM TOPICAL 3 TIMES DAILY
Qty: 80 G | Refills: 1 | Status: SHIPPED | OUTPATIENT
Start: 2023-03-15 | End: 2023-05-12

## 2023-03-15 ASSESSMENT — FIBROSIS 4 INDEX: FIB4 SCORE: 0.67

## 2023-03-15 ASSESSMENT — PATIENT HEALTH QUESTIONNAIRE - PHQ9: CLINICAL INTERPRETATION OF PHQ2 SCORE: 0

## 2023-03-15 NOTE — PROGRESS NOTES
Subjective:   Osito Rivera is a 33 y.o. female here today for annual physical.    Annual physical exam  This is a pleasant 33-year-old female here today for an annual physical.  Requesting labs.  Seen by endocrinology earlier this year and labs were ordered for her history of hypothyroidism.  She is on Synthroid as well as Cytomel.  Recently had her second child.  She is done having children after 2.  Plan is for her  to have a vasectomy.  He is almost 31.  They do not want more children.  She complains of some dryness and peeling of her skin of the palms of her hands.  She states in January she had hand-foot-and-mouth disease.  He is concerned that maybe this skin presentation is from a fungal infection.       Current medicines (including changes today)  Current Outpatient Medications   Medication Sig Dispense Refill    Omega-3 Fatty Acids (OMEGA 3 500 PO) Take  by mouth.      triamcinolone acetonide (KENALOG) 0.1 % Cream Apply 1 Application. topically 3 times a day. 80 g 1    levothyroxine (SYNTHROID) 112 MCG Tab Take 1 Tablet by mouth every morning on an empty stomach. 90 Tablet 4    liothyronine (CYTOMEL) 5 MCG Tab Take 1 Tablet by mouth every day. 90 Tablet 4    Cholecalciferol (VITAMIN D-3) 125 MCG (5000 UT) Tab Take  by mouth.       No current facility-administered medications for this visit.     She  has a past medical history of Asthma, Hypotension, Pregnant (April 24, 2022), and Thyroid disease.    She has no past medical history of Acute nasopharyngitis, Addisons disease ([facility]), Adrenal disorder ([facility]), Allergy, Anemia, Anesthesia, Anginal syndrome ([facility]), Anxiety, Arrhythmia, Arthritis, Blood clotting disorder ([facility]), Blood transfusion without reported diagnosis, Bowel habit changes, Breath shortness, Bronchitis, Cancer ([facility]), Carcinoma in situ of respiratory system, Cataract, CHF (congestive heart failure) ([facility]), Clotting disorder (HCC), Congestive heart failure (HCC), Continuous ambulatory  "peritoneal dialysis status (HCC), COPD (chronic obstructive pulmonary disease) (HCC), Coughing blood, Cushings syndrome (HCC), Dental disorder, Depression, Diabetes (HCC), Diabetic neuropathy (HCC), Dialysis patient (HCC), Emphysema of lung (HCC), GERD (gastroesophageal reflux disease), Glaucoma, Goiter, Gynecological disorder, Head ache, Heart attack (HCC), Heart burn, Heart murmur, Heart valve disease, Hemorrhagic disorder (HCC), Hepatitis A, Hepatitis B, Hepatitis C, Hiatus hernia syndrome, High cholesterol, HIV (human immunodeficiency virus infection) (HCC), Hyperlipidemia, Hypertension, IBD (inflammatory bowel disease), Indigestion, Infectious disease, Jaundice, Kidney disease, Meningitis, Migraine, Muscle disorder, Myocardial infarct (HCC), Osteoporosis, Pacemaker, Pain, Parathyroid disorder (HCC), Pituitary disease (HCC), Pneumonia, Psychiatric problem, Pulmonary emphysema (HCC), Renal disorder, Rheumatic fever, Seizure (HCC), Sickle cell disease (HCC), Sleep apnea, Snoring, Stroke (HCC), Substance abuse (HCC), Tuberculosis, Urinary bladder disorder, Urinary incontinence, or Urinary tract infection.    Social History and Family History were reviewed and updated.    ROS   No chest pain, no shortness of breath, no abdominal pain and all other systems were reviewed and are negative.       Objective:     BP 90/50 (BP Location: Right arm, Patient Position: Sitting, BP Cuff Size: Adult)   Pulse (!) 101   Temp 36.2 °C (97.2 °F) (Temporal)   Ht 1.676 m (5' 6\")   Wt 81.2 kg (179 lb)   SpO2 94%  Body mass index is 28.89 kg/m².   Physical Exam:  Constitutional: Alert, no distress.  Skin: Warm, dry, good turgor, no rashes in visible areas.  Mild areas on the palmar aspect of her hands that are dry with peeling skin.  No erythema.    Eye: Equal, round and reactive, conjunctiva clear, lids normal.  ENMT: Lips without lesions, good dentition, oropharynx clear.  Neck: Trachea midline, no masses.   Lymph: No cervical or " supraclavicular lymphadenopathy  Respiratory: Unlabored respiratory effort, lungs appear clear.  Psych: Alert and oriented x3, normal affect and mood.        Assessment and Plan:   The following treatment plan was discussed    1. Annual physical exam  Generally healthy female.  Ordered labs.  Fast 8 hours.  May perform these labs with endos labs.  Discuss vasectomy as well as contacting [facility] for her 's sake.  - Lipid Profile; Future  - CBC WITH DIFFERENTIAL; Future  - HEMOGLOBIN A1C; Future    2. Dermatitis  Acute, new onset condition.  Could be residual issues with hand-foot-and-mouth disease.  At this point we will provide triamcinolone cream and use as directed.  Advised the presentation is not suggestive of a fungal infection.  Expect symptoms to gradually improve.  - triamcinolone acetonide (KENALOG) 0.1 % Cream; Apply 1 Application. topically 3 times a day.  Dispense: 80 g; Refill: 1         Followup: Return in about 1 year (around 3/15/2024), or if symptoms worsen or fail to improve.    Please note that this dictation was created using voice recognition software. I have made every reasonable attempt to correct obvious errors, but I expect that there are errors of grammar and possibly content that I did not discover before finalizing the note.

## 2023-03-15 NOTE — ASSESSMENT & PLAN NOTE
This is a pleasant 33-year-old female here today for an annual physical.  Requesting labs.  Seen by endocrinology earlier this year and labs were ordered for her history of hypothyroidism.  She is on Synthroid as well as Cytomel.  Recently had her second child.  She is done having children after 2.  Plan is for her  to have a vasectomy.  He is almost 31.  They do not want more children.  She complains of some dryness and peeling of her skin of the palms of her hands.  She states in January she had hand-foot-and-mouth disease.  He is concerned that maybe this skin presentation is from a fungal infection.

## 2023-04-10 DIAGNOSIS — E03.9 HYPOTHYROIDISM, ACQUIRED: ICD-10-CM

## 2023-04-10 RX ORDER — LEVOTHYROXINE SODIUM 112 UG/1
112 TABLET ORAL
Qty: 90 TABLET | Refills: 4 | Status: SHIPPED | OUTPATIENT
Start: 2023-04-10 | End: 2023-04-25 | Stop reason: CLARIF

## 2023-04-10 NOTE — TELEPHONE ENCOUNTER
Received request via: Pharmacy    Was the patient seen in the last year in this department? Yes    Does the patient have an active prescription (recently filled or refills available) for medication(s) requested? No    Does the patient have long term Plus and need 100 day supply (blood pressure, diabetes and cholesterol meds only)? Patient does not have SCP

## 2023-04-25 DIAGNOSIS — E03.9 HYPOTHYROIDISM, ACQUIRED: ICD-10-CM

## 2023-04-25 RX ORDER — LEVOTHYROXINE SODIUM 112 MCG
112 TABLET ORAL
Qty: 90 TABLET | Refills: 1 | Status: SHIPPED | OUTPATIENT
Start: 2023-04-25 | End: 2023-11-17 | Stop reason: SDUPTHER

## 2023-04-26 ENCOUNTER — PATIENT MESSAGE (OUTPATIENT)
Dept: ENDOCRINOLOGY | Facility: MEDICAL CENTER | Age: 33
End: 2023-04-26
Payer: COMMERCIAL

## 2023-05-03 NOTE — PATIENT COMMUNICATION
Called and verified with [facility], medication was already shipped out yesterday and nothing was needed from our office.     Thank you,

## 2023-05-11 ENCOUNTER — HOSPITAL ENCOUNTER (OUTPATIENT)
Dept: LAB | Facility: MEDICAL CENTER | Age: 33
End: 2023-05-11
Attending: PHYSICIAN ASSISTANT
Payer: COMMERCIAL

## 2023-05-11 ENCOUNTER — HOSPITAL ENCOUNTER (OUTPATIENT)
Dept: LAB | Facility: MEDICAL CENTER | Age: 33
End: 2023-05-11
Payer: COMMERCIAL

## 2023-05-11 DIAGNOSIS — E55.9 VITAMIN D DEFICIENCY: ICD-10-CM

## 2023-05-11 DIAGNOSIS — Z00.00 ANNUAL PHYSICAL EXAM: ICD-10-CM

## 2023-05-11 DIAGNOSIS — E03.9 HYPOTHYROIDISM, ACQUIRED: ICD-10-CM

## 2023-05-11 LAB
25(OH)D3 SERPL-MCNC: 47 NG/ML (ref 30–100)
ALBUMIN SERPL BCP-MCNC: 4.4 G/DL (ref 3.2–4.9)
ALBUMIN/GLOB SERPL: 1.5 G/DL
ALP SERPL-CCNC: 71 U/L (ref 30–99)
ALT SERPL-CCNC: 17 U/L (ref 2–50)
ANION GAP SERPL CALC-SCNC: 10 MMOL/L (ref 7–16)
AST SERPL-CCNC: 19 U/L (ref 12–45)
BASOPHILS # BLD AUTO: 0.3 % (ref 0–1.8)
BASOPHILS # BLD: 0.02 K/UL (ref 0–0.12)
BILIRUB SERPL-MCNC: 0.4 MG/DL (ref 0.1–1.5)
BUN SERPL-MCNC: 12 MG/DL (ref 8–22)
CALCIUM ALBUM COR SERPL-MCNC: 9.4 MG/DL (ref 8.5–10.5)
CALCIUM SERPL-MCNC: 9.7 MG/DL (ref 8.5–10.5)
CHLORIDE SERPL-SCNC: 104 MMOL/L (ref 96–112)
CHOLEST SERPL-MCNC: 192 MG/DL (ref 100–199)
CO2 SERPL-SCNC: 26 MMOL/L (ref 20–33)
CREAT SERPL-MCNC: 0.91 MG/DL (ref 0.5–1.4)
EOSINOPHIL # BLD AUTO: 0.09 K/UL (ref 0–0.51)
EOSINOPHIL NFR BLD: 1.6 % (ref 0–6.9)
ERYTHROCYTE [DISTWIDTH] IN BLOOD BY AUTOMATED COUNT: 42.4 FL (ref 35.9–50)
EST. AVERAGE GLUCOSE BLD GHB EST-MCNC: 108 MG/DL
FASTING STATUS PATIENT QL REPORTED: NORMAL
GFR SERPLBLD CREATININE-BSD FMLA CKD-EPI: 86 ML/MIN/1.73 M 2
GLOBULIN SER CALC-MCNC: 2.9 G/DL (ref 1.9–3.5)
GLUCOSE SERPL-MCNC: 83 MG/DL (ref 65–99)
HBA1C MFR BLD: 5.4 % (ref 4–5.6)
HCT VFR BLD AUTO: 43.5 % (ref 37–47)
HDLC SERPL-MCNC: 60 MG/DL
HGB BLD-MCNC: 14.6 G/DL (ref 12–16)
IMM GRANULOCYTES # BLD AUTO: 0.02 K/UL (ref 0–0.11)
IMM GRANULOCYTES NFR BLD AUTO: 0.3 % (ref 0–0.9)
LDLC SERPL CALC-MCNC: 115 MG/DL
LYMPHOCYTES # BLD AUTO: 2.27 K/UL (ref 1–4.8)
LYMPHOCYTES NFR BLD: 39.3 % (ref 22–41)
MCH RBC QN AUTO: 30.7 PG (ref 27–33)
MCHC RBC AUTO-ENTMCNC: 33.6 G/DL (ref 33.6–35)
MCV RBC AUTO: 91.6 FL (ref 81.4–97.8)
MONOCYTES # BLD AUTO: 0.44 K/UL (ref 0–0.85)
MONOCYTES NFR BLD AUTO: 7.6 % (ref 0–13.4)
NEUTROPHILS # BLD AUTO: 2.93 K/UL (ref 2–7.15)
NEUTROPHILS NFR BLD: 50.9 % (ref 44–72)
NRBC # BLD AUTO: 0 K/UL
NRBC BLD-RTO: 0 /100 WBC
PLATELET # BLD AUTO: 291 K/UL (ref 164–446)
PMV BLD AUTO: 9.3 FL (ref 9–12.9)
POTASSIUM SERPL-SCNC: 4.2 MMOL/L (ref 3.6–5.5)
PROT SERPL-MCNC: 7.3 G/DL (ref 6–8.2)
RBC # BLD AUTO: 4.75 M/UL (ref 4.2–5.4)
SODIUM SERPL-SCNC: 140 MMOL/L (ref 135–145)
T3FREE SERPL-MCNC: 2.59 PG/ML (ref 2–4.4)
T4 FREE SERPL-MCNC: 1.24 NG/DL (ref 0.93–1.7)
TRIGL SERPL-MCNC: 85 MG/DL (ref 0–149)
TSH SERPL DL<=0.005 MIU/L-ACNC: 0.33 UIU/ML (ref 0.38–5.33)
WBC # BLD AUTO: 5.8 K/UL (ref 4.8–10.8)

## 2023-05-11 PROCEDURE — 84443 ASSAY THYROID STIM HORMONE: CPT

## 2023-05-11 PROCEDURE — 85025 COMPLETE CBC W/AUTO DIFF WBC: CPT

## 2023-05-11 PROCEDURE — 80053 COMPREHEN METABOLIC PANEL: CPT

## 2023-05-11 PROCEDURE — 80061 LIPID PANEL: CPT

## 2023-05-11 PROCEDURE — 82306 VITAMIN D 25 HYDROXY: CPT

## 2023-05-11 PROCEDURE — 83036 HEMOGLOBIN GLYCOSYLATED A1C: CPT

## 2023-05-11 PROCEDURE — 84439 ASSAY OF FREE THYROXINE: CPT

## 2023-05-11 PROCEDURE — 84481 FREE ASSAY (FT-3): CPT

## 2023-05-11 PROCEDURE — 36415 COLL VENOUS BLD VENIPUNCTURE: CPT

## 2023-05-12 ENCOUNTER — OFFICE VISIT (OUTPATIENT)
Dept: ENDOCRINOLOGY | Facility: MEDICAL CENTER | Age: 33
End: 2023-05-12
Attending: INTERNAL MEDICINE
Payer: COMMERCIAL

## 2023-05-12 VITALS — SYSTOLIC BLOOD PRESSURE: 122 MMHG | DIASTOLIC BLOOD PRESSURE: 70 MMHG

## 2023-05-12 DIAGNOSIS — E06.3 HASHIMOTO'S DISEASE: ICD-10-CM

## 2023-05-12 DIAGNOSIS — E55.9 VITAMIN D DEFICIENCY: ICD-10-CM

## 2023-05-12 DIAGNOSIS — E03.9 HYPOTHYROIDISM, ACQUIRED: ICD-10-CM

## 2023-05-12 PROCEDURE — 3078F DIAST BP <80 MM HG: CPT

## 2023-05-12 PROCEDURE — 3074F SYST BP LT 130 MM HG: CPT

## 2023-05-12 PROCEDURE — 99214 OFFICE O/P EST MOD 30 MIN: CPT

## 2023-05-12 PROCEDURE — 99211 OFF/OP EST MAY X REQ PHY/QHP: CPT | Performed by: INTERNAL MEDICINE

## 2023-05-12 ASSESSMENT — FIBROSIS 4 INDEX: FIB4 SCORE: 0.49

## 2023-05-12 NOTE — PROGRESS NOTES
Chief Complaint: Follow-up on the following conditions    HPI:   Osito Rivera is a 32 y.o. female      1.  Hypothyroidism, acquired:  Diagnosed with hypothyroidism since age of 17  She initially was treated with Synthroid, then switched to Tirosint and currently she is taking Synthroid again since Tirosint got too expensive    She is 5 month postpartum  She is currently on Synthroid 112 mcg daily and She is also taking Liothyronine 5 mcg daily around in the am  She reports good compliance and takes her thyroid hormone daily before breakfast. -forgets sometimes but not often   She denies taking any iron, calcium  or antacids.   Denies taking multivitamins  Denies taking biotin     Denies constipation, palpitations, diarrhea, fatigue, brain fogginess     Latest Reference Range & Units 05/11/23 06:46   TSH 0.380 - 5.330 uIU/mL 0.330 (L)   Free T-4 0.93 - 1.70 ng/dL 1.24   T3,Free 2.00 - 4.40 pg/mL 2.59      Latest Reference Range & Units 05/11/23 06:48   Glycohemoglobin 4.0 - 5.6 % 5.4     2.  Hashimoto's disease:  Reports elevated antibody levels and etiology of her hypothyroidism    3.  Vitamin D deficiency:  Currently taking 5000 IUs OTC daily vitamin D3   Latest Reference Range & Units 05/11/23 06:46   25-Hydroxy   Vitamin D 25 30 - 100 ng/mL 47         Patient's medications, allergies, and social histories were reviewed and updated as appropriate.      ROS:     CONS:     No fever, no chills, no weight loss, no fatigue   EYES:      No diplopia, no blurry vision, no redness of eyes, no swelling of eyelids   ENT:    No hearing loss, No ear pain, No sore throat, no dysphagia, no neck swelling   CV:     No chest pain, no palpitations, no claudication, no orthopnea, no PND   PULM:    No SOB, no cough, no hemoptysis, no wheezing    GI:   No nausea, no vomiting, no diarrhea, no constipation, no bloody stools   :  Passing urine well, no dysuria, no hematuria   ENDO:   No polyuria, no polydipsia, no heat  intolerance, no cold intolerance   NEURO: No headaches, no dizziness, no convulsions, no tremors   MUSC:  No joint swellings, no arthralgias, no myalgias, no weakness   SKIN:   No rash, no ulcers, no dry skin   PSYCH:   No depression, no anxiety, no difficulty sleeping       Past Medical History:  Patient Active Problem List    Diagnosis Date Noted    Dermatitis 03/15/2023    Hypothyroidism, acquired, autoimmune 2019    Annual physical exam 2018    Hashimoto's thyroiditis 09/15/2017    Vitamin D deficiency 09/15/2017       Past Surgical History:  Past Surgical History:   Procedure Laterality Date    PRIMARY C SECTION N/A 2022    Procedure:  SECTION, PRIMARY;  Surgeon: Kota Randolph M.D.;  [facility];  Service: Labor and Delivery        Allergies:  Penicillins     Current Medications:    Current Outpatient Medications:     SYNTHROID 112 MCG Tab, Take 1 Tablet by mouth every morning on an empty stomach., Disp: 90 Tablet, Rfl: 1    Omega-3 Fatty Acids (OMEGA 3 500 PO), Take  by mouth., Disp: , Rfl:     triamcinolone acetonide (KENALOG) 0.1 % Cream, Apply 1 Application. topically 3 times a day., Disp: 80 g, Rfl: 1    liothyronine (CYTOMEL) 5 MCG Tab, Take 1 Tablet by mouth every day., Disp: 90 Tablet, Rfl: 4    Cholecalciferol (VITAMIN D-3) 125 MCG (5000 UT) Tab, Take  by mouth., Disp: , Rfl:     Social History:  Social History     Socioeconomic History    Marital status:      Spouse name: Not on file    Number of children: Not on file    Years of education: Not on file    Highest education level: Bachelor's degree (e.g., BA, AB, BS)   Occupational History    Not on file   Tobacco Use    Smoking status: Never    Smokeless tobacco: Never   Vaping Use    Vaping Use: Never used   Substance and Sexual Activity    Alcohol use: Not Currently     Comment: rarely    Drug use: No    Sexual activity: Yes     Partners: Male     Comment: , two children   Other  Topics Concern    Not on file   Social History Narrative    Not on file     Social Determinants of Health     Financial Resource Strain: Low Risk  (3/12/2023)    Overall Financial Resource Strain (CARDIA)     Difficulty of Paying Living Expenses: Not hard at all   Food Insecurity: No Food Insecurity (3/12/2023)    Hunger Vital Sign     Worried About Running Out of Food in the Last Year: Never true     Ran Out of Food in the Last Year: Never true   Transportation Needs: No Transportation Needs (3/12/2023)    PRAPARE - Transportation     Lack of Transportation (Medical): No     Lack of Transportation (Non-Medical): No   Physical Activity: Insufficiently Active (3/12/2023)    Exercise Vital Sign     Days of Exercise per Week: 3 days     Minutes of Exercise per Session: 30 min   Stress: No Stress Concern Present (3/12/2023)    [employer] [place] - Occupational Stress Questionnaire     Feeling of Stress : Not at all   Social Connections: Unknown (3/12/2023)    Social Connection and Isolation Panel [NHANES]     Frequency of Communication with Friends and Family: More than three times a week     Frequency of Social Gatherings with Friends and Family: Once a week     Attends Yarsanism Services: Patient refused     Active Member of Clubs or Organizations: No     Attends Club or Organization Meetings: Never     Marital Status:    Intimate Partner Violence: Not on file   Housing Stability: Low Risk  (3/12/2023)    Housing Stability Vital Sign     Unable to Pay for Housing in the Last Year: No     Number of Places Lived in the Last Year: 1     Unstable Housing in the Last Year: No        Family History:   Family History   Problem Relation Age of Onset    Cancer Father         Skin    Diabetes Father         Type II    Cancer Maternal Grandfather         Lung    Cancer Sister         Skin and Cervical    Psychiatric Illness Sister         Bipolar    Psychiatric Illness Paternal Aunt         Bipolar     Psychiatric Illness Maternal Aunt         Schizophrenia    Diabetes Maternal Uncle         Type I    Diabetes Maternal Aunt         Type I    Diabetes Maternal Aunt         Type I    Diabetes Paternal Uncle         Type I         PHYSICAL EXAM:   Vital signs:   Vitals:    05/12/23 1506   BP: 122/70   Pulse: (!) (P) 103   SpO2: (P) 95%      GENERAL: Well-developed, well-nourished  in no apparent distress.       Labs:    Lab Results   Component Value Date/Time    TSHULTRASEN 0.040 (L) 06/08/2021 1112     Lab Results   Component Value Date/Time    FREET4 1.27 06/08/2021 1112     Lab Results   Component Value Date/Time    FREET3 2.66 06/08/2021 1112         Imaging:      ASSESSMENT/PLAN:   1. Hypothyroidism, acquired  Clinically stable  Biochemically TSH is mildly suppressed with no signs of overt treatment  We will continue same dose-see HPI  Thyroid ultrasound ordered for evaluation  - TSH; Future  - FREE THYROXINE; Future  - Comp Metabolic Panel; Future  - US-THYROID; Future    2. Hashimoto's disease  Discussed etiology of her hypothyroidism    3. Vitamin D deficiency  Stable  Continue regimen-HPI  - VITAMIN D,25 HYDROXY (DEFICIENCY); Future      Disposition: Follow-up in 6 months  Do your blood work 2 weeks prior to next appointment    Thank you kindly for allowing me to participate in the thyroid care plan for this patient.    ELIZABETH MahmoodRGradyN.  01/06/2023    CC:   Dm Irvin P.A.-C.

## 2023-06-13 ENCOUNTER — APPOINTMENT (OUTPATIENT)
Dept: RADIOLOGY | Facility: MEDICAL CENTER | Age: 33
End: 2023-06-13
Payer: COMMERCIAL

## 2023-09-18 ENCOUNTER — HOSPITAL ENCOUNTER (OUTPATIENT)
Dept: RADIOLOGY | Facility: MEDICAL CENTER | Age: 33
End: 2023-09-18
Payer: COMMERCIAL

## 2023-09-18 DIAGNOSIS — E03.9 HYPOTHYROIDISM, ACQUIRED: ICD-10-CM

## 2023-09-18 PROCEDURE — 76536 US EXAM OF HEAD AND NECK: CPT

## 2023-11-09 ENCOUNTER — HOSPITAL ENCOUNTER (OUTPATIENT)
Dept: LAB | Facility: MEDICAL CENTER | Age: 33
End: 2023-11-09
Payer: COMMERCIAL

## 2023-11-09 DIAGNOSIS — E03.9 HYPOTHYROIDISM, ACQUIRED: ICD-10-CM

## 2023-11-09 DIAGNOSIS — E55.9 VITAMIN D DEFICIENCY: ICD-10-CM

## 2023-11-09 LAB
25(OH)D3 SERPL-MCNC: 60 NG/ML (ref 30–100)
ALBUMIN SERPL BCP-MCNC: 4.5 G/DL (ref 3.2–4.9)
ALBUMIN/GLOB SERPL: 1.5 G/DL
ALP SERPL-CCNC: 69 U/L (ref 30–99)
ALT SERPL-CCNC: 14 U/L (ref 2–50)
ANION GAP SERPL CALC-SCNC: 9 MMOL/L (ref 7–16)
AST SERPL-CCNC: 17 U/L (ref 12–45)
BILIRUB SERPL-MCNC: 0.4 MG/DL (ref 0.1–1.5)
BUN SERPL-MCNC: 11 MG/DL (ref 8–22)
CALCIUM ALBUM COR SERPL-MCNC: 9 MG/DL (ref 8.5–10.5)
CALCIUM SERPL-MCNC: 9.4 MG/DL (ref 8.4–10.2)
CHLORIDE SERPL-SCNC: 106 MMOL/L (ref 96–112)
CO2 SERPL-SCNC: 27 MMOL/L (ref 20–33)
CREAT SERPL-MCNC: 0.87 MG/DL (ref 0.5–1.4)
FASTING STATUS PATIENT QL REPORTED: NORMAL
GFR SERPLBLD CREATININE-BSD FMLA CKD-EPI: 91 ML/MIN/1.73 M 2
GLOBULIN SER CALC-MCNC: 3 G/DL (ref 1.9–3.5)
GLUCOSE SERPL-MCNC: 86 MG/DL (ref 65–99)
POTASSIUM SERPL-SCNC: 4.5 MMOL/L (ref 3.6–5.5)
PROT SERPL-MCNC: 7.5 G/DL (ref 6–8.2)
SODIUM SERPL-SCNC: 142 MMOL/L (ref 135–145)
T4 FREE SERPL-MCNC: 1.21 NG/DL (ref 0.93–1.7)
TSH SERPL DL<=0.005 MIU/L-ACNC: 1.93 UIU/ML (ref 0.38–5.33)

## 2023-11-09 PROCEDURE — 84443 ASSAY THYROID STIM HORMONE: CPT

## 2023-11-09 PROCEDURE — 82306 VITAMIN D 25 HYDROXY: CPT

## 2023-11-09 PROCEDURE — 80053 COMPREHEN METABOLIC PANEL: CPT

## 2023-11-09 PROCEDURE — 84439 ASSAY OF FREE THYROXINE: CPT

## 2023-11-09 PROCEDURE — 36415 COLL VENOUS BLD VENIPUNCTURE: CPT

## 2023-11-15 ENCOUNTER — OFFICE VISIT (OUTPATIENT)
Dept: URGENT CARE | Facility: CLINIC | Age: 33
End: 2023-11-15
Payer: COMMERCIAL

## 2023-11-15 ENCOUNTER — HOSPITAL ENCOUNTER (OUTPATIENT)
Facility: MEDICAL CENTER | Age: 33
End: 2023-11-15
Attending: PHYSICIAN ASSISTANT
Payer: COMMERCIAL

## 2023-11-15 VITALS
HEIGHT: 66 IN | WEIGHT: 161.6 LBS | DIASTOLIC BLOOD PRESSURE: 70 MMHG | SYSTOLIC BLOOD PRESSURE: 104 MMHG | HEART RATE: 86 BPM | TEMPERATURE: 97.6 F | RESPIRATION RATE: 14 BRPM | BODY MASS INDEX: 25.97 KG/M2 | OXYGEN SATURATION: 98 %

## 2023-11-15 DIAGNOSIS — B96.89 BACTERIAL VAGINOSIS: ICD-10-CM

## 2023-11-15 DIAGNOSIS — N76.0 ACUTE VAGINITIS: ICD-10-CM

## 2023-11-15 DIAGNOSIS — N76.0 BACTERIAL VAGINOSIS: ICD-10-CM

## 2023-11-15 DIAGNOSIS — N30.01 ACUTE CYSTITIS WITH HEMATURIA: ICD-10-CM

## 2023-11-15 DIAGNOSIS — N30.01 ACUTE CYSTITIS WITH HEMATURIA: Primary | ICD-10-CM

## 2023-11-15 LAB
APPEARANCE UR: NORMAL
BILIRUB UR STRIP-MCNC: NEGATIVE MG/DL
COLOR UR AUTO: NORMAL
GLUCOSE UR STRIP.AUTO-MCNC: NEGATIVE MG/DL
KETONES UR STRIP.AUTO-MCNC: NEGATIVE MG/DL
LEUKOCYTE ESTERASE UR QL STRIP.AUTO: NORMAL
NITRITE UR QL STRIP.AUTO: NEGATIVE
PH UR STRIP.AUTO: 6 [PH] (ref 5–8)
POCT INT CON NEG: NEGATIVE
POCT INT CON POS: POSITIVE
POCT URINE PREGNANCY TEST: NEGATIVE
PROT UR QL STRIP: NEGATIVE MG/DL
RBC UR QL AUTO: NORMAL
SP GR UR STRIP.AUTO: 1
UROBILINOGEN UR STRIP-MCNC: 0.2 MG/DL

## 2023-11-15 PROCEDURE — 87660 TRICHOMONAS VAGIN DIR PROBE: CPT

## 2023-11-15 PROCEDURE — 87086 URINE CULTURE/COLONY COUNT: CPT

## 2023-11-15 PROCEDURE — 87077 CULTURE AEROBIC IDENTIFY: CPT

## 2023-11-15 PROCEDURE — 81025 URINE PREGNANCY TEST: CPT | Performed by: PHYSICIAN ASSISTANT

## 2023-11-15 PROCEDURE — 81002 URINALYSIS NONAUTO W/O SCOPE: CPT | Performed by: PHYSICIAN ASSISTANT

## 2023-11-15 PROCEDURE — 3074F SYST BP LT 130 MM HG: CPT | Performed by: PHYSICIAN ASSISTANT

## 2023-11-15 PROCEDURE — 87480 CANDIDA DNA DIR PROBE: CPT

## 2023-11-15 PROCEDURE — 87510 GARDNER VAG DNA DIR PROBE: CPT

## 2023-11-15 PROCEDURE — 99214 OFFICE O/P EST MOD 30 MIN: CPT | Performed by: PHYSICIAN ASSISTANT

## 2023-11-15 PROCEDURE — 3078F DIAST BP <80 MM HG: CPT | Performed by: PHYSICIAN ASSISTANT

## 2023-11-15 RX ORDER — PHENAZOPYRIDINE HYDROCHLORIDE 200 MG/1
200 TABLET, FILM COATED ORAL 3 TIMES DAILY PRN
Qty: 6 TABLET | Refills: 0 | Status: SHIPPED | OUTPATIENT
Start: 2023-11-15 | End: 2023-12-04

## 2023-11-15 RX ORDER — NITROFURANTOIN 25; 75 MG/1; MG/1
100 CAPSULE ORAL 2 TIMES DAILY
Qty: 10 CAPSULE | Refills: 0 | Status: SHIPPED | OUTPATIENT
Start: 2023-11-15 | End: 2023-11-20

## 2023-11-15 ASSESSMENT — FIBROSIS 4 INDEX: FIB4 SCORE: 0.48

## 2023-11-15 ASSESSMENT — ENCOUNTER SYMPTOMS
CHILLS: 0
VOMITING: 0
SWEATS: 0
NAUSEA: 0
FLANK PAIN: 0

## 2023-11-15 NOTE — PROGRESS NOTES
Subjective:   Osito Rivera is a 33 y.o. female who presents for UTI (Yeast infection, urgency, creamy /white discharge x satuday  )        Dysuria   This is a new problem. The current episode started in the past 7 days. The problem occurs every urination. The problem has been gradually worsening. The quality of the pain is described as burning. The pain is moderate. There has been no fever. She is Sexually active. There is No history of pyelonephritis. Associated symptoms include a discharge (thick white vaginal d/c), frequency and urgency. Pertinent negatives include no chills, flank pain, hematuria, hesitancy, nausea, sweats or vomiting. Associated symptoms comments: No vaginal itching. She has tried increased fluids for the symptoms. The treatment provided mild relief.     Review of Systems   Constitutional:  Negative for chills.   Gastrointestinal:  Negative for nausea and vomiting.   Genitourinary:  Positive for dysuria, frequency and urgency. Negative for flank pain, hematuria and hesitancy.       PMH:  has a past medical history of Asthma, Hypotension, Pregnant (April 24, 2022), and Thyroid disease.    She has no past medical history of Acute nasopharyngitis, Addisons disease ([facility]), Adrenal disorder ([facility]), Allergy, Anemia, Anesthesia, Anginal syndrome ([facility]), Anxiety, Arrhythmia, Arthritis, Blood clotting disorder ([facility]), Blood transfusion without reported diagnosis, Bowel habit changes, Breath shortness, Bronchitis, Cancer ([facility]), Carcinoma in situ of respiratory system, Cataract, CHF (congestive heart failure) ([facility]), Clotting disorder ([facility]), Congestive heart failure ([facility]), Continuous ambulatory peritoneal dialysis status ([facility]), COPD (chronic obstructive pulmonary disease) ([facility]), Coughing blood, Cushings syndrome ([facility]), Dental disorder, Depression, Diabetes ([facility]), Diabetic neuropathy ([facility]), Dialysis patient ([facility]), Emphysema of lung ([facility]), GERD (gastroesophageal reflux disease), Glaucoma, Goiter,  Gynecological disorder, Head ache, Heart attack (HCC), Heart burn, Heart murmur, Heart valve disease, Hemorrhagic disorder (HCC), Hepatitis A, Hepatitis B, Hepatitis C, Hiatus hernia syndrome, High cholesterol, HIV (human immunodeficiency virus infection) (HCC), Hyperlipidemia, Hypertension, IBD (inflammatory bowel disease), Indigestion, Infectious disease, Jaundice, Kidney disease, Meningitis, Migraine, Muscle disorder, Myocardial infarct (HCC), Osteoporosis, Pacemaker, Pain, Parathyroid disorder (HCC), Pituitary disease (HCC), Pneumonia, Psychiatric problem, Pulmonary emphysema (HCC), Renal disorder, Rheumatic fever, Seizure (HCC), Sickle cell disease (HCC), Sleep apnea, Snoring, Stroke (HCC), Substance abuse (HCC), Tuberculosis, Urinary bladder disorder, Urinary incontinence, or Urinary tract infection.  MEDS:   Current Outpatient Medications:     nitrofurantoin (MACROBID) 100 MG Cap, Take 1 Capsule by mouth 2 times a day for 5 days., Disp: 10 Capsule, Rfl: 0    phenazopyridine (PYRIDIUM) 200 MG Tab, Take 1 Tablet by mouth 3 times a day as needed for Moderate Pain., Disp: 6 Tablet, Rfl: 0    SYNTHROID 112 MCG Tab, Take 1 Tablet by mouth every morning on an empty stomach., Disp: 90 Tablet, Rfl: 1    liothyronine (CYTOMEL) 5 MCG Tab, Take 1 Tablet by mouth every day., Disp: 90 Tablet, Rfl: 4    Cholecalciferol (VITAMIN D-3) 125 MCG (5000 UT) Tab, Take  by mouth., Disp: , Rfl:     Omega-3 Fatty Acids (OMEGA 3 500 PO), Take  by mouth., Disp: , Rfl:   ALLERGIES:   Allergies   Allergen Reactions    Penicillins Hives     SURGHX:   Past Surgical History:   Procedure Laterality Date    PRIMARY C SECTION N/A 2022    Procedure:  SECTION, PRIMARY;  Surgeon: Kota Randolph M.D.;  Location: [facility];  Service: Labor and Delivery     SOCHX:  reports that she has never smoked. She has never used smokeless tobacco. She reports that she does not currently use alcohol. She reports that she does  "not use drugs.  FH: Family history was reviewed, no pertinent findings to report   Objective:   /70 (BP Location: Right arm, Patient Position: Sitting, BP Cuff Size: Adult)   Pulse 86   Temp 36.4 °C (97.6 °F) (Temporal)   Resp 14   Ht 1.676 m (5' 6\")   Wt 73.3 kg (161 lb 9.6 oz)   LMP 11/05/2023 (Exact Date)   SpO2 98%   BMI 26.08 kg/m²   Physical Exam  Vitals reviewed.   Constitutional:       General: She is not in acute distress.     Appearance: Normal appearance. She is well-developed. She is not toxic-appearing.   HENT:      Head: Normocephalic and atraumatic.      Right Ear: External ear normal.      Left Ear: External ear normal.      Nose: Nose normal.   Cardiovascular:      Rate and Rhythm: Normal rate and regular rhythm.      Heart sounds: Normal heart sounds, S1 normal and S2 normal.   Pulmonary:      Effort: Pulmonary effort is normal. No respiratory distress.      Breath sounds: Normal breath sounds. No stridor. No decreased breath sounds, wheezing, rhonchi or rales.   Skin:     General: Skin is dry.   Neurological:      Comments: Alert and oriented.    Psychiatric:         Speech: Speech normal.         Behavior: Behavior normal.               Assessment/Plan:   1. Acute cystitis with hematuria  - URINE CULTURE(NEW); Future  - nitrofurantoin (MACROBID) 100 MG Cap; Take 1 Capsule by mouth 2 times a day for 5 days.  Dispense: 10 Capsule; Refill: 0  - phenazopyridine (PYRIDIUM) 200 MG Tab; Take 1 Tablet by mouth 3 times a day as needed for Moderate Pain.  Dispense: 6 Tablet; Refill: 0    2. vaginal discharge  - POCT Urinalysis  - POCT Pregnancy  - VAGINAL PATHOGENS DNA PANEL; Future    UA positive for RBCs and leuks.UA and PE consistent with acute cystitis. Pyelonephritis unlikely as pt has no flank pain, CVA tenderness, N/V, F/C.     Continue to push fluids.  Pyridium as needed for pain.  Urine culture pending.  I will contact her with culture results via [facility] and adjust treatment plan as " indicated.    If symptoms fail to improve in 48 hours, worsen or you develop fever, flank pain, nausea, vomiting, or other new symptoms return to the clinic immediately or go the ER.    2.  Vaginal pathogen testing pending.  I will contact patient with results via MyChart and adjust treatment plan as indicated.

## 2023-11-16 LAB
CANDIDA DNA VAG QL PROBE+SIG AMP: NEGATIVE
G VAGINALIS DNA VAG QL PROBE+SIG AMP: POSITIVE
T VAGINALIS DNA VAG QL PROBE+SIG AMP: NEGATIVE

## 2023-11-16 RX ORDER — METRONIDAZOLE 500 MG/1
500 TABLET ORAL 2 TIMES DAILY
Qty: 14 TABLET | Refills: 0 | Status: SHIPPED | OUTPATIENT
Start: 2023-11-16 | End: 2023-11-23

## 2023-11-17 ENCOUNTER — OFFICE VISIT (OUTPATIENT)
Dept: ENDOCRINOLOGY | Facility: MEDICAL CENTER | Age: 33
End: 2023-11-17
Payer: COMMERCIAL

## 2023-11-17 VITALS
HEIGHT: 66 IN | DIASTOLIC BLOOD PRESSURE: 66 MMHG | BODY MASS INDEX: 26.53 KG/M2 | SYSTOLIC BLOOD PRESSURE: 124 MMHG | HEART RATE: 105 BPM | OXYGEN SATURATION: 100 % | WEIGHT: 165.1 LBS

## 2023-11-17 DIAGNOSIS — E03.9 HYPOTHYROIDISM, ACQUIRED: ICD-10-CM

## 2023-11-17 DIAGNOSIS — E06.3 HASHIMOTO'S DISEASE: ICD-10-CM

## 2023-11-17 DIAGNOSIS — L85.3 DRY SKIN: ICD-10-CM

## 2023-11-17 DIAGNOSIS — E55.9 VITAMIN D DEFICIENCY: ICD-10-CM

## 2023-11-17 PROCEDURE — 3074F SYST BP LT 130 MM HG: CPT

## 2023-11-17 PROCEDURE — 3078F DIAST BP <80 MM HG: CPT

## 2023-11-17 PROCEDURE — 99214 OFFICE O/P EST MOD 30 MIN: CPT

## 2023-11-17 PROCEDURE — 99212 OFFICE O/P EST SF 10 MIN: CPT

## 2023-11-17 RX ORDER — LEVOTHYROXINE SODIUM 112 MCG
112 TABLET ORAL
Qty: 102 TABLET | Refills: 1 | Status: SHIPPED | OUTPATIENT
Start: 2023-11-17

## 2023-11-17 ASSESSMENT — FIBROSIS 4 INDEX: FIB4 SCORE: 0.48

## 2023-11-17 NOTE — RESULT ENCOUNTER NOTE
Jas Mcneill,    Your testing was positive for bacterial vaginosis. I sent in an antibiotic to your pharmacy to treat this. Feel better soon!    Oscar

## 2023-11-17 NOTE — PROGRESS NOTES
Chief Complaint: Follow-up on the following conditions    HPI:   Osito Rivera is a 33 y.o. female      1.  Hypothyroidism, acquired:  Diagnosed with hypothyroidism since age of 17  She initially was treated with Synthroid, then switched to Tirosint and currently she is taking Synthroid again since Tirosint got too expensive    She is 5 month postpartum  She is currently on Synthroid 112 mcg daily and She is also taking Liothyronine 5 mcg daily around in the am  She reports good compliance and takes her thyroid hormone daily before breakfast. -forgets sometimes but not often   She denies taking any iron, calcium  or antacids.   Denies taking multivitamins  Denies taking biotin     Denies constipation, palpitations, diarrhea, fatigue, brain fogginess  Reports dry skin, floaters to his eyes         Latest Reference Range & Units 11/09/23 07:41   TSH 0.380 - 5.330 uIU/mL 1.930   Free T-4 0.93 - 1.70 ng/dL 1.21      Latest Reference Range & Units 05/11/23 06:48   Glycohemoglobin 4.0 - 5.6 % 5.4      Latest Reference Range & Units 11/09/23 07:41   Bun 8 - 22 mg/dL 11   Creatinine 0.50 - 1.40 mg/dL 0.87   GFR (CKD-EPI) >60 mL/min/1.73 m 2 91     2.  Hashimoto's disease:  Reports elevated antibody levels and etiology of her hypothyroidism    3.  Vitamin D deficiency:  Currently taking 5000 IUs OTC daily vitamin D3   Latest Reference Range & Units 11/09/23 07:41   25-Hydroxy   Vitamin D 25 30 - 100 ng/mL 60         Patient's medications, allergies, and social histories were reviewed and updated as appropriate.      ROS:     CONS:     No fever, no chills, no weight loss, no fatigue   EYES:      No diplopia, no blurry vision, no redness of eyes, no swelling of eyelids   ENT:    No hearing loss, No ear pain, No sore throat, no dysphagia, no neck swelling   CV:     No chest pain, no palpitations, no claudication, no orthopnea, no PND   PULM:    No SOB, no cough, no hemoptysis, no wheezing    GI:   No nausea, no  vomiting, no diarrhea, no constipation, no bloody stools   :  Passing urine well, no dysuria, no hematuria   ENDO:   No polyuria, no polydipsia, no heat intolerance, no cold intolerance   NEURO: No headaches, no dizziness, no convulsions, no tremors   MUSC:  No joint swellings, no arthralgias, no myalgias, no weakness   SKIN:   No rash, no ulcers, no dry skin   PSYCH:   No depression, no anxiety, no difficulty sleeping       Past Medical History:  Patient Active Problem List    Diagnosis Date Noted    Dermatitis 03/15/2023    Hypothyroidism, acquired, autoimmune 2019    Annual physical exam 2018    Hashimoto's thyroiditis 09/15/2017    Vitamin D deficiency 09/15/2017       Past Surgical History:  Past Surgical History:   Procedure Laterality Date    PRIMARY C SECTION N/A 2022    Procedure:  SECTION, PRIMARY;  Surgeon: Kota Randolph M.D.;  [facility];  Service: Labor and Delivery        Allergies:  Penicillins     Current Medications:    Current Outpatient Medications:     metroNIDAZOLE (FLAGYL) 500 MG Tab, Take 1 Tablet by mouth 2 times a day for 7 days., Disp: 14 Tablet, Rfl: 0    nitrofurantoin (MACROBID) 100 MG Cap, Take 1 Capsule by mouth 2 times a day for 5 days., Disp: 10 Capsule, Rfl: 0    phenazopyridine (PYRIDIUM) 200 MG Tab, Take 1 Tablet by mouth 3 times a day as needed for Moderate Pain., Disp: 6 Tablet, Rfl: 0    SYNTHROID 112 MCG Tab, Take 1 Tablet by mouth every morning on an empty stomach., Disp: 90 Tablet, Rfl: 1    Omega-3 Fatty Acids (OMEGA 3 500 PO), Take  by mouth., Disp: , Rfl:     liothyronine (CYTOMEL) 5 MCG Tab, Take 1 Tablet by mouth every day., Disp: 90 Tablet, Rfl: 4    Cholecalciferol (VITAMIN D-3) 125 MCG (5000 UT) Tab, Take  by mouth., Disp: , Rfl:     Social History:  Social History     Socioeconomic History    Marital status:      Spouse name: Not on file    Number of children: Not on file    Years of education: Not on  file    Highest education level: Bachelor's degree (e.g., BA, AB, BS)   Occupational History    Not on file   Tobacco Use    Smoking status: Never    Smokeless tobacco: Never   Vaping Use    Vaping Use: Never used   Substance and Sexual Activity    Alcohol use: Not Currently     Comment: rarely    Drug use: No    Sexual activity: Yes     Partners: Male     Comment: , two children   Other Topics Concern    Not on file   Social History Narrative    Not on file     Social Determinants of Health     Financial Resource Strain: Low Risk  (3/12/2023)    Overall Financial Resource Strain (CARDIA)     Difficulty of Paying Living Expenses: Not hard at all   Food Insecurity: No Food Insecurity (3/12/2023)    Hunger Vital Sign     Worried About Running Out of Food in the Last Year: Never true     Ran Out of Food in the Last Year: Never true   Transportation Needs: No Transportation Needs (3/12/2023)    PRAPARE - Transportation     Lack of Transportation (Medical): No     Lack of Transportation (Non-Medical): No   Physical Activity: Insufficiently Active (3/12/2023)    Exercise Vital Sign     Days of Exercise per Week: 3 days     Minutes of Exercise per Session: 30 min   Stress: No Stress Concern Present (3/12/2023)    [employer] - Occupational Stress Questionnaire     Feeling of Stress : Not at all   Social Connections: Unknown (3/12/2023)    Social Connection and Isolation Panel [NHANES]     Frequency of Communication with Friends and Family: More than three times a week     Frequency of Social Gatherings with Friends and Family: Once a week     Attends Hinduism Services: Patient refused     Active Member of Clubs or Organizations: No     Attends Club or Organization Meetings: Never     Marital Status:    Intimate Partner Violence: Not on file   Housing Stability: Low Risk  (3/12/2023)    Housing Stability Vital Sign     Unable to Pay for Housing in the Last Year: No     Number of  Places Lived in the Last Year: 1     Unstable Housing in the Last Year: No        Family History:   Family History   Problem Relation Age of Onset    Cancer Father         Skin    Diabetes Father         Type II    Cancer Maternal Grandfather         Lung    Cancer Sister         Skin and Cervical    Psychiatric Illness Sister         Bipolar    Psychiatric Illness Paternal Aunt         Bipolar    Psychiatric Illness Maternal Aunt         Schizophrenia    Diabetes Maternal Uncle         Type I    Diabetes Maternal Aunt         Type I    Diabetes Maternal Aunt         Type I    Diabetes Paternal Uncle         Type I         PHYSICAL EXAM:   Vital signs:   Vitals:    11/17/23 1523   BP: 124/66   Pulse: (!) 105   SpO2: 100%        GENERAL: Well-developed, well-nourished  in no apparent distress.       Labs:    Lab Results   Component Value Date/Time    TSHULTRASEN 0.040 (L) 06/08/2021 1112     Lab Results   Component Value Date/Time    FREET4 1.27 06/08/2021 1112     Lab Results   Component Value Date/Time    FREET3 2.66 06/08/2021 1112         Imaging:      ASSESSMENT/PLAN:   1. Hypothyroidism, acquired  - Clinically unstable with reports of dry skin   - Biochemically stable  - Pt will take synthroid 112 mcg 6 days a week and one a week 1 1/2 pills   - TSH; Future  - FREE THYROXINE; Future  - Comp Metabolic Panel; Future  - SYNTHROID 112 MCG Tab; Take 1 Tablet by mouth every morning on an empty stomach.  Dispense: 102 Tablet; Refill: 1    2. Hashimoto's disease  Discussed etiology of her hypothyroidism    3. Vitamin D deficiency  Stable  Continue regimen-HPI  - VITAMIN D,25 HYDROXY (DEFICIENCY); Future      4. Dry skin  Unstable  Coint regimen  Synthroid increased to see if improve her symptoms       Disposition: Follow-up in 6 months  Do your blood work 2 weeks prior to next appointment    Thank you kindly for allowing me to participate in the thyroid care plan for this patient.    Javon Nur,  [facility]  11/17/23      CC:   Dm Irvin P.A.-C.

## 2023-11-18 LAB
BACTERIA UR CULT: NORMAL
SIGNIFICANT IND 70042: NORMAL
SITE SITE: NORMAL
SOURCE SOURCE: NORMAL

## 2023-11-20 NOTE — RESULT ENCOUNTER NOTE
Aaliyah Mcneill,    Your urine culture was negative for abnormal bacterial growth. You may stop the macrobid, if you haven't already completed it. If your symptoms have not resolved please follow-up with your primary care provider or return to clinic for reevaluation.    Kind regards,  Oscar

## 2023-11-21 ENCOUNTER — TELEPHONE (OUTPATIENT)
Dept: ENDOCRINOLOGY | Facility: MEDICAL CENTER | Age: 33
End: 2023-11-21
Payer: COMMERCIAL

## 2023-11-21 NOTE — TELEPHONE ENCOUNTER
[facility] calling to request to remove the ADELAIDA on pt's prescription. They state brand name is requested, they would like to remove the ADELAIDA as [facility] already gives the brand name synthroid.

## 2023-12-04 ENCOUNTER — OFFICE VISIT (OUTPATIENT)
Dept: MEDICAL GROUP | Facility: MEDICAL CENTER | Age: 33
End: 2023-12-04
Payer: COMMERCIAL

## 2023-12-04 VITALS
SYSTOLIC BLOOD PRESSURE: 80 MMHG | DIASTOLIC BLOOD PRESSURE: 56 MMHG | WEIGHT: 162.2 LBS | HEART RATE: 107 BPM | HEIGHT: 66 IN | TEMPERATURE: 97.6 F | OXYGEN SATURATION: 98 % | BODY MASS INDEX: 26.07 KG/M2

## 2023-12-04 DIAGNOSIS — E06.3 HASHIMOTO'S THYROIDITIS: ICD-10-CM

## 2023-12-04 DIAGNOSIS — J01.01 ACUTE RECURRENT MAXILLARY SINUSITIS: ICD-10-CM

## 2023-12-04 PROCEDURE — 3074F SYST BP LT 130 MM HG: CPT | Performed by: PHYSICIAN ASSISTANT

## 2023-12-04 PROCEDURE — 99214 OFFICE O/P EST MOD 30 MIN: CPT | Performed by: PHYSICIAN ASSISTANT

## 2023-12-04 PROCEDURE — 3078F DIAST BP <80 MM HG: CPT | Performed by: PHYSICIAN ASSISTANT

## 2023-12-04 RX ORDER — SULFAMETHOXAZOLE AND TRIMETHOPRIM 800; 160 MG/1; MG/1
1 TABLET ORAL 2 TIMES DAILY
Qty: 14 TABLET | Refills: 0 | Status: SHIPPED | OUTPATIENT
Start: 2023-12-04 | End: 2023-12-11

## 2023-12-04 ASSESSMENT — FIBROSIS 4 INDEX: FIB4 SCORE: 0.48

## 2023-12-05 NOTE — ASSESSMENT & PLAN NOTE
This is a pleasant 33-year-old female complains of a greater than 2-week history of sinus congestion drainage and associated cough.  Complains of burning of her chest.  Complains of right maxillary sinus pain.  Pulse was elevated now but today it has been in the 70s resting.  She denies any fevers.  She recently went to the pharmacy and was told to take Mucinex D instead of her Mucinex DM.  She has Advil that she has been taking.  Tested negative for COVID.  Believes she may have of had RSV.

## 2023-12-05 NOTE — PROGRESS NOTES
Subjective:   Osito Rivera is a 33 y.o. female here today for acute maxillary sinusitis.    Acute recurrent maxillary sinusitis  This is a pleasant 33-year-old female complains of a greater than 2-week history of sinus congestion drainage and associated cough.  Complains of burning of her chest.  Complains of right maxillary sinus pain.  Pulse was elevated now but today it has been in the 70s resting.  She denies any fevers.  She recently went to the pharmacy and was told to take Mucinex D instead of her Mucinex DM.  She has Advil that she has been taking.  Tested negative for COVID.  Believes she may have of had RSV.       Current medicines (including changes today)  Current Outpatient Medications   Medication Sig Dispense Refill    sulfamethoxazole-trimethoprim (BACTRIM DS) 800-160 MG tablet Take 1 Tablet by mouth 2 times a day for 7 days. 14 Tablet 0    SYNTHROID 112 MCG Tab Take 1 Tablet by mouth every morning on an empty stomach. 102 Tablet 1    liothyronine (CYTOMEL) 5 MCG Tab Take 1 Tablet by mouth every day. 90 Tablet 4    Cholecalciferol (VITAMIN D-3) 125 MCG (5000 UT) Tab Take  by mouth.       No current facility-administered medications for this visit.     She  has a past medical history of Asthma, Hypotension, Pregnant (April 24, 2022), and Thyroid disease.    She has no past medical history of Acute nasopharyngitis, Addisons disease ([facility]), Adrenal disorder ([facility]), Allergy, Anemia, Anesthesia, Anginal syndrome ([facility]), Anxiety, Arrhythmia, Arthritis, Blood clotting disorder ([facility]), Blood transfusion without reported diagnosis, Bowel habit changes, Breath shortness, Bronchitis, Cancer ([facility]), Carcinoma in situ of respiratory system, Cataract, CHF (congestive heart failure) ([facility]), Clotting disorder ([facility]), Congestive heart failure ([facility]), Continuous ambulatory peritoneal dialysis status ([facility]), COPD (chronic obstructive pulmonary disease) ([facility]), Coughing blood, Cushings syndrome ([facility]), Dental disorder,  "Depression, Diabetes (HCC), Diabetic neuropathy (HCC), Dialysis patient (HCC), Emphysema of lung (HCC), GERD (gastroesophageal reflux disease), Glaucoma, Goiter, Gynecological disorder, Head ache, Heart attack (HCC), Heart burn, Heart murmur, Heart valve disease, Hemorrhagic disorder (HCC), Hepatitis A, Hepatitis B, Hepatitis C, Hiatus hernia syndrome, High cholesterol, HIV (human immunodeficiency virus infection) (HCC), Hyperlipidemia, Hypertension, IBD (inflammatory bowel disease), Indigestion, Infectious disease, Jaundice, Kidney disease, Meningitis, Migraine, Muscle disorder, Myocardial infarct (HCC), Osteoporosis, Pacemaker, Pain, Parathyroid disorder (HCC), Pituitary disease (HCC), Pneumonia, Psychiatric problem, Pulmonary emphysema (HCC), Renal disorder, Rheumatic fever, Seizure (HCC), Sickle cell disease (HCC), Sleep apnea, Snoring, Stroke (HCC), Substance abuse (HCC), Tuberculosis, Urinary bladder disorder, Urinary incontinence, or Urinary tract infection.    Social History and Family History were reviewed and updated.    ROS   No chest pain, no shortness of breath, no abdominal pain and all other systems were reviewed and are negative.       Objective:     BP (!) 80/56 (BP Location: Right arm, Patient Position: Sitting, BP Cuff Size: Adult)   Pulse (!) 107   Temp 36.4 °C (97.6 °F) (Temporal)   Ht 1.676 m (5' 6\")   Wt 73.6 kg (162 lb 3.2 oz)   SpO2 98%  Body mass index is 26.18 kg/m².   Physical Exam:  Constitutional: Alert, no distress.  Skin: Warm, dry, good turgor, no rashes in visible areas.  Eye: Equal, round and reactive, conjunctiva clear, lids normal.  ENMT: Lips without lesions, good dentition, oropharynx clear.  Right sided maxillary tenderness.  Neck: Trachea midline, no masses.   Lymph: No cervical or supraclavicular lymphadenopathy  Respiratory: Unlabored respiratory effort, lungs clear to auscultation, no wheezes, no ronchi.  Cardiovascular: Normal S1, S2, no murmur, tachycardic.  Psych: " Alert and oriented x3, normal affect and mood.        Assessment and Plan:   The following treatment plan was discussed    1. Acute recurrent maxillary sinusitis  Acute, new onset condition.  Continue over-the-counter Mucinex D.  Continue Advil as needed.  Prescribed Bactrim as directed.  Pulse has been pretty stable today.  Low in the 70s.  Unsure why she is currently tachycardic.  She is well-appearing without any fever.  No respiratory distress.  Advised to monitor her pulse at home.  - sulfamethoxazole-trimethoprim (BACTRIM DS) 800-160 MG tablet; Take 1 Tablet by mouth 2 times a day for 7 days.  Dispense: 14 Tablet; Refill: 0    2. Hashimoto's thyroiditis  Chronic condition.  Stable.  Continue medication directed by endocrinology.  She has labs to be done soon.  Follow-up with Endo.         Followup: Return if symptoms worsen or fail to improve.    Please note that this dictation was created using voice recognition software. I have made every reasonable attempt to correct obvious errors, but I expect that there are errors of grammar and possibly content that I did not discover before finalizing the note.

## 2024-01-11 LAB
25(OH)D3+25(OH)D2 SERPL-MCNC: 59.4 NG/ML (ref 30–100)
ALBUMIN SERPL-MCNC: 4.7 G/DL (ref 3.9–4.9)
ALBUMIN/GLOB SERPL: 1.8 {RATIO} (ref 1.2–2.2)
ALP SERPL-CCNC: 66 IU/L (ref 44–121)
ALT SERPL-CCNC: 15 IU/L (ref 0–32)
AST SERPL-CCNC: 20 IU/L (ref 0–40)
BILIRUB SERPL-MCNC: 0.5 MG/DL (ref 0–1.2)
BUN SERPL-MCNC: 12 MG/DL (ref 6–20)
BUN/CREAT SERPL: 13 (ref 9–23)
CALCIUM SERPL-MCNC: 9.7 MG/DL (ref 8.7–10.2)
CHLORIDE SERPL-SCNC: 102 MMOL/L (ref 96–106)
CO2 SERPL-SCNC: 23 MMOL/L (ref 20–29)
CREAT SERPL-MCNC: 0.92 MG/DL (ref 0.57–1)
EGFRCR SERPLBLD CKD-EPI 2021: 85 ML/MIN/1.73
GLOBULIN SER CALC-MCNC: 2.6 G/DL (ref 1.5–4.5)
GLUCOSE SERPL-MCNC: 93 MG/DL (ref 70–99)
POTASSIUM SERPL-SCNC: 4.2 MMOL/L (ref 3.5–5.2)
PROT SERPL-MCNC: 7.3 G/DL (ref 6–8.5)
SODIUM SERPL-SCNC: 139 MMOL/L (ref 134–144)
T4 FREE SERPL-MCNC: 1.47 NG/DL (ref 0.82–1.77)
TSH SERPL DL<=0.005 MIU/L-ACNC: 0.61 UIU/ML (ref 0.45–4.5)

## 2024-01-19 ENCOUNTER — OFFICE VISIT (OUTPATIENT)
Dept: ENDOCRINOLOGY | Facility: MEDICAL CENTER | Age: 34
End: 2024-01-19
Payer: COMMERCIAL

## 2024-01-19 VITALS
HEIGHT: 66 IN | HEART RATE: 109 BPM | OXYGEN SATURATION: 97 % | SYSTOLIC BLOOD PRESSURE: 96 MMHG | DIASTOLIC BLOOD PRESSURE: 56 MMHG | BODY MASS INDEX: 26.42 KG/M2 | WEIGHT: 164.4 LBS

## 2024-01-19 DIAGNOSIS — E03.9 HYPOTHYROIDISM, ACQUIRED: ICD-10-CM

## 2024-01-19 DIAGNOSIS — E06.3 HASHIMOTO'S DISEASE: ICD-10-CM

## 2024-01-19 DIAGNOSIS — E55.9 VITAMIN D DEFICIENCY: ICD-10-CM

## 2024-01-19 PROCEDURE — 99211 OFF/OP EST MAY X REQ PHY/QHP: CPT

## 2024-01-19 PROCEDURE — 99214 OFFICE O/P EST MOD 30 MIN: CPT

## 2024-01-19 PROCEDURE — 3074F SYST BP LT 130 MM HG: CPT

## 2024-01-19 PROCEDURE — 3078F DIAST BP <80 MM HG: CPT

## 2024-01-19 ASSESSMENT — FIBROSIS 4 INDEX: FIB4 SCORE: 0.55

## 2024-01-19 NOTE — PROGRESS NOTES
Chief Complaint: Follow-up on the following conditions    HPI:   Osito Rivera is a 33 y.o. female      1.  Hypothyroidism, acquired:  Diagnosed with hypothyroidism since age of 17  She initially was treated with Synthroid, then switched to Tirosint and currently she is taking Synthroid again since Tirosint got too expensive    She is 12 month postpartum  She is currently on Synthroid 112 mcg 6 days a week and one and half one day a week and She is also taking Liothyronine 5 mcg daily around in the am  She reports good compliance and takes her thyroid hormone daily before breakfast. -forgets sometimes but not often   She denies taking any iron, calcium  or antacids.   Denies taking multivitamins  Denies taking biotin     Denies palpitations, diarrhea, fatigue, brain fogginess  Reports dry skin,constipation, splitting nails-she is using Cetaphil for her hands,         Latest Reference Range & Units 01/10/24 04:54   TSH 0.450 - 4.500 uIU/mL 0.611   Free T-4 0.82 - 1.77 ng/dL 1.47      Latest Reference Range & Units 05/11/23 06:48   Glycohemoglobin 4.0 - 5.6 % 5.4      Latest Reference Range & Units 01/10/24 04:54   eGFR >59 mL/min/1.73 85     2.  Hashimoto's disease:  Reports elevated antibody levels and etiology of her hypothyroidism    3.  Vitamin D deficiency:  Currently taking 5000 IUs OTC daily vitamin D3   Latest Reference Range & Units 01/10/24 04:54   25-Hydroxy   Vitamin D 25 30.0 - 100.0 ng/mL 59.4         Patient's medications, allergies, and social histories were reviewed and updated as appropriate.      ROS:     CONS:     No fever, no chills, no weight loss, no fatigue   EYES:      No diplopia, no blurry vision, no redness of eyes, no swelling of eyelids   ENT:    No hearing loss, No ear pain, No sore throat, no dysphagia, no neck swelling   CV:     No chest pain, no palpitations, no claudication, no orthopnea, no PND   PULM:    No SOB, no cough, no hemoptysis, no wheezing    GI:   No nausea, no  vomiting, no diarrhea, no constipation, no bloody stools   :  Passing urine well, no dysuria, no hematuria   ENDO:   No polyuria, no polydipsia, no heat intolerance, no cold intolerance   NEURO: No headaches, no dizziness, no convulsions, no tremors   MUSC:  No joint swellings, no arthralgias, no myalgias, no weakness   SKIN:   No rash, no ulcers, no dry skin   PSYCH:   No depression, no anxiety, no difficulty sleeping       Past Medical History:  Patient Active Problem List    Diagnosis Date Noted    Acute recurrent maxillary sinusitis 2023    Dermatitis 03/15/2023    Hypothyroidism, acquired, autoimmune 2019    Annual physical exam 2018    Hashimoto's thyroiditis 09/15/2017    Vitamin D deficiency 09/15/2017       Past Surgical History:  Past Surgical History:   Procedure Laterality Date    PRIMARY C SECTION N/A 2022    Procedure:  SECTION, PRIMARY;  Surgeon: Kota Randolph M.D.;  Location: [facility];  Service: Labor and Delivery        Allergies:  Penicillins     Current Medications:    Current Outpatient Medications:     SYNTHROID 112 MCG Tab, Take 1 Tablet by mouth every morning on an empty stomach., Disp: 102 Tablet, Rfl: 1    liothyronine (CYTOMEL) 5 MCG Tab, Take 1 Tablet by mouth every day., Disp: 90 Tablet, Rfl: 4    Cholecalciferol (VITAMIN D-3) 125 MCG (5000 UT) Tab, Take  by mouth., Disp: , Rfl:     Social History:  Social History     Socioeconomic History    Marital status:      Spouse name: Not on file    Number of children: Not on file    Years of education: Not on file    Highest education level: Bachelor's degree (e.g., BA, AB, BS)   Occupational History    Not on file   Tobacco Use    Smoking status: Never    Smokeless tobacco: Never   Vaping Use    Vaping Use: Never used   Substance and Sexual Activity    Alcohol use: Not Currently     Comment: rarely    Drug use: No    Sexual activity: Yes     Partners: Male     Comment: , two  children   Other Topics Concern    Not on file   Social History Narrative    Not on file     Social Determinants of Health     Financial Resource Strain: Low Risk  (3/12/2023)    Overall Financial Resource Strain (CARDIA)     Difficulty of Paying Living Expenses: Not hard at all   Food Insecurity: No Food Insecurity (3/12/2023)    Hunger Vital Sign     Worried About Running Out of Food in the Last Year: Never true     Ran Out of Food in the Last Year: Never true   Transportation Needs: No Transportation Needs (3/12/2023)    PRAPARE - Transportation     Lack of Transportation (Medical): No     Lack of Transportation (Non-Medical): No   Physical Activity: Insufficiently Active (3/12/2023)    Exercise Vital Sign     Days of Exercise per Week: 3 days     Minutes of Exercise per Session: 30 min   Stress: No Stress Concern Present (3/12/2023)    [employer] - Occupational Stress Questionnaire     Feeling of Stress : Not at all   Social Connections: Unknown (3/12/2023)    Social Connection and Isolation Panel [NHANES]     Frequency of Communication with Friends and Family: More than three times a week     Frequency of Social Gatherings with Friends and Family: Once a week     Attends Latter day Services: Patient refused     Active Member of Clubs or Organizations: No     Attends Club or Organization Meetings: Never     Marital Status:    Intimate Partner Violence: Not on file   Housing Stability: Low Risk  (3/12/2023)    Housing Stability Vital Sign     Unable to Pay for Housing in the Last Year: No     Number of Places Lived in the Last Year: 1     Unstable Housing in the Last Year: No        Family History:   Family History   Problem Relation Age of Onset    Cancer Father         Skin    Diabetes Father         Type II    Cancer Maternal Grandfather         Lung    Cancer Sister         Skin and Cervical    Psychiatric Illness Sister         Bipolar    Psychiatric Illness Paternal Aunt          Bipolar    Psychiatric Illness Maternal Aunt         Schizophrenia    Diabetes Maternal Uncle         Type I    Diabetes Maternal Aunt         Type I    Diabetes Maternal Aunt         Type I    Diabetes Paternal Uncle         Type I         PHYSICAL EXAM:   Vital signs:   Vitals:    01/19/24 0814   BP: 96/56   Pulse: (!) 109   SpO2: 97%          GENERAL: Well-developed, well-nourished  in no apparent distress.       Labs:    Lab Results   Component Value Date/Time    TSHULTRASEN 0.040 (L) 06/08/2021 1112     Lab Results   Component Value Date/Time    FREET4 1.27 06/08/2021 1112     Lab Results   Component Value Date/Time    FREET3 2.66 06/08/2021 1112         Imaging:      ASSESSMENT/PLAN:   1. Hypothyroidism, acquired  - Clinically unstable with reports of dry skin   - Biochemically stable  - Pt will take synthroid 112 mcg 6 days a week and 2 days a week 1 1/2 pills     - TSH; Future  - FREE THYROXINE; Future  - Comp Metabolic Panel; Future    2. Hashimoto's disease  Discussed etiology of her hypothyroidism    3. Vitamin D deficiency  Stable  Continue regimen-HPI  - VITAMIN D,25 HYDROXY (DEFICIENCY); Future      4. Dry skin  Unstable  Coint regimen  Synthroid increased to see if improve her symptoms and Dove for body wash      Disposition: Follow-up in 3 months  Do your blood work 2 weeks prior to next appointment    Thank you kindly for allowing me to participate in the thyroid care plan for this patient.    BENJAMIN [email or web address]  01/19/24          CC:   Dm Irvin P.A.-C.

## 2024-03-30 DIAGNOSIS — E03.9 HYPOTHYROIDISM, ACQUIRED: ICD-10-CM

## 2024-03-31 RX ORDER — LIOTHYRONINE SODIUM 5 UG/1
5 TABLET ORAL DAILY
Qty: 90 TABLET | Refills: 0 | Status: SHIPPED | OUTPATIENT
Start: 2024-03-31

## 2024-04-05 ENCOUNTER — HOSPITAL ENCOUNTER (OUTPATIENT)
Facility: MEDICAL CENTER | Age: 34
End: 2024-04-05
Attending: PHYSICIAN ASSISTANT
Payer: COMMERCIAL

## 2024-04-05 ENCOUNTER — OFFICE VISIT (OUTPATIENT)
Dept: URGENT CARE | Facility: CLINIC | Age: 34
End: 2024-04-05
Payer: COMMERCIAL

## 2024-04-05 VITALS
SYSTOLIC BLOOD PRESSURE: 110 MMHG | HEIGHT: 66 IN | TEMPERATURE: 97.6 F | OXYGEN SATURATION: 96 % | WEIGHT: 155 LBS | DIASTOLIC BLOOD PRESSURE: 78 MMHG | RESPIRATION RATE: 18 BRPM | HEART RATE: 108 BPM | BODY MASS INDEX: 24.91 KG/M2

## 2024-04-05 DIAGNOSIS — N89.8 VAGINAL DISCHARGE: ICD-10-CM

## 2024-04-05 DIAGNOSIS — N76.0 ACUTE VAGINITIS: Primary | ICD-10-CM

## 2024-04-05 LAB
APPEARANCE UR: CLEAR
BILIRUB UR STRIP-MCNC: NORMAL MG/DL
CANDIDA DNA VAG QL PROBE+SIG AMP: NEGATIVE
COLOR UR AUTO: YELLOW
G VAGINALIS DNA VAG QL PROBE+SIG AMP: POSITIVE
GLUCOSE UR STRIP.AUTO-MCNC: NORMAL MG/DL
KETONES UR STRIP.AUTO-MCNC: NORMAL MG/DL
LEUKOCYTE ESTERASE UR QL STRIP.AUTO: NORMAL
NITRITE UR QL STRIP.AUTO: NORMAL
PH UR STRIP.AUTO: 5.5 [PH] (ref 5–8)
POCT INT CON NEG: NEGATIVE
POCT INT CON POS: POSITIVE
POCT URINE PREGNANCY TEST: NEGATIVE
PROT UR QL STRIP: NORMAL MG/DL
RBC UR QL AUTO: NORMAL
SP GR UR STRIP.AUTO: 1.02
T VAGINALIS DNA VAG QL PROBE+SIG AMP: NEGATIVE
UROBILINOGEN UR STRIP-MCNC: 0.2 MG/DL

## 2024-04-05 PROCEDURE — 3078F DIAST BP <80 MM HG: CPT | Performed by: PHYSICIAN ASSISTANT

## 2024-04-05 PROCEDURE — 81002 URINALYSIS NONAUTO W/O SCOPE: CPT | Performed by: PHYSICIAN ASSISTANT

## 2024-04-05 PROCEDURE — 81025 URINE PREGNANCY TEST: CPT | Performed by: PHYSICIAN ASSISTANT

## 2024-04-05 PROCEDURE — 87510 GARDNER VAG DNA DIR PROBE: CPT

## 2024-04-05 PROCEDURE — 3074F SYST BP LT 130 MM HG: CPT | Performed by: PHYSICIAN ASSISTANT

## 2024-04-05 PROCEDURE — 99213 OFFICE O/P EST LOW 20 MIN: CPT | Performed by: PHYSICIAN ASSISTANT

## 2024-04-05 PROCEDURE — 87660 TRICHOMONAS VAGIN DIR PROBE: CPT

## 2024-04-05 PROCEDURE — 87480 CANDIDA DNA DIR PROBE: CPT

## 2024-04-05 RX ORDER — FLUCONAZOLE 150 MG/1
150 TABLET ORAL DAILY
Qty: 1 TABLET | Refills: 0 | Status: SHIPPED | OUTPATIENT
Start: 2024-04-05 | End: 2024-04-19

## 2024-04-05 RX ORDER — METRONIDAZOLE 500 MG/1
500 TABLET ORAL 2 TIMES DAILY
Qty: 14 TABLET | Refills: 0 | Status: SHIPPED | OUTPATIENT
Start: 2024-04-05 | End: 2024-04-12

## 2024-04-05 ASSESSMENT — ENCOUNTER SYMPTOMS
FEVER: 0
SORE THROAT: 0
ABDOMINAL PAIN: 0
VAGINITIS: 1
FLANK PAIN: 0

## 2024-04-05 ASSESSMENT — FIBROSIS 4 INDEX: FIB4 SCORE: 0.57

## 2024-04-05 NOTE — PROGRESS NOTES
Subjective     Osito Rivera is a 34 y.o. female who presents with Vaginal Discharge (X2 days, pasty discharge, and itches. Concerns about possible BV )    Pt PMH, SocHx, SurgHx, FamHx, Drug allergies and medications reviewed with pt/EPIC.      Family history reviewed, it is not pertinent to this complaint.           Patient presents with:  Vaginal Discharge: X2 days, pasty discharge, and itches. Concerns about possible BV which has presented like this in the past.  Patient denies dysuria, lesions, rash, fever or chills.  No concern for STI, patient thinks it is most likely BV.  No over-the-counter treatments.  No other complaints.        Vaginitis  The patient's primary symptoms include genital itching and vaginal discharge. This is a new problem. The current episode started yesterday. The problem occurs intermittently. The problem has been gradually worsening. The pain is mild. The problem affects both sides. She is not pregnant. Pertinent negatives include no abdominal pain, discolored urine, dysuria, fever, flank pain, frequency, hematuria, painful intercourse, sore throat or urgency. The vaginal discharge was thick and white. There has been no bleeding. She has not been passing clots. She has not been passing tissue. The symptoms are aggravated by tactile pressure. She has tried nothing for the symptoms. The treatment provided no relief. She is sexually active. No, her partner does not have an STD. She uses vasectomy for contraception. Her menstrual history has been regular. Her past medical history is significant for vaginosis.       Review of Systems   Constitutional:  Negative for fever.   HENT:  Negative for sore throat.    Gastrointestinal:  Negative for abdominal pain.   Genitourinary:  Positive for vaginal discharge. Negative for dysuria, flank pain, frequency, hematuria and urgency.   All other systems reviewed and are negative.             Objective     /78   Pulse (!) 108   Temp 36.4  "°C (97.6 °F) (Temporal)   Resp 18   Ht 1.676 m (5' 6\")   Wt 70.3 kg (155 lb)   LMP 03/27/2024 (Approximate)   SpO2 96%   Breastfeeding No   BMI 25.02 kg/m²      Physical Exam  Vitals and nursing note reviewed.   Constitutional:       General: She is not in acute distress.     Appearance: Normal appearance. She is well-developed. She is not ill-appearing or toxic-appearing.   HENT:      Head: Normocephalic and atraumatic.      Right Ear: Tympanic membrane normal.      Left Ear: Tympanic membrane normal.      Nose: Nose normal.      Mouth/Throat:      Lips: Pink.      Mouth: Mucous membranes are moist.      Pharynx: Oropharynx is clear. Uvula midline.   Eyes:      Extraocular Movements: Extraocular movements intact.      Conjunctiva/sclera: Conjunctivae normal.      Pupils: Pupils are equal, round, and reactive to light.   Cardiovascular:      Rate and Rhythm: Normal rate and regular rhythm.      Pulses: Normal pulses.      Heart sounds: Normal heart sounds.   Pulmonary:      Effort: Pulmonary effort is normal.      Breath sounds: Normal breath sounds.   Abdominal:      General: Bowel sounds are normal.      Palpations: Abdomen is soft.   Genitourinary:     Comments: Exam deferred, patient self swab.  Musculoskeletal:         General: Normal range of motion.      Cervical back: Normal range of motion and neck supple.   Skin:     General: Skin is warm and dry.      Capillary Refill: Capillary refill takes less than 2 seconds.   Neurological:      General: No focal deficit present.      Mental Status: She is alert and oriented to person, place, and time.      Cranial Nerves: No cranial nerve deficit.      Motor: Motor function is intact.      Coordination: Coordination is intact.      Gait: Gait normal.   Psychiatric:         Mood and Affect: Mood normal.                             Assessment & Plan        1. Acute vaginitis     - POCT Urinalysis  - POCT Pregnancy  - VAGINAL PATHOGENS DNA PANEL  - fluconazole " (DIFLUCAN) 150 MG tablet; Take 1 Tablet by mouth every day.  Dispense: 1 Tablet; Refill: 0  - metroNIDAZOLE (FLAGYL) 500 MG Tab; Take 1 Tablet by mouth 2 times a day for 7 days.  Dispense: 14 Tablet; Refill: 0    2. Vaginal discharge     - POCT Urinalysis  - POCT Pregnancy  - VAGINAL PATHOGENS DNA PANEL  - fluconazole (DIFLUCAN) 150 MG tablet; Take 1 Tablet by mouth every day.  Dispense: 1 Tablet; Refill: 0  - metroNIDAZOLE (FLAGYL) 500 MG Tab; Take 1 Tablet by mouth 2 times a day for 7 days.  Dispense: 14 Tablet; Refill: 0       UA: neg    Preg: neg       Patient HPI and physical exam consistent with acute vaginitis.  Patient has completed a self swab in clinic, results will be available later today.  Patient has had this in the past so we discussed treatment options.  I have sent a prescription for Flagyl as well as Diflucan for patient to  based on test results.  If both tests are negative, patient should not begin either prescription medication.  Patient verbalized understanding and agreement this treatment plan.    Differential diagnosis, supportive care, and indications for immediate follow-up discussed with patient.  Instructed to return to clinic or nearest emergency department for any change in condition, further concerns, or worsening of symptoms.    I personally reviewed prior external notes and test results pertinent to today's visit.  I have independently reviewed and interpreted all diagnostics ordered during this urgent care visit.    PT should follow up with PCP in 1-2 days for re-evaluation if symptoms have not improved.      Discussed red flags and reasons to return to UC or ED.      Pt and/or family verbalized understanding of diagnosis and follow up instructions and was offered informational handout on diagnosis.  PT discharged.     Please note that this dictation was created using voice recognition software. I have made every reasonable attempt to correct obvious errors, but I expect that  there may be errors of grammar and possibly content that I did not discover before finalizing the note.

## 2024-04-16 LAB
25(OH)D3+25(OH)D2 SERPL-MCNC: 72.3 NG/ML (ref 30–100)
ALBUMIN SERPL-MCNC: 4.7 G/DL (ref 3.9–4.9)
ALBUMIN/GLOB SERPL: 2.4 {RATIO} (ref 1.2–2.2)
ALP SERPL-CCNC: 48 IU/L (ref 44–121)
ALT SERPL-CCNC: 26 IU/L (ref 0–32)
AST SERPL-CCNC: 26 IU/L (ref 0–40)
BILIRUB SERPL-MCNC: 0.5 MG/DL (ref 0–1.2)
BUN SERPL-MCNC: 15 MG/DL (ref 6–20)
BUN/CREAT SERPL: 16 (ref 9–23)
CALCIUM SERPL-MCNC: 9.1 MG/DL (ref 8.7–10.2)
CHLORIDE SERPL-SCNC: 104 MMOL/L (ref 96–106)
CO2 SERPL-SCNC: 23 MMOL/L (ref 20–29)
CREAT SERPL-MCNC: 0.95 MG/DL (ref 0.57–1)
EGFRCR SERPLBLD CKD-EPI 2021: 82 ML/MIN/1.73
GLOBULIN SER CALC-MCNC: 2 G/DL (ref 1.5–4.5)
GLUCOSE SERPL-MCNC: 84 MG/DL (ref 70–99)
POTASSIUM SERPL-SCNC: 3.8 MMOL/L (ref 3.5–5.2)
PROT SERPL-MCNC: 6.7 G/DL (ref 6–8.5)
SODIUM SERPL-SCNC: 140 MMOL/L (ref 134–144)
T4 FREE SERPL-MCNC: 1.7 NG/DL (ref 0.82–1.77)
TSH SERPL DL<=0.005 MIU/L-ACNC: 0.17 UIU/ML (ref 0.45–4.5)

## 2024-04-19 ENCOUNTER — HOSPITAL ENCOUNTER (OUTPATIENT)
Facility: MEDICAL CENTER | Age: 34
End: 2024-04-19
Attending: FAMILY MEDICINE
Payer: COMMERCIAL

## 2024-04-19 ENCOUNTER — OFFICE VISIT (OUTPATIENT)
Dept: URGENT CARE | Facility: CLINIC | Age: 34
End: 2024-04-19
Payer: COMMERCIAL

## 2024-04-19 ENCOUNTER — TELEPHONE (OUTPATIENT)
Dept: MEDICAL GROUP | Facility: MEDICAL CENTER | Age: 34
End: 2024-04-19

## 2024-04-19 ENCOUNTER — OFFICE VISIT (OUTPATIENT)
Dept: ENDOCRINOLOGY | Facility: MEDICAL CENTER | Age: 34
End: 2024-04-19
Payer: COMMERCIAL

## 2024-04-19 VITALS
BODY MASS INDEX: 26.03 KG/M2 | HEIGHT: 66 IN | DIASTOLIC BLOOD PRESSURE: 68 MMHG | WEIGHT: 162 LBS | SYSTOLIC BLOOD PRESSURE: 108 MMHG | OXYGEN SATURATION: 96 % | HEART RATE: 96 BPM

## 2024-04-19 VITALS
BODY MASS INDEX: 24.91 KG/M2 | OXYGEN SATURATION: 95 % | WEIGHT: 155 LBS | RESPIRATION RATE: 20 BRPM | HEART RATE: 84 BPM | TEMPERATURE: 97.5 F | SYSTOLIC BLOOD PRESSURE: 120 MMHG | HEIGHT: 66 IN | DIASTOLIC BLOOD PRESSURE: 62 MMHG

## 2024-04-19 DIAGNOSIS — E55.9 VITAMIN D DEFICIENCY: ICD-10-CM

## 2024-04-19 DIAGNOSIS — E06.3 HYPOTHYROIDISM, ACQUIRED, AUTOIMMUNE: ICD-10-CM

## 2024-04-19 DIAGNOSIS — E06.3 HASHIMOTO'S THYROIDITIS: ICD-10-CM

## 2024-04-19 DIAGNOSIS — B96.89 BACTERIAL VAGINOSIS: ICD-10-CM

## 2024-04-19 DIAGNOSIS — N76.0 BACTERIAL VAGINOSIS: ICD-10-CM

## 2024-04-19 DIAGNOSIS — E03.9 HYPOTHYROIDISM, ACQUIRED: ICD-10-CM

## 2024-04-19 DIAGNOSIS — E06.3 HASHIMOTO'S DISEASE: ICD-10-CM

## 2024-04-19 LAB
AMBIGUOUS DTTM AMBI4: NORMAL
APPEARANCE UR: CLEAR
BILIRUB UR STRIP-MCNC: NEGATIVE MG/DL
COLOR UR AUTO: YELLOW
GLUCOSE UR STRIP.AUTO-MCNC: NEGATIVE MG/DL
KETONES UR STRIP.AUTO-MCNC: NEGATIVE MG/DL
LEUKOCYTE ESTERASE UR QL STRIP.AUTO: NORMAL
NITRITE UR QL STRIP.AUTO: NEGATIVE
PH UR STRIP.AUTO: 5.5 [PH] (ref 5–8)
PROT UR QL STRIP: NORMAL MG/DL
RBC UR QL AUTO: NORMAL
SP GR UR STRIP.AUTO: 1.02
UROBILINOGEN UR STRIP-MCNC: 0.2 MG/DL

## 2024-04-19 PROCEDURE — 87480 CANDIDA DNA DIR PROBE: CPT

## 2024-04-19 PROCEDURE — 3074F SYST BP LT 130 MM HG: CPT | Performed by: FAMILY MEDICINE

## 2024-04-19 PROCEDURE — 99213 OFFICE O/P EST LOW 20 MIN: CPT | Performed by: FAMILY MEDICINE

## 2024-04-19 PROCEDURE — 3078F DIAST BP <80 MM HG: CPT | Performed by: FAMILY MEDICINE

## 2024-04-19 PROCEDURE — 87660 TRICHOMONAS VAGIN DIR PROBE: CPT

## 2024-04-19 PROCEDURE — 99211 OFF/OP EST MAY X REQ PHY/QHP: CPT

## 2024-04-19 PROCEDURE — 81002 URINALYSIS NONAUTO W/O SCOPE: CPT | Performed by: FAMILY MEDICINE

## 2024-04-19 PROCEDURE — 3074F SYST BP LT 130 MM HG: CPT

## 2024-04-19 PROCEDURE — 99214 OFFICE O/P EST MOD 30 MIN: CPT

## 2024-04-19 PROCEDURE — 87510 GARDNER VAG DNA DIR PROBE: CPT

## 2024-04-19 PROCEDURE — 3078F DIAST BP <80 MM HG: CPT

## 2024-04-19 RX ORDER — CLINDAMYCIN HYDROCHLORIDE 300 MG/1
300 CAPSULE ORAL 2 TIMES DAILY
Qty: 14 CAPSULE | Refills: 0 | Status: SHIPPED | OUTPATIENT
Start: 2024-04-19 | End: 2024-04-26

## 2024-04-19 ASSESSMENT — ENCOUNTER SYMPTOMS: FEVER: 0

## 2024-04-19 ASSESSMENT — FIBROSIS 4 INDEX
FIB4 SCORE: 0.56
FIB4 SCORE: 0.56

## 2024-04-19 NOTE — TELEPHONE ENCOUNTER
Caller Name: Osito  Call Back Number: [phone]    How would the patient prefer to be contacted with a response: Phone call OK to leave a detailed message    2. SPECIFIC Action To Be Taken: Referral pending, please sign.    3. Diagnosis/Clinical Reason for Request: pt's current endocrinologist is leaving.    4. Specialty & Provider Name/Lab/Imaging Location: Patricio Barajas MD, MPH - [facility]    5. Is appointment scheduled for requested order/referral: no    Patient was informed they will receive a return phone call from the office ONLY if there are any questions before processing their request. Advised to call back if they haven't received a call from the referral department in 5 days.

## 2024-04-19 NOTE — PROGRESS NOTES
Subjective:     Osito Rivera is a 34 y.o. female who presents for UTI (Possible BV, finished all 7 days antibiotics wanting to double check )    HPI  Pt presents for evaluation of an acute problem  Patient recently had bacterial vaginosis and was treated with metronidazole p.o.  Patient tolerated medication well and felt that things were getting better, however symptoms started to recur the past 2 days  Having vaginal discharge again  No new fevers    Review of Systems   Constitutional:  Negative for fever.     PMH:  has a past medical history of Asthma, Hypotension, Pregnant (April 24, 2022), and Thyroid disease.    She has no past medical history of Acute nasopharyngitis, Addisons disease ([facility]), Adrenal disorder ([facility]), Allergy, Anemia, Anesthesia, Anginal syndrome ([facility]), Anxiety, Arrhythmia, Arthritis, Blood clotting disorder ([facility]), Bowel habit changes, Breath shortness, Bronchitis, Cancer ([facility]), Carcinoma in situ of respiratory system, Cataract, CHF (congestive heart failure) ([facility]), Clotting disorder ([facility]), Congestive heart failure ([facility]), Continuous ambulatory peritoneal dialysis status ([facility]), COPD (chronic obstructive pulmonary disease) ([facility]), Coughing blood, Cushings syndrome ([facility]), Dental disorder, Depression, Diabetes ([facility]), Diabetic neuropathy ([facility]), Dialysis patient ([facility]), Emphysema of lung ([facility]), GERD (gastroesophageal reflux disease), Glaucoma, Goiter, Gynecological disorder, Head ache, Heart attack ([facility]), Heart burn, Heart murmur, Heart valve disease, Hemorrhagic disorder ([facility]), Hepatitis A, Hepatitis B, Hepatitis C, Hiatus hernia syndrome, High cholesterol, HIV (human immunodeficiency virus infection) ([facility]), Hyperlipidemia, Hypertension, IBD (inflammatory bowel disease), Indigestion, Infectious disease, Jaundice, Kidney disease, Meningitis, Migraine, Muscle disorder, Myocardial infarct ([facility]), Osteoporosis, Pacemaker, Pain, Parathyroid disorder ([facility]), Pituitary disease ([facility]), Pneumonia,  "Psychiatric problem, Pulmonary emphysema ([facility]), Renal disorder, Rheumatic fever, Seizure ([facility]), Sickle cell disease ([facility]), Sleep apnea, Snoring, Stroke ([facility]), Substance abuse ([facility]), Tuberculosis, Urinary bladder disorder, Urinary incontinence, or Urinary tract infection.  MEDS:   Current Outpatient Medications:     clindamycin (CLEOCIN) 300 MG Cap, Take 1 Capsule by mouth 2 times a day for 7 days., Disp: 14 Capsule, Rfl: 0    liothyronine (CYTOMEL) 5 MCG Tab, TAKE 1 TABLET DAILY, Disp: 90 Tablet, Rfl: 0    SYNTHROID 112 MCG Tab, Take 1 Tablet by mouth every morning on an empty stomach., Disp: 102 Tablet, Rfl: 1    Cholecalciferol (VITAMIN D-3) 125 MCG (5000 UT) Tab, Take  by mouth., Disp: , Rfl:   ALLERGIES:   Allergies   Allergen Reactions    Penicillins Hives     SURGHX:   Past Surgical History:   Procedure Laterality Date    PRIMARY C SECTION N/A 2022    Procedure:  SECTION, PRIMARY;  Surgeon: Kota Randolph M.D.;  Location: [facility];  Service: Labor and Delivery     SOCHX:  reports that she has never smoked. She has never used smokeless tobacco. She reports current alcohol use. She reports that she does not use drugs.     Objective:   /62   Pulse 84   Temp 36.4 °C (97.5 °F) (Temporal)   Resp 20   Ht 1.676 m (5' 6\")   Wt 70.3 kg (155 lb)   LMP 2024 (Approximate)   SpO2 95%   BMI 25.02 kg/m²     Physical Exam  Constitutional:       General: She is not in acute distress.     Appearance: She is well-developed. She is not diaphoretic.   Pulmonary:      Effort: Pulmonary effort is normal.   Neurological:      Mental Status: She is alert.         Assessment/Plan:   Assessment    1. Bacterial vaginosis  - clindamycin (CLEOCIN) 300 MG Cap; Take 1 Capsule by mouth 2 times a day for 7 days.  Dispense: 14 Capsule; Refill: 0  - VAGINAL PATHOGENS DNA PANEL; Future    Patient with bacterial vaginosis and recently treated with metronidazole p.o.  Symptoms have now recurred " fairly shortly after discontinuing medication and will empirically start second course of antibiotic.  Send repeat testing to ensure that she is still testing positive.  Reviewed importance of taking a probiotic to prevent C. difficile.  Follow-up in urgent care as needed.

## 2024-04-19 NOTE — PROGRESS NOTES
Chief Complaint: Follow-up on the following conditions    HPI:   Osito Rivera is a 34 y.o. female      1.  Hypothyroidism, acquired:  Diagnosed with hypothyroidism since age of 17  She initially was treated with Synthroid, then switched to Tirosint and currently she is taking Synthroid again since Tirosint got too expensive    She is 12 month postpartum  She is currently on Synthroid 112 mcg 6 days a week and two one day a week and She is also taking Liothyronine 5 mcg daily around in the am- PT IS ONLY doing one half one day a week   She reports good compliance and takes her thyroid hormone daily before breakfast. -forgets sometimes but not often   She denies taking any iron, calcium  or antacids.   Denies taking multivitamins  Denies taking biotin     Denies palpitations, diarrhea, fatigue, brain fogginess, dry skin, constipation, fatigue        Latest Reference Range & Units 04/15/24 04:52   TSH 0.450 - 4.500 uIU/mL 0.170 (L)   Free T-4 0.82 - 1.77 ng/dL 1.70      Latest Reference Range & Units 05/11/23 06:48   Glycohemoglobin 4.0 - 5.6 % 5.4      Latest Reference Range & Units 01/10/24 04:54   eGFR >59 mL/min/1.73 85     2.  Hashimoto's disease:  Reports elevated antibody levels and etiology of her hypothyroidism    3.  Vitamin D deficiency:  Currently taking 5000 IUs OTC daily vitamin D3   Latest Reference Range & Units 04/15/24 04:52   25-Hydroxy   Vitamin D 25 30.0 - 100.0 ng/mL 72.3         Patient's medications, allergies, and social histories were reviewed and updated as appropriate.  ROS:     CONS:     No fever, no chills, no weight loss, no fatigue   EYES:      No diplopia, no blurry vision, no redness of eyes, no swelling of eyelids   ENT:    No hearing loss, No ear pain, No sore throat, no dysphagia, no neck swelling   CV:     No chest pain, no palpitations, no claudication, no orthopnea, no PND   PULM:    No SOB, no cough, no hemoptysis, no wheezing    GI:   No nausea, no vomiting, no  diarrhea, no constipation, no bloody stools   :  Passing urine well, no dysuria, no hematuria   ENDO:   No polyuria, no polydipsia, no heat intolerance, no cold intolerance   NEURO: No headaches, no dizziness, no convulsions, no tremors   MUSC:  No joint swellings, no arthralgias, no myalgias, no weakness   SKIN:   No rash, no ulcers, no dry skin   PSYCH:   No depression, no anxiety, no difficulty sleeping       Past Medical History:  Patient Active Problem List    Diagnosis Date Noted    Acute recurrent maxillary sinusitis 2023    Dermatitis 03/15/2023    Hypothyroidism, acquired, autoimmune 2019    Annual physical exam 2018    Hashimoto's thyroiditis 09/15/2017    Vitamin D deficiency 09/15/2017       Past Surgical History:  Past Surgical History:   Procedure Laterality Date    PRIMARY C SECTION N/A 2022    Procedure:  SECTION, PRIMARY;  Surgeon: Kota Randolph M.D.;  Location: [facility];  Service: Labor and Delivery        Allergies:  Penicillins     Current Medications:    Current Outpatient Medications:     clindamycin (CLEOCIN) 300 MG Cap, Take 1 Capsule by mouth 2 times a day for 7 days., Disp: 14 Capsule, Rfl: 0    liothyronine (CYTOMEL) 5 MCG Tab, TAKE 1 TABLET DAILY, Disp: 90 Tablet, Rfl: 0    SYNTHROID 112 MCG Tab, Take 1 Tablet by mouth every morning on an empty stomach., Disp: 102 Tablet, Rfl: 1    Cholecalciferol (VITAMIN D-3) 125 MCG (5000 UT) Tab, Take  by mouth., Disp: , Rfl:     Social History:  Social History     Socioeconomic History    Marital status:      Spouse name: Not on file    Number of children: Not on file    Years of education: Not on file    Highest education level: Bachelor's degree (e.g., BA, AB, BS)   Occupational History    Not on file   Tobacco Use    Smoking status: Never    Smokeless tobacco: Never   Vaping Use    Vaping Use: Never used   Substance and Sexual Activity    Alcohol use: Yes     Comment: rarely    Drug  use: No    Sexual activity: Yes     Partners: Male     Comment: , two children   Other Topics Concern    Not on file   Social History Narrative    Not on file     Social Determinants of Health     Financial Resource Strain: Low Risk  (3/12/2023)    Overall Financial Resource Strain (CARDIA)     Difficulty of Paying Living Expenses: Not hard at all   Food Insecurity: No Food Insecurity (3/12/2023)    Hunger Vital Sign     Worried About Running Out of Food in the Last Year: Never true     Ran Out of Food in the Last Year: Never true   Transportation Needs: No Transportation Needs (3/12/2023)    PRAPARE - Transportation     Lack of Transportation (Medical): No     Lack of Transportation (Non-Medical): No   Physical Activity: Insufficiently Active (3/12/2023)    Exercise Vital Sign     Days of Exercise per Week: 3 days     Minutes of Exercise per Session: 30 min   Stress: No Stress Concern Present (3/12/2023)    [place] [place] [employer] - Occupational Stress Questionnaire     Feeling of Stress : Not at all   Social Connections: Unknown (3/12/2023)    Social Connection and Isolation Panel [NHANES]     Frequency of Communication with Friends and Family: More than three times a week     Frequency of Social Gatherings with Friends and Family: Once a week     Attends [employer] Services: Patient declined     Active Member of Clubs or Organizations: No     Attends Club or Organization Meetings: Never     Marital Status:    Intimate Partner Violence: Not on file   Housing Stability: Low Risk  (3/12/2023)    Housing Stability Vital Sign     Unable to Pay for Housing in the Last Year: No     Number of Places Lived in the Last Year: 1     Unstable Housing in the Last Year: No        Family History:   Family History   Problem Relation Age of Onset    Cancer Father         Skin    Diabetes Father         Type II    Cancer Maternal Grandfather         Lung    Cancer Sister         Skin and Cervical     Psychiatric Illness Sister         Bipolar    Psychiatric Illness Paternal Aunt         Bipolar    Psychiatric Illness Maternal Aunt         Schizophrenia    Diabetes Maternal Uncle         Type I    Diabetes Maternal Aunt         Type I    Diabetes Maternal Aunt         Type I    Diabetes Paternal Uncle         Type I         PHYSICAL EXAM:   Vital signs:   Vitals:    04/19/24 1358   BP: 108/68   Pulse: 96   SpO2: 96%            GENERAL: Well-developed, well-nourished  in no apparent distress.       Labs:    Lab Results   Component Value Date/Time    TSHULTRASEN 0.040 (L) 06/08/2021 1112     Lab Results   Component Value Date/Time    FREET4 1.27 06/08/2021 1112     Lab Results   Component Value Date/Time    FREET3 2.66 06/08/2021 1112         Imaging:      ASSESSMENT/PLAN:   1. Hypothyroidism, acquired  - Clinically unstable with reports of dry skin   - Biochemically stable  - Pt will take synthroid 112 mcg 6 days a week and 2 days a week one pill      - TSH; Future  - FREE THYROXINE; Future  - Comp Metabolic Panel; Future    2. Hashimoto's disease  Discussed etiology of her hypothyroidism    3. Vitamin D deficiency  Stable  Continue regimen-HPI  - VITAMIN D,25 HYDROXY (DEFICIENCY); Future     4. Dry skin  Unstable  Coint regimen  Synthroid increased to see if improve her symptoms and Dove for body wash      Disposition: Follow-up in 3 months  Do your blood work 2 weeks prior to next appointment    Thank you kindly for allowing me to participate in the thyroid care plan for this patient.    CHINMAY Mahmood  04/19/24            CC:   Dm Irvin P.A.-C.

## 2024-06-18 LAB
25(OH)D3+25(OH)D2 SERPL-MCNC: 100 NG/ML (ref 30–100)
ALBUMIN SERPL-MCNC: 4.3 G/DL (ref 3.9–4.9)
ALP SERPL-CCNC: 60 IU/L (ref 44–121)
ALT SERPL-CCNC: 14 IU/L (ref 0–32)
AST SERPL-CCNC: 18 IU/L (ref 0–40)
BILIRUB SERPL-MCNC: 0.6 MG/DL (ref 0–1.2)
BUN SERPL-MCNC: 13 MG/DL (ref 6–20)
BUN/CREAT SERPL: 15 (ref 9–23)
CALCIUM SERPL-MCNC: 9.2 MG/DL (ref 8.7–10.2)
CHLORIDE SERPL-SCNC: 103 MMOL/L (ref 96–106)
CO2 SERPL-SCNC: 23 MMOL/L (ref 20–29)
CREAT SERPL-MCNC: 0.87 MG/DL (ref 0.57–1)
EGFRCR SERPLBLD CKD-EPI 2021: 91 ML/MIN/1.73
GLOBULIN SER CALC-MCNC: 2.6 G/DL (ref 1.5–4.5)
GLUCOSE SERPL-MCNC: 83 MG/DL (ref 70–99)
POTASSIUM SERPL-SCNC: 4.1 MMOL/L (ref 3.5–5.2)
PROT SERPL-MCNC: 6.9 G/DL (ref 6–8.5)
SODIUM SERPL-SCNC: 136 MMOL/L (ref 134–144)
T4 FREE SERPL-MCNC: 1.41 NG/DL (ref 0.82–1.77)
TSH SERPL DL<=0.005 MIU/L-ACNC: 0.84 UIU/ML (ref 0.45–4.5)

## 2025-02-14 ENCOUNTER — OFFICE VISIT (OUTPATIENT)
Dept: MEDICAL GROUP | Facility: MEDICAL CENTER | Age: 35
End: 2025-02-14
Payer: COMMERCIAL

## 2025-02-14 VITALS
TEMPERATURE: 98.7 F | BODY MASS INDEX: 23.85 KG/M2 | HEIGHT: 66 IN | HEART RATE: 96 BPM | OXYGEN SATURATION: 96 % | WEIGHT: 148.4 LBS | SYSTOLIC BLOOD PRESSURE: 96 MMHG | DIASTOLIC BLOOD PRESSURE: 56 MMHG

## 2025-02-14 DIAGNOSIS — J06.9 ACUTE URI: ICD-10-CM

## 2025-02-14 PROCEDURE — 3074F SYST BP LT 130 MM HG: CPT | Performed by: PHYSICIAN ASSISTANT

## 2025-02-14 PROCEDURE — 3078F DIAST BP <80 MM HG: CPT | Performed by: PHYSICIAN ASSISTANT

## 2025-02-14 PROCEDURE — 99213 OFFICE O/P EST LOW 20 MIN: CPT | Performed by: PHYSICIAN ASSISTANT

## 2025-02-14 ASSESSMENT — FIBROSIS 4 INDEX: FIB4 SCORE: 0.53

## 2025-02-14 ASSESSMENT — PATIENT HEALTH QUESTIONNAIRE - PHQ9: CLINICAL INTERPRETATION OF PHQ2 SCORE: 0

## 2025-02-14 NOTE — PROGRESS NOTES
Subjective:     History of Present Illness  The patient presents for evaluation of a persistent dry cough.    She reports a recent illness approximately 3.5 weeks ago, which she suspects may have been influenza due to its prevalence at her , although no formal testing was conducted. She has been experiencing a persistent dry cough for several weeks, occasionally producing mucus. She also reports a history of an acute sinus infection, which appears to be resolving as evidenced by the transition of her mucus to a clear but thick consistency. Her condition seems to be improving, but her  has expressed concern about potential pneumonia or bronchitis. She does not experience any chest pain but notes a slight wheezing sound upon full exhalation. She also mentions occasional coughing fits. She has a past medical history of asthma but has not required an inhaler for the past decade. Her current treatment regimen includes over-the-counter Mucinex (1200 mg guaifenesin) and as-needed Motrin and Tylenol for inflammation management.    She is due for a Pap smear, with her last one performed during her pregnancy over 3.5 years ago.      Current medicines (including changes today)  Current Outpatient Medications   Medication Sig Dispense Refill    liothyronine (CYTOMEL) 5 MCG Tab TAKE 1 TABLET DAILY 90 Tablet 0    SYNTHROID 112 MCG Tab Take 1 Tablet by mouth every morning on an empty stomach. 102 Tablet 1    Cholecalciferol (VITAMIN D-3) 125 MCG (5000 UT) Tab Take  by mouth.       No current facility-administered medications for this visit.     She  has a past medical history of Asthma, Hypotension, Pregnant (April 24, 2022), and Thyroid disease.    She has no past medical history of Acute nasopharyngitis, Addisons disease ([facility]), Adrenal disorder ([facility]), Allergy, Anemia, Anesthesia, Anginal syndrome ([facility]), Anxiety, Arrhythmia, Arthritis, Blood clotting disorder ([facility]), Bowel habit changes, Breath shortness, Bronchitis,  "Cancer (HCC), Carcinoma in situ of respiratory system, Cataract, CHF (congestive heart failure) (HCC), Clotting disorder (HCC), Congestive heart failure (HCC), Continuous ambulatory peritoneal dialysis status (HCC), COPD (chronic obstructive pulmonary disease) (HCC), Coughing blood, Cushings syndrome (HCC), Dental disorder, Depression, Diabetes (HCC), Diabetic neuropathy (HCC), Dialysis patient (HCC), Emphysema of lung (HCC), GERD (gastroesophageal reflux disease), Glaucoma, Goiter, Gynecological disorder, Head ache, Heart attack (HCC), Heart burn, Heart murmur, Heart valve disease, Hemorrhagic disorder (HCC), Hepatitis A, Hepatitis B, Hepatitis C, Hiatus hernia syndrome, High cholesterol, HIV (human immunodeficiency virus infection) (HCC), Hyperlipidemia, Hypertension, IBD (inflammatory bowel disease), Indigestion, Infectious disease, Jaundice, Kidney disease, Meningitis, Migraine, Muscle disorder, Myocardial infarct (HCC), Osteoporosis, Pacemaker, Pain, Parathyroid disorder (HCC), Pituitary disease (HCC), Pneumonia, Psychiatric problem, Pulmonary emphysema (HCC), Renal disorder, Rheumatic fever, Seizure (HCC), Sickle cell disease (HCC), Sleep apnea, Snoring, Stroke (HCC), Substance abuse (HCC), Tuberculosis, Urinary bladder disorder, Urinary incontinence, or Urinary tract infection.    ROS   No chest pain, no shortness of breath, no abdominal pain  Positive ROS as per HPI.  All other systems reviewed and are negative.     Objective:     BP 96/56 (BP Location: Left arm, Patient Position: Sitting, BP Cuff Size: Adult)   Pulse 96   Temp 37.1 °C (98.7 °F) (Temporal)   Ht 1.676 m (5' 6\")   Wt 67.3 kg (148 lb 6.4 oz)   SpO2 96%  Body mass index is 23.95 kg/m².   Physical Exam  Lungs are clear. No wheezing detected.  Constitutional: Alert, no distress.  Skin: Warm, dry, good turgor, no rashes in visible areas.  Eye: Equal, round and reactive, conjunctiva clear, lids normal.  ENMT: Lips without lesions, good " dentition, oropharynx clear.  Neck: Trachea midline, no masses, no thyromegaly.   Respiratory: Unlabored respiratory effort, lungs clear to auscultation, no wheezes, no ronchi.  Psych: Alert and oriented x3, normal affect and mood.      Results          Assessment and Plan:   The following treatment plan was discussed    Assessment & Plan  1. Persistent dry cough.  Acute likely post-URI process. Her lungs are clear upon examination, with no evidence of wheezing. The current use of Mucinex (1200 mg guaifenesin) is unlikely to provide significant relief. She is advised to maintain adequate hydration and avoid prolonged outdoor exposure. If outdoor activities are unavoidable, she should ensure proper head coverage to prevent rebound congestion. Expect symptoms to gradually resolve. Contact me through [employer] with any concerns.    2. Health maintenance.  A message will be sent to Dr. Randolph to schedule an appointment for a Pap smear.        Please note that this dictation was created using voice recognition software. I have made every reasonable attempt to correct obvious errors, but I expect that there are errors of grammar and possibly content that I did not discover before finalizing the note.      Attestation      Verbal consent was acquired by the patient to use [employer] ambient listening note generation during this visit Yes

## 2025-04-29 DIAGNOSIS — B00.1 RECURRENT COLD SORES: ICD-10-CM

## 2025-04-29 RX ORDER — ACYCLOVIR 400 MG/1
400 TABLET ORAL 3 TIMES DAILY
Qty: 30 TABLET | Refills: 3 | Status: SHIPPED | OUTPATIENT
Start: 2025-04-29 | End: 2025-05-09

## (undated) DEVICE — CATHETER IV NON-SAFETY 18 GA X 1 1/4 (50/BX 4BX/CA)

## (undated) DEVICE — PENCIL ELECTSURG 10FT HLSTR - WITH BLADE (50EA/CA)

## (undated) DEVICE — TRAY SPINAL ANESTHESIA NON-SAFETY (10/CA)

## (undated) DEVICE — TUBING CLEARLINK DUO-VENT - C-FLO (48EA/CA)

## (undated) DEVICE — BLANKET UNDERBODY FULL ACCES - (5/CA)

## (undated) DEVICE — PACK C-SECTION (2EA/CA)

## (undated) DEVICE — SLEEVE, SEQUENTIAL CALF REG

## (undated) DEVICE — SUTURE 2-0 CHROMIC GUT CT-1 27 (36PK/BX)"

## (undated) DEVICE — GLOVE BIOGEL SZ 8 SURGICAL PF LTX - (50PR/BX 4BX/CA)

## (undated) DEVICE — CANISTER SUCTION 3000ML MECHANICAL FILTER AUTO SHUTOFF MEDI-VAC NONSTERILE LF DISP  (40EA/CA)

## (undated) DEVICE — TAPE CLOTH MEDIPORE 6 INCH - (12RL/CA)

## (undated) DEVICE — SUTURE 0 VICRYL PLUS CT 36 (36PK/BX)"

## (undated) DEVICE — SUTURE 3-0 VICRYL PLUS CT-1 - 36 INCH (36/BX)

## (undated) DEVICE — PACK ROOM TURNOVER L&D (12/CA)

## (undated) DEVICE — SUTURE 1 CHROMIC CTX ETHICON - (36PK/BX)

## (undated) DEVICE — STAPLER SKIN DISP - (6/BX 10BX/CA) VISISTAT

## (undated) DEVICE — LACTATED RINGERS INJ 1000 ML - (14EA/CA 60CA/PF)

## (undated) DEVICE — SODIUM CHL IRRIGATION 0.9% 1000ML (12EA/CA)

## (undated) DEVICE — HEAD HOLDER JUNIOR/ADULT

## (undated) DEVICE — CHLORAPREP 26 ML APPLICATOR - ORANGE TINT(25/CA)

## (undated) DEVICE — KIT  I.V. START (100EA/CA)

## (undated) DEVICE — DRESSING INTERCEED ABSORBABLE ADHESION BARRIER TC7 (10EA/CA)

## (undated) DEVICE — SET EXTENSION WITH 2 PORTS (48EA/CA) ***PART #2C8610 IS A SUBSTITUTE*****

## (undated) DEVICE — WATER IRRIGATION STERILE 1000ML (12EA/CA)

## (undated) DEVICE — SUTURE 0 VICRYL PLUS CT-1 - 36 INCH (36/BX)